# Patient Record
Sex: FEMALE | Race: WHITE | NOT HISPANIC OR LATINO | Employment: OTHER
[De-identification: names, ages, dates, MRNs, and addresses within clinical notes are randomized per-mention and may not be internally consistent; named-entity substitution may affect disease eponyms.]

---

## 2017-01-03 ENCOUNTER — HOSPITAL (OUTPATIENT)
Dept: OTHER | Age: 82
End: 2017-01-03
Attending: INTERNAL MEDICINE

## 2017-02-12 ENCOUNTER — HOSPITAL ENCOUNTER (EMERGENCY)
Facility: HOSPITAL | Age: 82
Discharge: HOME | End: 2017-02-12
Attending: EMERGENCY MEDICINE
Payer: MEDICARE

## 2017-02-12 ENCOUNTER — APPOINTMENT (OUTPATIENT)
Dept: GENERAL RADIOLOGY | Facility: HOSPITAL | Age: 82
End: 2017-02-12
Attending: EMERGENCY MEDICINE
Payer: MEDICARE

## 2017-02-12 ENCOUNTER — APPOINTMENT (OUTPATIENT)
Dept: CT IMAGING | Facility: HOSPITAL | Age: 82
End: 2017-02-12
Attending: EMERGENCY MEDICINE
Payer: MEDICARE

## 2017-02-12 VITALS
DIASTOLIC BLOOD PRESSURE: 72 MMHG | WEIGHT: 145 LBS | HEART RATE: 80 BPM | TEMPERATURE: 98 F | BODY MASS INDEX: 24.16 KG/M2 | HEIGHT: 65 IN | SYSTOLIC BLOOD PRESSURE: 152 MMHG | OXYGEN SATURATION: 94 % | RESPIRATION RATE: 14 BRPM

## 2017-02-12 DIAGNOSIS — S09.90XA HEAD INJURY, INITIAL ENCOUNTER: ICD-10-CM

## 2017-02-12 DIAGNOSIS — S80.01XA CONTUSION OF RIGHT KNEE, INITIAL ENCOUNTER: ICD-10-CM

## 2017-02-12 DIAGNOSIS — S00.83XA FACIAL CONTUSION, INITIAL ENCOUNTER: ICD-10-CM

## 2017-02-12 DIAGNOSIS — S20.219A CHEST WALL CONTUSION, UNSPECIFIED LATERALITY, INITIAL ENCOUNTER: Primary | ICD-10-CM

## 2017-02-12 PROCEDURE — 73560 X-RAY EXAM OF KNEE 1 OR 2: CPT

## 2017-02-12 PROCEDURE — 71120 X-RAY EXAM BREASTBONE 2/>VWS: CPT

## 2017-02-12 PROCEDURE — 90471 IMMUNIZATION ADMIN: CPT

## 2017-02-12 PROCEDURE — 99284 EMERGENCY DEPT VISIT MOD MDM: CPT

## 2017-02-12 PROCEDURE — 71111 X-RAY EXAM RIBS/CHEST4/> VWS: CPT

## 2017-02-12 PROCEDURE — 70450 CT HEAD/BRAIN W/O DYE: CPT

## 2017-02-12 RX ORDER — HYDROCODONE BITARTRATE AND ACETAMINOPHEN 5; 325 MG/1; MG/1
1-2 TABLET ORAL EVERY 4 HOURS PRN
Qty: 30 TABLET | Refills: 0 | Status: SHIPPED | OUTPATIENT
Start: 2017-02-12 | End: 2017-02-19

## 2017-02-12 RX ORDER — HYDROCODONE BITARTRATE AND ACETAMINOPHEN 5; 325 MG/1; MG/1
2 TABLET ORAL ONCE
Status: COMPLETED | OUTPATIENT
Start: 2017-02-12 | End: 2017-02-12

## 2017-02-12 NOTE — ED PROVIDER NOTES
Patient Seen in: [facility] Emergency Department    History   Patient presents with:  Contusion (musculoskeletal)    Stated Complaint: fall rib pain    HPI    80year-old female stated that she lost her footing and fell in a large store 3 days ago h times daily.    Spacer/Aero-Holding Chambers (AEROCHAMBER MINI CHAMBER) Does not apply Device,  Use w proair       Family History   Problem Relation Age of Onset   • Other[other] [OTHER] Father      MS   • Heart Disorder Brother 78     Myocardial infarction ecchymosis or crepitations. Abdomen is soft and nontender without masses or rebound. Back is atraumatic. Extremities: Left upper and lower extremities are atraumatic.   Right upper extremity–there is a 1 cm C-shaped superficial laceration on the dorsal a

## 2017-06-05 ENCOUNTER — APPOINTMENT (OUTPATIENT)
Dept: GENERAL RADIOLOGY | Facility: HOSPITAL | Age: 82
End: 2017-06-05
Attending: EMERGENCY MEDICINE
Payer: MEDICARE

## 2017-06-05 ENCOUNTER — HOSPITAL ENCOUNTER (OUTPATIENT)
Facility: HOSPITAL | Age: 82
Setting detail: OBSERVATION
LOS: 1 days | Discharge: HOME | End: 2017-06-06
Attending: EMERGENCY MEDICINE | Admitting: INTERNAL MEDICINE
Payer: MEDICARE

## 2017-06-05 DIAGNOSIS — S32.592A FRACTURE OF MULTIPLE PUBIC RAMI, LEFT, CLOSED, INITIAL ENCOUNTER (HCC): Primary | ICD-10-CM

## 2017-06-05 PROBLEM — S32.599A FRACTURE OF MULTIPLE PUBIC RAMI (HCC): Status: ACTIVE | Noted: 2017-06-05

## 2017-06-05 PROCEDURE — 96374 THER/PROPH/DIAG INJ IV PUSH: CPT

## 2017-06-05 PROCEDURE — 73502 X-RAY EXAM HIP UNI 2-3 VIEWS: CPT | Performed by: EMERGENCY MEDICINE

## 2017-06-05 PROCEDURE — 96372 THER/PROPH/DIAG INJ SC/IM: CPT

## 2017-06-05 PROCEDURE — 96375 TX/PRO/DX INJ NEW DRUG ADDON: CPT

## 2017-06-05 PROCEDURE — 99285 EMERGENCY DEPT VISIT HI MDM: CPT

## 2017-06-05 RX ORDER — LOSARTAN POTASSIUM 50 MG/1
50 TABLET ORAL DAILY
COMMUNITY
End: 2019-09-17 | Stop reason: ALTCHOICE

## 2017-06-05 RX ORDER — METOPROLOL SUCCINATE 25 MG/1
25 TABLET, EXTENDED RELEASE ORAL
Status: DISCONTINUED | OUTPATIENT
Start: 2017-06-06 | End: 2017-06-06

## 2017-06-05 RX ORDER — ENOXAPARIN SODIUM 100 MG/ML
40 INJECTION SUBCUTANEOUS DAILY
Status: DISCONTINUED | OUTPATIENT
Start: 2017-06-05 | End: 2017-06-06

## 2017-06-05 RX ORDER — AMLODIPINE BESYLATE 5 MG/1
5 TABLET ORAL DAILY
Status: DISCONTINUED | OUTPATIENT
Start: 2017-06-05 | End: 2017-06-06

## 2017-06-05 RX ORDER — POLYETHYLENE GLYCOL 3350 17 G/17G
17 POWDER, FOR SOLUTION ORAL DAILY PRN
Status: DISCONTINUED | OUTPATIENT
Start: 2017-06-05 | End: 2017-06-06

## 2017-06-05 RX ORDER — IBUPROFEN 400 MG/1
200 TABLET ORAL EVERY 4 HOURS PRN
Status: DISCONTINUED | OUTPATIENT
Start: 2017-06-05 | End: 2017-06-06

## 2017-06-05 RX ORDER — PRAVASTATIN SODIUM 10 MG
10 TABLET ORAL NIGHTLY
Status: DISCONTINUED | OUTPATIENT
Start: 2017-06-05 | End: 2017-06-06

## 2017-06-05 RX ORDER — ACETAMINOPHEN 325 MG/1
650 TABLET ORAL EVERY 6 HOURS PRN
Status: DISCONTINUED | OUTPATIENT
Start: 2017-06-05 | End: 2017-06-06

## 2017-06-05 RX ORDER — IBUPROFEN 600 MG/1
600 TABLET ORAL EVERY 4 HOURS PRN
Status: DISCONTINUED | OUTPATIENT
Start: 2017-06-05 | End: 2017-06-06

## 2017-06-05 RX ORDER — DIPHENHYDRAMINE HYDROCHLORIDE 50 MG/ML
25 INJECTION INTRAMUSCULAR; INTRAVENOUS ONCE
Status: COMPLETED | OUTPATIENT
Start: 2017-06-05 | End: 2017-06-05

## 2017-06-05 RX ORDER — LOSARTAN POTASSIUM 50 MG/1
50 TABLET ORAL 2 TIMES DAILY
Status: DISCONTINUED | OUTPATIENT
Start: 2017-06-05 | End: 2017-06-06

## 2017-06-05 RX ORDER — PANTOPRAZOLE SODIUM 40 MG/1
40 TABLET, DELAYED RELEASE ORAL
Status: DISCONTINUED | OUTPATIENT
Start: 2017-06-06 | End: 2017-06-06

## 2017-06-05 RX ORDER — PRAVASTATIN SODIUM 10 MG
10 TABLET ORAL NIGHTLY
COMMUNITY
End: 2017-07-31

## 2017-06-05 RX ORDER — SODIUM PHOSPHATE, DIBASIC AND SODIUM PHOSPHATE, MONOBASIC 7; 19 G/133ML; G/133ML
1 ENEMA RECTAL ONCE AS NEEDED
Status: DISCONTINUED | OUTPATIENT
Start: 2017-06-05 | End: 2017-06-06

## 2017-06-05 RX ORDER — DOCUSATE SODIUM 100 MG/1
100 CAPSULE, LIQUID FILLED ORAL 2 TIMES DAILY
Status: DISCONTINUED | OUTPATIENT
Start: 2017-06-05 | End: 2017-06-06

## 2017-06-05 RX ORDER — BISACODYL 10 MG
10 SUPPOSITORY, RECTAL RECTAL
Status: DISCONTINUED | OUTPATIENT
Start: 2017-06-05 | End: 2017-06-06

## 2017-06-05 RX ORDER — AMLODIPINE BESYLATE 5 MG/1
5 TABLET ORAL DAILY
COMMUNITY
End: 2018-09-17 | Stop reason: ALTCHOICE

## 2017-06-05 RX ORDER — MORPHINE SULFATE 4 MG/ML
4 INJECTION, SOLUTION INTRAMUSCULAR; INTRAVENOUS EVERY 30 MIN PRN
Status: DISCONTINUED | OUTPATIENT
Start: 2017-06-05 | End: 2017-06-06

## 2017-06-05 RX ORDER — ONDANSETRON 2 MG/ML
4 INJECTION INTRAMUSCULAR; INTRAVENOUS ONCE
Status: COMPLETED | OUTPATIENT
Start: 2017-06-05 | End: 2017-06-05

## 2017-06-05 RX ORDER — TRAMADOL HYDROCHLORIDE 50 MG/1
50 TABLET ORAL EVERY 6 HOURS PRN
Status: DISCONTINUED | OUTPATIENT
Start: 2017-06-05 | End: 2017-06-06

## 2017-06-05 RX ORDER — IBUPROFEN 400 MG/1
400 TABLET ORAL EVERY 4 HOURS PRN
Status: DISCONTINUED | OUTPATIENT
Start: 2017-06-05 | End: 2017-06-06

## 2017-06-05 NOTE — ED PROVIDER NOTES
Patient Seen in: [facility] Emergency Department    History   Patient presents with:  Hip Pain    Stated Complaint: fall; L hip pain    HPI    Alejandro Sheehan is an 24-year-old female presenting to the emergency department for hip pain.   Patient states she 0.50  Years: 20      Smokeless Status: Never Used                        Alcohol Use: Yes           0.6 oz/week       1 Glasses of wine per week       Comment: rare      Review of Systems    Positive for stated complaint: fall; L hip pain  Other systems ar

## 2017-06-05 NOTE — H&P
[facility] Hospitalist History and Physical      Patient presents with:  Hip Pain       PCP: Nora Uribe MD      History of Present Illness: Patient is a 80year old female with PMH sig for afib, HTN presents sp fall with sig L groin/hip pain.       She was adenopathy, thyroid: no enlargment/tenderness/nodules appreciated   Lungs:   Clear to auscultation bilaterally. Normal effort   Chest wall:  No tenderness or deformity.    Heart:  Regular rate and rhythm, S1, S2 normal, no murmur, rub or gallop appreciated to span two midnight's based on the clinical documentation in H+P. Based on patients current state of illness, I anticipate that, after discharge, patient will require TBD.

## 2017-06-05 NOTE — CONSULTS
[facility]  Report of Consultation    Adán Almanzanithya Patient Status:  Inpatient    1930 MRN [id number]   [facility] 3SW-A Attending Sherry Jo MD   [facility] Day # 0 PCP Starla Bravo MD     Reason for Consultation: MG/0.4ML injection 40 mg, 40 mg, Subcutaneous, Daily  •  acetaminophen (TYLENOL) tab 650 mg, 650 mg, Oral, Q6H PRN  •  ibuprofen (MOTRIN) tab 200 mg, 200 mg, Oral, Q4H PRN **OR** ibuprofen (MOTRIN) tab 400 mg, 400 mg, Oral, Q4H PRN **OR** ibuprofen (MOTRIN Study Result      PROCEDURE:  XR HIP W OR WO PELVIS 2 OR 3 VIEWS, LEFT (CPT=73502)      TECHNIQUE:  Unilateral 2 to 3 views of the hip and pelvis if performed.      COMPARISON:  None.      INDICATIONS:  fall; L hip pain      PATIENT STATED HISTORY: (A

## 2017-06-05 NOTE — PROGRESS NOTES
Patient received from ER. Alert and oriented. Very hard of hearing, and daughter is taking her hearing aides home, as she does NOT want them here in hospital in fear of losing them. Patient denies need for pain meds at present.  Patient repositioned for co

## 2017-06-06 VITALS
OXYGEN SATURATION: 90 % | DIASTOLIC BLOOD PRESSURE: 49 MMHG | HEART RATE: 77 BPM | TEMPERATURE: 98 F | RESPIRATION RATE: 18 BRPM | BODY MASS INDEX: 24.11 KG/M2 | SYSTOLIC BLOOD PRESSURE: 117 MMHG | HEIGHT: 66 IN | WEIGHT: 150 LBS

## 2017-06-06 PROCEDURE — 85025 COMPLETE CBC W/AUTO DIFF WBC: CPT | Performed by: INTERNAL MEDICINE

## 2017-06-06 PROCEDURE — 80048 BASIC METABOLIC PNL TOTAL CA: CPT | Performed by: INTERNAL MEDICINE

## 2017-06-06 PROCEDURE — 97116 GAIT TRAINING THERAPY: CPT

## 2017-06-06 PROCEDURE — 97161 PT EVAL LOW COMPLEX 20 MIN: CPT

## 2017-06-06 RX ORDER — IBUPROFEN 400 MG/1
400 TABLET ORAL EVERY 4 HOURS PRN
Qty: 30 TABLET | Refills: 0 | Status: SHIPPED | OUTPATIENT
Start: 2017-06-06 | End: 2018-05-07

## 2017-06-06 NOTE — CM/SW NOTE
[facility] accepted transport for 5PM to [facility] [phone]. Pt updated re: d/c today and agrees with plan. She feels that no one wants her. SW offered support and discussed that she does need to stay in the hospital but does need rehab.

## 2017-06-06 NOTE — PLAN OF CARE
Impaired Activities of Daily Living    • Achieve highest/safest level of independence in self care Adequate for Discharge        Impaired Functional Mobility    • Achieve highest/safest level of mobility/gait Adequate for Discharge        Impaired Function

## 2017-06-06 NOTE — PROGRESS NOTES
[facility] Hospitalist Progress Note                                                                   [address] Cortes  4/27/1930    SUBJECTIVE:  Up with PT. Got to chair. Taking advil. fracture   Ortho has seen-non operable. WBAT. Pain controlled w advil.       HTN  - cont losartan/metoprolol/statin        Viviana Lopez  [facility] Hospitalist  Pager: [phone]

## 2017-06-06 NOTE — CM/SW NOTE
Patient failed inpatient criteria. Second level of review completed and supports observation. UR committee in agreement. Discussed with Dr. Vipul Hernandez who approves observation status. DENG/Observation letter given to the patient and order written.

## 2017-06-06 NOTE — CM/SW NOTE
06/06/17 1400   CM/SW Referral Data   Referral Source Nurse;Family   Reason for Referral Discharge planning   Informant Children  (Dtrs)   Pertinent Medical Hx   Primary Care Physician Name Allen Ritchie MD   Patient Info   Patient's Mental Status Alert;O [phone]. Informed by Unknown Champagne that cost would be 397/semi-private daily and 309/day for private suite. PT/OT can be billed to Medicare Part B. Informed dtr, Jimmy Noguera, of above. SW also noted that medically, pt can d/c to facility today.   Dtr indicating

## 2017-06-06 NOTE — PHYSICAL THERAPY NOTE
PHYSICAL THERAPY EVALUATION - INPATIENT     Room Number: 360/360-A  Evaluation Date: 6/6/2017  Type of Evaluation: Initial  Physician Order: PT Eval and Treat    Presenting Problem: L pubic rami fx s/p fall  Reason for Therapy: Mobility Dysfunction and home.    OBJECTIVE  Precautions: Hard of hearing  Fall Risk: High fall risk    WEIGHT BEARING RESTRICTION  Weight Bearing Restriction: L lower extremity           L Lower Extremity: Weight Bearing as Tolerated    PAIN ASSESSMENT  Ratin  Location: L guerline CL    FUNCTIONAL ABILITY STATUS  Gait Assessment   Gait Assistance: Dependent assistance (actual min assist)  Distance (ft): 1  Assistive Device: Rolling walker  Pattern: L Decreased stance time  Stoop/Curb Assistance: Not tested       Skilled Therapy Prov that patients with this level of impairment may benefit from continued physical therapy in subacute setting. The patient scores 1/20 on the Elderly Mobility Scale, indicating patient is dependent in terms of safe mobility.   Pt currently requires 2 person a

## 2017-06-07 NOTE — CM/SW NOTE
06/07/17 0700   Discharge disposition   Discharged to: Skilled Nurs   Name of [id number] [address]   Discharge transportation [facility] 6/6/17

## 2017-07-12 PROCEDURE — 83970 ASSAY OF PARATHORMONE: CPT | Performed by: PHYSICIAN ASSISTANT

## 2017-08-03 ENCOUNTER — APPOINTMENT (OUTPATIENT)
Dept: CT IMAGING | Facility: HOSPITAL | Age: 82
End: 2017-08-03
Attending: EMERGENCY MEDICINE
Payer: MEDICARE

## 2017-08-03 ENCOUNTER — APPOINTMENT (OUTPATIENT)
Dept: GENERAL RADIOLOGY | Facility: HOSPITAL | Age: 82
End: 2017-08-03
Attending: EMERGENCY MEDICINE
Payer: MEDICARE

## 2017-08-03 ENCOUNTER — HOSPITAL ENCOUNTER (EMERGENCY)
Facility: HOSPITAL | Age: 82
Discharge: HOME | End: 2017-08-03
Attending: EMERGENCY MEDICINE
Payer: MEDICARE

## 2017-08-03 VITALS
OXYGEN SATURATION: 98 % | SYSTOLIC BLOOD PRESSURE: 122 MMHG | RESPIRATION RATE: 18 BRPM | WEIGHT: 151.88 LBS | TEMPERATURE: 97 F | HEIGHT: 63.5 IN | DIASTOLIC BLOOD PRESSURE: 53 MMHG | BODY MASS INDEX: 26.58 KG/M2 | HEART RATE: 78 BPM

## 2017-08-03 DIAGNOSIS — R19.7 DIARRHEA, UNSPECIFIED TYPE: Primary | ICD-10-CM

## 2017-08-03 DIAGNOSIS — K57.92 ACUTE DIVERTICULITIS: ICD-10-CM

## 2017-08-03 LAB
ALBUMIN SERPL-MCNC: 3.4 G/DL (ref 3.5–4.8)
ALP LIVER SERPL-CCNC: 153 U/L (ref 55–142)
ALT SERPL-CCNC: 15 U/L (ref 14–54)
AST SERPL-CCNC: 18 U/L (ref 15–41)
BASOPHILS # BLD AUTO: 0.11 X10(3) UL (ref 0–0.1)
BASOPHILS NFR BLD AUTO: 1 %
BILIRUB SERPL-MCNC: 0.5 MG/DL (ref 0.1–2)
BILIRUB UR QL STRIP.AUTO: NEGATIVE
BUN BLD-MCNC: 13 MG/DL (ref 8–20)
CALCIUM BLD-MCNC: 8.8 MG/DL (ref 8.3–10.3)
CHLORIDE: 107 MMOL/L (ref 101–111)
CLARITY UR REFRACT.AUTO: CLEAR
CO2: 28 MMOL/L (ref 22–32)
COLOR UR AUTO: YELLOW
CREAT BLD-MCNC: 0.78 MG/DL (ref 0.55–1.02)
EOSINOPHIL # BLD AUTO: 0.29 X10(3) UL (ref 0–0.3)
EOSINOPHIL NFR BLD AUTO: 2.7 %
ERYTHROCYTE [DISTWIDTH] IN BLOOD BY AUTOMATED COUNT: 15.3 % (ref 11.5–16)
GLUCOSE BLD-MCNC: 91 MG/DL (ref 70–99)
GLUCOSE UR STRIP.AUTO-MCNC: NEGATIVE MG/DL
HCT VFR BLD AUTO: 39.4 % (ref 34–50)
HGB BLD-MCNC: 12.5 G/DL (ref 12–16)
IMMATURE GRANULOCYTE COUNT: 0.04 X10(3) UL (ref 0–1)
IMMATURE GRANULOCYTE RATIO %: 0.4 %
KETONES UR STRIP.AUTO-MCNC: NEGATIVE MG/DL
LYMPHOCYTES # BLD AUTO: 1.93 X10(3) UL (ref 0.9–4)
LYMPHOCYTES NFR BLD AUTO: 18.3 %
M PROTEIN MFR SERPL ELPH: 7.8 G/DL (ref 6.1–8.3)
MCH RBC QN AUTO: 26.2 PG (ref 27–33.2)
MCHC RBC AUTO-ENTMCNC: 31.7 G/DL (ref 31–37)
MCV RBC AUTO: 82.6 FL (ref 81–100)
MONOCYTES # BLD AUTO: 0.57 X10(3) UL (ref 0.1–0.6)
MONOCYTES NFR BLD AUTO: 5.4 %
NEUTROPHIL ABS PRELIM: 7.61 X10 (3) UL (ref 1.3–6.7)
NEUTROPHILS # BLD AUTO: 7.61 X10(3) UL (ref 1.3–6.7)
NEUTROPHILS NFR BLD AUTO: 72.2 %
NITRITE UR QL STRIP.AUTO: POSITIVE
PH UR STRIP.AUTO: 5 [PH] (ref 4.5–8)
PLATELET # BLD AUTO: 397 10(3)UL (ref 150–450)
POTASSIUM SERPL-SCNC: 3 MMOL/L (ref 3.6–5.1)
PROT UR STRIP.AUTO-MCNC: NEGATIVE MG/DL
RBC # BLD AUTO: 4.77 X10(6)UL (ref 3.8–5.1)
RED CELL DISTRIBUTION WIDTH-SD: 46.3 FL (ref 35.1–46.3)
ROTAVIRUS AG EIA: NEGATIVE
SODIUM SERPL-SCNC: 142 MMOL/L (ref 136–144)
SP GR UR STRIP.AUTO: 1.03 (ref 1–1.03)
UROBILINOGEN UR STRIP.AUTO-MCNC: <2 MG/DL
WBC # BLD AUTO: 10.6 X10(3) UL (ref 4–13)

## 2017-08-03 PROCEDURE — 87425 ROTAVIRUS AG IA: CPT | Performed by: EMERGENCY MEDICINE

## 2017-08-03 PROCEDURE — 85025 COMPLETE CBC W/AUTO DIFF WBC: CPT | Performed by: EMERGENCY MEDICINE

## 2017-08-03 PROCEDURE — 99284 EMERGENCY DEPT VISIT MOD MDM: CPT

## 2017-08-03 PROCEDURE — 87086 URINE CULTURE/COLONY COUNT: CPT | Performed by: EMERGENCY MEDICINE

## 2017-08-03 PROCEDURE — 87427 SHIGA-LIKE TOXIN AG IA: CPT | Performed by: EMERGENCY MEDICINE

## 2017-08-03 PROCEDURE — 82272 OCCULT BLD FECES 1-3 TESTS: CPT | Performed by: EMERGENCY MEDICINE

## 2017-08-03 PROCEDURE — 80053 COMPREHEN METABOLIC PANEL: CPT | Performed by: EMERGENCY MEDICINE

## 2017-08-03 PROCEDURE — 87186 SC STD MICRODIL/AGAR DIL: CPT | Performed by: EMERGENCY MEDICINE

## 2017-08-03 PROCEDURE — 71010 XR CHEST AP PORTABLE  (CPT=71010): CPT | Performed by: EMERGENCY MEDICINE

## 2017-08-03 PROCEDURE — 87045 FECES CULTURE AEROBIC BACT: CPT | Performed by: EMERGENCY MEDICINE

## 2017-08-03 PROCEDURE — 81001 URINALYSIS AUTO W/SCOPE: CPT | Performed by: EMERGENCY MEDICINE

## 2017-08-03 PROCEDURE — 96374 THER/PROPH/DIAG INJ IV PUSH: CPT

## 2017-08-03 PROCEDURE — 87077 CULTURE AEROBIC IDENTIFY: CPT | Performed by: EMERGENCY MEDICINE

## 2017-08-03 PROCEDURE — 96361 HYDRATE IV INFUSION ADD-ON: CPT

## 2017-08-03 PROCEDURE — 87046 STOOL CULTR AEROBIC BACT EA: CPT | Performed by: EMERGENCY MEDICINE

## 2017-08-03 PROCEDURE — 87493 C DIFF AMPLIFIED PROBE: CPT | Performed by: EMERGENCY MEDICINE

## 2017-08-03 PROCEDURE — 74177 CT ABD & PELVIS W/CONTRAST: CPT | Performed by: EMERGENCY MEDICINE

## 2017-08-03 RX ORDER — ONDANSETRON 2 MG/ML
4 INJECTION INTRAMUSCULAR; INTRAVENOUS ONCE
Status: COMPLETED | OUTPATIENT
Start: 2017-08-03 | End: 2017-08-03

## 2017-08-03 RX ORDER — DIPHENOXYLATE HYDROCHLORIDE AND ATROPINE SULFATE 2.5; .025 MG/1; MG/1
1-2 TABLET ORAL 4 TIMES DAILY PRN
Qty: 30 TABLET | Refills: 0 | Status: SHIPPED | OUTPATIENT
Start: 2017-08-03 | End: 2017-09-02

## 2017-08-03 RX ORDER — METRONIDAZOLE 500 MG/1
500 TABLET ORAL 3 TIMES DAILY
Qty: 30 TABLET | Refills: 0 | Status: SHIPPED | OUTPATIENT
Start: 2017-08-03 | End: 2017-08-13

## 2017-08-03 RX ORDER — CLINDAMYCIN HYDROCHLORIDE 300 MG/1
300 CAPSULE ORAL 3 TIMES DAILY
Qty: 30 CAPSULE | Refills: 0 | Status: SHIPPED | OUTPATIENT
Start: 2017-08-03 | End: 2017-08-13

## 2017-08-03 NOTE — ED INITIAL ASSESSMENT (HPI)
Dizziness, diarrhea, weakness x5 days. Diarrhea 3-4x per day. Afebrile. Hospitalized in June with broken pelvis after a fall.

## 2017-08-03 NOTE — ED PROVIDER NOTES
Patient Seen in: [facility] Emergency Department    History   Patient presents with:  Nausea/Vomiting/Diarrhea (gastrointestinal)    Stated Complaint: pt c/o diahrrea for 5 days     HPI    70-year-old female presents emergency department complaining by mouth every 4 (four) hours as needed. AmLODIPine Besylate 5 MG Oral Tab,  Take 5 mg by mouth daily. Losartan Potassium 50 MG Oral Tab,  Take 50 mg by mouth 2 (two) times daily.    Metoprolol Succinate ER 25 MG Oral Tablet 24 Hr,  Take 25 mg by mouth meningismus no carotid bruit  CV: Regular rate and rhythm no murmur rub  Respiratory: Clear to auscultation bilaterally no crackles no wheezes no accessory muscle use  Abdomen: Diffuse abdominal tenderness, no rebound no guarding  no hepatosplenomegaly bow CULTURE W/SHIGATOXIN    Narrative: The following orders were created for panel order STOOL CULTURE W/SHIGATOXIN.   Procedure                               Abnormality         Status                     ---------                               ----------- pt c/o diarrhea for 5 days  TECHNIQUE:  CT scanning was performed from the dome of the diaphragm to the pubic symphysis with non-ionic intravenous contrast material. Post contrast coronal MPR imaging was performed. Dose reduction techniques were used.  Yassine Pierce ORGANS:  Status post hysterectomy. BONES:  Old bilateral rib fractures noted. Healing fractures of the left inferior and superior pubic rami with some callus and periosteal reaction. Status post right total hip pinning are typical artifacts.  Moderate scoli Patient was also instructed to followup with the appropriate physician. Patient verbalizes and agrees with plan. Patient discharged in good condition.         Disposition and Plan     Clinical Impression:  Diarrhea, unspecified type  (primary encounter di

## 2017-08-09 PROBLEM — G89.29 CHRONIC BILATERAL LOW BACK PAIN: Status: ACTIVE | Noted: 2017-08-09

## 2017-08-09 PROBLEM — M54.50 CHRONIC BILATERAL LOW BACK PAIN: Status: ACTIVE | Noted: 2017-08-09

## 2017-09-01 ENCOUNTER — HOSPITAL (OUTPATIENT)
Dept: OTHER | Age: 82
End: 2017-09-01
Attending: INTERNAL MEDICINE

## 2018-03-29 ENCOUNTER — APPOINTMENT (OUTPATIENT)
Dept: GENERAL RADIOLOGY | Facility: HOSPITAL | Age: 83
DRG: 192 | End: 2018-03-29
Attending: EMERGENCY MEDICINE
Payer: MEDICARE

## 2018-03-29 ENCOUNTER — HOSPITAL ENCOUNTER (INPATIENT)
Facility: HOSPITAL | Age: 83
LOS: 1 days | Discharge: HOME | DRG: 192 | End: 2018-03-30
Attending: EMERGENCY MEDICINE | Admitting: INTERNAL MEDICINE
Payer: MEDICARE

## 2018-03-29 ENCOUNTER — APPOINTMENT (OUTPATIENT)
Dept: CT IMAGING | Facility: HOSPITAL | Age: 83
DRG: 192 | End: 2018-03-29
Attending: EMERGENCY MEDICINE
Payer: MEDICARE

## 2018-03-29 DIAGNOSIS — E87.6 HYPOKALEMIA: Primary | ICD-10-CM

## 2018-03-29 DIAGNOSIS — R09.02 HYPOXIA: ICD-10-CM

## 2018-03-29 DIAGNOSIS — J20.9 ACUTE BRONCHITIS, UNSPECIFIED ORGANISM: ICD-10-CM

## 2018-03-29 DIAGNOSIS — R07.9 ACUTE CHEST PAIN: ICD-10-CM

## 2018-03-29 PROBLEM — R73.9 HYPERGLYCEMIA: Status: ACTIVE | Noted: 2018-03-29

## 2018-03-29 PROBLEM — D72.829 LEUKOCYTOSIS: Status: ACTIVE | Noted: 2018-03-29

## 2018-03-29 PROCEDURE — 99285 EMERGENCY DEPT VISIT HI MDM: CPT

## 2018-03-29 PROCEDURE — 96366 THER/PROPH/DIAG IV INF ADDON: CPT

## 2018-03-29 PROCEDURE — 93010 ELECTROCARDIOGRAM REPORT: CPT

## 2018-03-29 PROCEDURE — 36415 COLL VENOUS BLD VENIPUNCTURE: CPT

## 2018-03-29 PROCEDURE — 87999 UNLISTED MICROBIOLOGY PX: CPT

## 2018-03-29 PROCEDURE — 84484 ASSAY OF TROPONIN QUANT: CPT

## 2018-03-29 PROCEDURE — 96365 THER/PROPH/DIAG IV INF INIT: CPT

## 2018-03-29 PROCEDURE — 87502 INFLUENZA DNA AMP PROBE: CPT | Performed by: EMERGENCY MEDICINE

## 2018-03-29 PROCEDURE — 85025 COMPLETE CBC W/AUTO DIFF WBC: CPT | Performed by: EMERGENCY MEDICINE

## 2018-03-29 PROCEDURE — 93005 ELECTROCARDIOGRAM TRACING: CPT

## 2018-03-29 PROCEDURE — 87040 BLOOD CULTURE FOR BACTERIA: CPT | Performed by: EMERGENCY MEDICINE

## 2018-03-29 PROCEDURE — 80053 COMPREHEN METABOLIC PANEL: CPT | Performed by: EMERGENCY MEDICINE

## 2018-03-29 PROCEDURE — 71275 CT ANGIOGRAPHY CHEST: CPT | Performed by: EMERGENCY MEDICINE

## 2018-03-29 PROCEDURE — 87798 DETECT AGENT NOS DNA AMP: CPT | Performed by: EMERGENCY MEDICINE

## 2018-03-29 PROCEDURE — 94640 AIRWAY INHALATION TREATMENT: CPT

## 2018-03-29 PROCEDURE — 71046 X-RAY EXAM CHEST 2 VIEWS: CPT | Performed by: EMERGENCY MEDICINE

## 2018-03-29 PROCEDURE — 85378 FIBRIN DEGRADE SEMIQUANT: CPT | Performed by: EMERGENCY MEDICINE

## 2018-03-29 PROCEDURE — 84484 ASSAY OF TROPONIN QUANT: CPT | Performed by: EMERGENCY MEDICINE

## 2018-03-29 PROCEDURE — 83880 ASSAY OF NATRIURETIC PEPTIDE: CPT | Performed by: EMERGENCY MEDICINE

## 2018-03-29 RX ORDER — ONDANSETRON 2 MG/ML
4 INJECTION INTRAMUSCULAR; INTRAVENOUS EVERY 6 HOURS PRN
Status: DISCONTINUED | OUTPATIENT
Start: 2018-03-29 | End: 2018-03-29

## 2018-03-29 RX ORDER — LOSARTAN POTASSIUM 50 MG/1
50 TABLET ORAL 2 TIMES DAILY
Status: DISCONTINUED | OUTPATIENT
Start: 2018-03-29 | End: 2018-03-30

## 2018-03-29 RX ORDER — HYDROCODONE BITARTRATE AND ACETAMINOPHEN 5; 325 MG/1; MG/1
2 TABLET ORAL EVERY 4 HOURS PRN
Status: DISCONTINUED | OUTPATIENT
Start: 2018-03-29 | End: 2018-03-30

## 2018-03-29 RX ORDER — AMLODIPINE BESYLATE 5 MG/1
5 TABLET ORAL DAILY
Status: DISCONTINUED | OUTPATIENT
Start: 2018-03-29 | End: 2018-03-30

## 2018-03-29 RX ORDER — ALBUTEROL SULFATE 2.5 MG/3ML
2.5 SOLUTION RESPIRATORY (INHALATION) EVERY 6 HOURS PRN
Status: DISCONTINUED | OUTPATIENT
Start: 2018-03-29 | End: 2018-03-30

## 2018-03-29 RX ORDER — PANTOPRAZOLE SODIUM 40 MG/1
40 TABLET, DELAYED RELEASE ORAL
Status: DISCONTINUED | OUTPATIENT
Start: 2018-03-30 | End: 2018-03-30

## 2018-03-29 RX ORDER — ACETAMINOPHEN 325 MG/1
650 TABLET ORAL EVERY 4 HOURS PRN
Status: DISCONTINUED | OUTPATIENT
Start: 2018-03-29 | End: 2018-03-30

## 2018-03-29 RX ORDER — PRAVASTATIN SODIUM 10 MG
10 TABLET ORAL NIGHTLY
Status: DISCONTINUED | OUTPATIENT
Start: 2018-03-29 | End: 2018-03-30

## 2018-03-29 RX ORDER — IBUPROFEN 600 MG/1
600 TABLET ORAL EVERY 6 HOURS PRN
Status: DISCONTINUED | OUTPATIENT
Start: 2018-03-29 | End: 2018-03-30

## 2018-03-29 RX ORDER — HYDROCODONE BITARTRATE AND ACETAMINOPHEN 5; 325 MG/1; MG/1
1 TABLET ORAL EVERY 4 HOURS PRN
Status: DISCONTINUED | OUTPATIENT
Start: 2018-03-29 | End: 2018-03-30

## 2018-03-29 RX ORDER — POTASSIUM CHLORIDE 14.9 MG/ML
20 INJECTION INTRAVENOUS ONCE
Status: COMPLETED | OUTPATIENT
Start: 2018-03-29 | End: 2018-03-29

## 2018-03-29 RX ORDER — METOPROLOL SUCCINATE 25 MG/1
25 TABLET, EXTENDED RELEASE ORAL
Status: DISCONTINUED | OUTPATIENT
Start: 2018-03-29 | End: 2018-03-30

## 2018-03-29 RX ORDER — IBUPROFEN 600 MG/1
600 TABLET ORAL ONCE
Status: COMPLETED | OUTPATIENT
Start: 2018-03-29 | End: 2018-03-29

## 2018-03-29 RX ORDER — IPRATROPIUM BROMIDE AND ALBUTEROL SULFATE 2.5; .5 MG/3ML; MG/3ML
3 SOLUTION RESPIRATORY (INHALATION) CONTINUOUS
Status: DISCONTINUED | OUTPATIENT
Start: 2018-03-29 | End: 2018-03-30

## 2018-03-29 RX ORDER — POTASSIUM CHLORIDE 20 MEQ/1
40 TABLET, EXTENDED RELEASE ORAL ONCE
Status: COMPLETED | OUTPATIENT
Start: 2018-03-29 | End: 2018-03-29

## 2018-03-29 NOTE — ED PROVIDER NOTES
Patient Seen in: [facility] Emergency Department    History   Patient presents with:  Dyspnea CANDIS SOB (respiratory)    Stated Complaint: CANDIS    HPI    80-year-old female complaining of shortness of breath.   The patient states she has had some cold s Exam   ED Triage Vitals  BP: 111/56 [03/29/18 0730]  Pulse: 84 [03/29/18 0618]  Resp: 20 [03/29/18 0618]  Temp: (!) 97.5 °F (36.4 °C) [03/29/18 1310]  Temp src: Oral [03/29/18 1310]  SpO2: 90 % [03/29/18 0730]  O2 Device: Nasal cannula [03/29/18 0641]    C Basophil Absolute 0.11 (*)     All other components within normal limits   CBC W/ DIFFERENTIAL - Abnormal; Notable for the following:     WBC 13.2 (*)     HGB 11.2 (*)     MCH 26.0 (*)     Neutrophil Absolute Prelim 10.39 (*)     Neutrophil Absolute 10.39 hemoglobin 11.2 white blood 13.2  Xr Chest Pa + Lat Chest (cpt=71046)    Result Date: 3/29/2018  CONCLUSION:  No lobar consolidation to suggest pneumonia. COPD changes are again seen.      Dictated by: Misti Wall MD on 3/29/2018 at 7:32     Approved (20 mEq total) by mouth daily.   Qty: 30 tablet Refills: 0          Present on Admission  Date Reviewed: 1/26/2015          ICD-10-CM Noted POA    * (Principal)Hypokalemia E87.6 3/29/2018 Unknown    Acute bronchitis, unspecified organism J20.9 3/29/2018

## 2018-03-29 NOTE — CM/SW NOTE
Reviewed notes from paramedics. 89% on room air documented.  Patient normally only uses oxygen at night @ 3 LPM.

## 2018-03-29 NOTE — ED INITIAL ASSESSMENT (HPI)
Pt to er with [facility] for c.c. isabel with cough pt seen at urgent care yesterday for same. Pt given resp tx prior to arrival by medics pt states some relief .

## 2018-03-29 NOTE — ED NOTES
Report given to [facility]. Lunch tray ordered, will be delivered to pt's inpatient room.

## 2018-03-29 NOTE — ED NOTES
Pt ambulated around nursing station with 3L NC, walker and RN standby assistance. Tolerated well, o2 sat 93-95% on 3L while walking.

## 2018-03-29 NOTE — PLAN OF CARE
Patient received from the ED. Patient is a DNR. Patient presented with SOB, from home lives with her daughter. Patient is alert and oriented. On 3L NC, baseline for patient. Patient uses oxygen when not ambulating only per daughter and patient.  Ambulates o

## 2018-03-30 ENCOUNTER — APPOINTMENT (OUTPATIENT)
Dept: CV DIAGNOSTICS | Facility: HOSPITAL | Age: 83
DRG: 192 | End: 2018-03-30
Attending: INTERNAL MEDICINE
Payer: MEDICARE

## 2018-03-30 VITALS
DIASTOLIC BLOOD PRESSURE: 51 MMHG | RESPIRATION RATE: 18 BRPM | SYSTOLIC BLOOD PRESSURE: 133 MMHG | TEMPERATURE: 99 F | HEART RATE: 88 BPM | OXYGEN SATURATION: 91 %

## 2018-03-30 PROCEDURE — 84132 ASSAY OF SERUM POTASSIUM: CPT | Performed by: INTERNAL MEDICINE

## 2018-03-30 PROCEDURE — 94640 AIRWAY INHALATION TREATMENT: CPT

## 2018-03-30 PROCEDURE — 85025 COMPLETE CBC W/AUTO DIFF WBC: CPT | Performed by: INTERNAL MEDICINE

## 2018-03-30 PROCEDURE — 83735 ASSAY OF MAGNESIUM: CPT | Performed by: INTERNAL MEDICINE

## 2018-03-30 PROCEDURE — 80048 BASIC METABOLIC PNL TOTAL CA: CPT | Performed by: INTERNAL MEDICINE

## 2018-03-30 RX ORDER — MAGNESIUM OXIDE 400 MG (241.3 MG MAGNESIUM) TABLET
400 TABLET DAILY
Qty: 30 TABLET | Refills: 0 | Status: SHIPPED | OUTPATIENT
Start: 2018-03-30 | End: 2018-04-25

## 2018-03-30 RX ORDER — ASPIRIN 81 MG/1
81 TABLET ORAL DAILY
Status: DISCONTINUED | OUTPATIENT
Start: 2018-03-30 | End: 2018-03-30

## 2018-03-30 RX ORDER — POTASSIUM CHLORIDE 20 MEQ/1
20 TABLET, EXTENDED RELEASE ORAL DAILY
Qty: 30 TABLET | Refills: 0 | Status: SHIPPED | OUTPATIENT
Start: 2018-03-30 | End: 2018-03-30

## 2018-03-30 RX ORDER — MAGNESIUM OXIDE 400 MG (241.3 MG MAGNESIUM) TABLET
400 TABLET ONCE
Status: DISCONTINUED | OUTPATIENT
Start: 2018-03-30 | End: 2018-03-30

## 2018-03-30 RX ORDER — POTASSIUM CHLORIDE 20 MEQ/1
40 TABLET, EXTENDED RELEASE ORAL EVERY 4 HOURS
Status: COMPLETED | OUTPATIENT
Start: 2018-03-30 | End: 2018-03-30

## 2018-03-30 RX ORDER — ALBUTEROL SULFATE 2.5 MG/3ML
2.5 SOLUTION RESPIRATORY (INHALATION)
Status: DISCONTINUED | OUTPATIENT
Start: 2018-03-30 | End: 2018-03-30

## 2018-03-30 NOTE — PROGRESS NOTES
03/30/18 1351   Clinical Encounter Type   Visited With Patient   Routine Visit (Responded to the consult)   Patient's Supportive Strategies/Resources Identified patient's family support that include her daughters, their families and grandchildren   Tej

## 2018-03-30 NOTE — PLAN OF CARE
Pt. Is alert and oriented times four. Lungs with wheezes throughout. Pt. Is in sinus rhythm on monitor. She is anxious to go home. Both potassium and magnesium being replaced. O2 at 3 liters nasal cannula.

## 2018-03-30 NOTE — CONSULTS
[facility] Cardiology Consultation    Abbey Cortes Patient Status:  Inpatient    1930 MRN [id number]   [facility] 8NE-A Attending Ekta Abbott MD   [address] Day # 1 PCP Maia Lees MD     Reason for Consultation:  Chest pain, S Metoprolol Succinate ER  25 mg Oral Daily Beta Blocker   • Pantoprazole Sodium  40 mg Oral QAM AC   • Pravastatin Sodium  10 mg Oral Nightly       Continuous Infusions:  • ipratropium-albuterol         Social History:   reports that she has quit smoking.  [place] tobacco - acute exacerbation, likely bronchitis. 4. HTN  5. Hypokalemia/hypomagnesemia. - not on diuretics. Plan:  Echo and lexiscan cardiolite. Replace potassium. IV magnesium replacement. Potassium 10 meq on d/c, with BMP in one week.   increase

## 2018-03-30 NOTE — PLAN OF CARE
NURSING DISCHARGE NOTE    Discharged Home via Wheelchair. Accompanied by Support staff  Belongings Taken by patient/family. Pt. Given discharge instructions and follow up. She verbalizes understanding of new meds.

## 2018-03-30 NOTE — PLAN OF CARE
RESPIRATORY - ADULT    • Achieves optimal ventilation and oxygenation Progressing          Hx: falls, Afib, HTN, GERD, RT limb precautions     Patient came in with \"head cold\" symptoms and SOB, cxr- COPD, trops (-), CT (-) PE, blood cultures- pending, fl

## 2018-03-30 NOTE — H&P
[facility] Patient Status:  Inpatient    1930 MRN [id number]   [facility] 8NE-A Attending Kiki Bradford MD   [facility] Day # 1 PCP Jared Acuna MD     History of Present Illness:   Moe Khoury hours as needed for Wheezing.  Disp: 50 ampule Rfl: 0 3/29/2018 at Unknown time   PRAVASTATIN SODIUM 10 MG Oral Tab TAKE 1 TABLET(10 MG) BY MOUTH EVERY NIGHT Disp: 90 tablet Rfl: 1 3/28/2018 at 0900   Alendronate Sodium 70 MG Oral Tab TAKE ONE TABLET WEEKLY tablet 2 tablet Oral BID   ibuprofen (MOTRIN) tab 600 mg 600 mg Oral Q6H PRN   Losartan Potassium (COZAAR) tab 50 mg 50 mg Oral BID   Metoprolol Succinate ER (Toprol XL) 24 hr tab 25 mg 25 mg Oral Daily Beta Blocker   Pantoprazole Sodium (PROTONIX) EC tab nodular pleural thickening involving the right lung apex, anterior lateral aspect of the right upper lobe inferiorly and left lung base as described above. These are new since the previous exam.  Short-term followup imaging is recommended.   4. Cholelithia

## 2018-03-30 NOTE — DISCHARGE SUMMARY
General Medicine Discharge Summary     Patient ID:  Geodeclan Cortes  80year old  4/27/1930    Admit date: 3/29/2018    Discharge date and time: 3/30/18    Attending Physician: Nohemy He MD OP    Stable for d.c      Consults: IP CONSULT TO FAMILY/INTERNAL MED  IP CONSULT TO SPIRITUAL CARE  IP CONSULT TO SOCIAL WORK  IP CONSULT TO CARDIOLOGY    Operative Procedures:        Patient instructions:      Current Discharge Medication List    START t coarse, mild wheeze (unchanged from baseline per pt)  Abdomen: nontender, nondistended, intact BS  Extremities: no pedal edema   Neuro: CN inact, no focal deficits      Total time coordinating care for discharge: Greater than 30 minutes    Robles Valverde MD

## 2018-03-30 NOTE — CONSULTS
[facility]    Report of Consultation    Laykelly Cortes Patient Status:  Inpatient    1930 MRN [id number]   [facility] 8NE-A Attending Reuben Bledsoe MD   [address] Day # 1 PCP Marisol Crowder MD     Date of Admission:  3/29/ Packs/day: 0.50      Years: 20.00     Smokeless tobacco: Never Used                      Alcohol use: Yes           0.6 oz/week     Glasses of wine: 1 per week     Comment: rare           Current Medications:    Current Facility-Ad Take 25 mg by mouth daily. Magnesium Cl-Calcium Carbonate (SLOW-MAG OR) Take 1 tablet by mouth nightly. Calcium Carbonate-Vitamin D 600-400 MG-UNIT Oral Tab Take 1 tablet by mouth 2 (two) times daily.    ibuprofen 600 MG Oral Tab Take 600 mg by mouth HCT 34.9 03/30/2018   .0 03/30/2018   CREATSERUM 0.56 03/30/2018   BUN 9 03/30/2018    03/30/2018   K 3.5 (L) 03/30/2018    03/30/2018   CO2 29.0 03/30/2018    (H) 03/30/2018   CA 8.3 03/30/2018   ALB 3.1 (L) 03/29/2018   ALKPHO -ve troponins x 2. Elevated D dimer with no PE on chest CTA.   Talked on phone with her daughter Massiel Martin as per patient's recommendation and discussed options of conservative management vs stress test / cardiac cath and she without hesitation preferred the fo

## 2018-07-02 PROBLEM — M54.50 CHRONIC BILATERAL LOW BACK PAIN: Status: RESOLVED | Noted: 2017-08-09 | Resolved: 2018-07-02

## 2018-07-02 PROBLEM — R07.9 ACUTE CHEST PAIN: Status: RESOLVED | Noted: 2018-03-29 | Resolved: 2018-07-02

## 2018-07-02 PROBLEM — G89.29 CHRONIC BILATERAL LOW BACK PAIN: Status: RESOLVED | Noted: 2017-08-09 | Resolved: 2018-07-02

## 2018-07-02 PROBLEM — S32.599A FRACTURE OF MULTIPLE PUBIC RAMI (HCC): Status: RESOLVED | Noted: 2017-06-05 | Resolved: 2018-07-02

## 2018-07-02 PROBLEM — R73.9 HYPERGLYCEMIA: Status: RESOLVED | Noted: 2018-03-29 | Resolved: 2018-07-02

## 2018-07-02 PROBLEM — S32.592A FRACTURE OF MULTIPLE PUBIC RAMI, LEFT, CLOSED, INITIAL ENCOUNTER (HCC): Status: RESOLVED | Noted: 2017-06-05 | Resolved: 2018-07-02

## 2018-07-02 PROBLEM — E87.6 HYPOKALEMIA: Status: RESOLVED | Noted: 2018-03-29 | Resolved: 2018-07-02

## 2018-07-02 PROBLEM — R09.02 HYPOXIA: Status: RESOLVED | Noted: 2018-03-29 | Resolved: 2018-07-02

## 2018-07-02 PROBLEM — D72.829 LEUKOCYTOSIS: Status: RESOLVED | Noted: 2018-03-29 | Resolved: 2018-07-02

## 2018-07-02 PROBLEM — J20.9 ACUTE BRONCHITIS, UNSPECIFIED ORGANISM: Status: RESOLVED | Noted: 2018-03-29 | Resolved: 2018-07-02

## 2018-07-17 PROBLEM — G89.29 CHRONIC BILATERAL LOW BACK PAIN WITHOUT SCIATICA: Status: ACTIVE | Noted: 2018-07-17

## 2018-07-17 PROBLEM — R26.89 POOR BALANCE: Status: ACTIVE | Noted: 2018-07-17

## 2018-07-17 PROBLEM — M54.50 CHRONIC BILATERAL LOW BACK PAIN WITHOUT SCIATICA: Status: ACTIVE | Noted: 2018-07-17

## 2018-09-25 ENCOUNTER — CARDPULM VISIT (OUTPATIENT)
Dept: CARDIAC REHAB | Facility: HOSPITAL | Age: 83
End: 2018-09-25
Attending: INTERNAL MEDICINE
Payer: MEDICARE

## 2018-09-25 VITALS
SYSTOLIC BLOOD PRESSURE: 112 MMHG | WEIGHT: 150 LBS | BODY MASS INDEX: 25.61 KG/M2 | RESPIRATION RATE: 16 BRPM | HEART RATE: 73 BPM | OXYGEN SATURATION: 93 % | DIASTOLIC BLOOD PRESSURE: 58 MMHG | HEIGHT: 64 IN

## 2018-09-25 DIAGNOSIS — J84.10 POSTINFLAMMATORY PULMONARY FIBROSIS (HCC): Primary | ICD-10-CM

## 2018-09-25 RX ORDER — ALENDRONATE SODIUM 40 MG/1
40 TABLET ORAL DAILY
COMMUNITY
End: 2018-10-01

## 2018-09-26 ENCOUNTER — CARDPULM VISIT (OUTPATIENT)
Dept: CARDIAC REHAB | Facility: HOSPITAL | Age: 83
End: 2018-09-26
Attending: INTERNAL MEDICINE
Payer: MEDICARE

## 2018-09-27 ENCOUNTER — CARDPULM VISIT (OUTPATIENT)
Dept: CARDIAC REHAB | Facility: HOSPITAL | Age: 83
End: 2018-09-27
Attending: INTERNAL MEDICINE
Payer: MEDICARE

## 2018-09-28 ENCOUNTER — APPOINTMENT (OUTPATIENT)
Dept: CARDIAC REHAB | Facility: HOSPITAL | Age: 83
End: 2018-09-28
Attending: INTERNAL MEDICINE
Payer: MEDICARE

## 2018-10-01 ENCOUNTER — CARDPULM VISIT (OUTPATIENT)
Dept: CARDIAC REHAB | Facility: HOSPITAL | Age: 83
End: 2018-10-01
Attending: INTERNAL MEDICINE
Payer: MEDICARE

## 2018-10-01 RX ORDER — ARIPIPRAZOLE 15 MG/1
20 TABLET ORAL DAILY
Status: ON HOLD | COMMUNITY
End: 2018-10-27

## 2018-10-01 RX ORDER — MAGNESIUM OXIDE 400 MG (241.3 MG MAGNESIUM) TABLET
400 TABLET DAILY
COMMUNITY
End: 2018-11-05

## 2018-10-01 RX ORDER — AMLODIPINE BESYLATE 5 MG/1
5 TABLET ORAL DAILY
COMMUNITY
End: 2020-08-11 | Stop reason: ALTCHOICE

## 2018-10-03 ENCOUNTER — CARDPULM VISIT (OUTPATIENT)
Dept: CARDIAC REHAB | Facility: HOSPITAL | Age: 83
End: 2018-10-03
Attending: INTERNAL MEDICINE
Payer: MEDICARE

## 2018-10-05 ENCOUNTER — CARDPULM VISIT (OUTPATIENT)
Dept: CARDIAC REHAB | Facility: HOSPITAL | Age: 83
End: 2018-10-05
Attending: INTERNAL MEDICINE
Payer: MEDICARE

## 2018-10-08 ENCOUNTER — CARDPULM VISIT (OUTPATIENT)
Dept: CARDIAC REHAB | Facility: HOSPITAL | Age: 83
End: 2018-10-08
Attending: INTERNAL MEDICINE
Payer: MEDICARE

## 2018-10-10 ENCOUNTER — CARDPULM VISIT (OUTPATIENT)
Dept: CARDIAC REHAB | Facility: HOSPITAL | Age: 83
End: 2018-10-10
Attending: INTERNAL MEDICINE
Payer: MEDICARE

## 2018-10-12 ENCOUNTER — CARDPULM VISIT (OUTPATIENT)
Dept: CARDIAC REHAB | Facility: HOSPITAL | Age: 83
End: 2018-10-12
Attending: INTERNAL MEDICINE
Payer: MEDICARE

## 2018-10-12 PROCEDURE — 94618 PULMONARY STRESS TESTING: CPT

## 2018-10-15 ENCOUNTER — CARDPULM VISIT (OUTPATIENT)
Dept: CARDIAC REHAB | Facility: HOSPITAL | Age: 83
End: 2018-10-15
Attending: INTERNAL MEDICINE
Payer: MEDICARE

## 2018-10-17 ENCOUNTER — CARDPULM VISIT (OUTPATIENT)
Dept: CARDIAC REHAB | Facility: HOSPITAL | Age: 83
End: 2018-10-17
Attending: INTERNAL MEDICINE
Payer: MEDICARE

## 2018-10-19 ENCOUNTER — CARDPULM VISIT (OUTPATIENT)
Dept: CARDIAC REHAB | Facility: HOSPITAL | Age: 83
End: 2018-10-19
Attending: INTERNAL MEDICINE
Payer: MEDICARE

## 2018-10-22 ENCOUNTER — APPOINTMENT (OUTPATIENT)
Dept: CARDIAC REHAB | Facility: HOSPITAL | Age: 83
End: 2018-10-22
Attending: INTERNAL MEDICINE
Payer: MEDICARE

## 2018-10-23 ENCOUNTER — CARDPULM VISIT (OUTPATIENT)
Dept: CARDIAC REHAB | Facility: HOSPITAL | Age: 83
End: 2018-10-23
Attending: INTERNAL MEDICINE
Payer: MEDICARE

## 2018-10-24 ENCOUNTER — CARDPULM VISIT (OUTPATIENT)
Dept: CARDIAC REHAB | Facility: HOSPITAL | Age: 83
End: 2018-10-24
Attending: INTERNAL MEDICINE
Payer: MEDICARE

## 2018-10-27 ENCOUNTER — HOSPITAL ENCOUNTER (OUTPATIENT)
Facility: HOSPITAL | Age: 83
Setting detail: OBSERVATION
Discharge: HOME | End: 2018-10-28
Attending: EMERGENCY MEDICINE | Admitting: INTERNAL MEDICINE
Payer: MEDICARE

## 2018-10-27 DIAGNOSIS — R19.7 DIARRHEA, UNSPECIFIED TYPE: Primary | ICD-10-CM

## 2018-10-27 PROCEDURE — 99285 EMERGENCY DEPT VISIT HI MDM: CPT

## 2018-10-27 PROCEDURE — 87046 STOOL CULTR AEROBIC BACT EA: CPT | Performed by: EMERGENCY MEDICINE

## 2018-10-27 PROCEDURE — 80053 COMPREHEN METABOLIC PANEL: CPT | Performed by: EMERGENCY MEDICINE

## 2018-10-27 PROCEDURE — 96360 HYDRATION IV INFUSION INIT: CPT

## 2018-10-27 PROCEDURE — 87045 FECES CULTURE AEROBIC BACT: CPT | Performed by: EMERGENCY MEDICINE

## 2018-10-27 PROCEDURE — 87493 C DIFF AMPLIFIED PROBE: CPT | Performed by: EMERGENCY MEDICINE

## 2018-10-27 PROCEDURE — 87077 CULTURE AEROBIC IDENTIFY: CPT | Performed by: EMERGENCY MEDICINE

## 2018-10-27 PROCEDURE — 87427 SHIGA-LIKE TOXIN AG IA: CPT | Performed by: EMERGENCY MEDICINE

## 2018-10-27 PROCEDURE — 85025 COMPLETE CBC W/AUTO DIFF WBC: CPT | Performed by: EMERGENCY MEDICINE

## 2018-10-27 PROCEDURE — 82272 OCCULT BLD FECES 1-3 TESTS: CPT | Performed by: EMERGENCY MEDICINE

## 2018-10-27 RX ORDER — ACETAMINOPHEN 325 MG/1
650 TABLET ORAL EVERY 6 HOURS PRN
Status: DISCONTINUED | OUTPATIENT
Start: 2018-10-27 | End: 2018-10-28

## 2018-10-27 RX ORDER — ONDANSETRON 2 MG/ML
4 INJECTION INTRAMUSCULAR; INTRAVENOUS ONCE
Status: DISCONTINUED | OUTPATIENT
Start: 2018-10-27 | End: 2018-10-28

## 2018-10-27 RX ORDER — AMLODIPINE BESYLATE 5 MG/1
5 TABLET ORAL DAILY
Status: DISCONTINUED | OUTPATIENT
Start: 2018-10-27 | End: 2018-10-28

## 2018-10-27 RX ORDER — ENOXAPARIN SODIUM 100 MG/ML
40 INJECTION SUBCUTANEOUS DAILY
Status: DISCONTINUED | OUTPATIENT
Start: 2018-10-27 | End: 2018-10-28

## 2018-10-27 RX ORDER — SODIUM CHLORIDE 9 MG/ML
INJECTION, SOLUTION INTRAVENOUS CONTINUOUS
Status: DISCONTINUED | OUTPATIENT
Start: 2018-10-27 | End: 2018-10-28

## 2018-10-27 RX ORDER — PANTOPRAZOLE SODIUM 40 MG/1
40 TABLET, DELAYED RELEASE ORAL
Status: DISCONTINUED | OUTPATIENT
Start: 2018-10-28 | End: 2018-10-28

## 2018-10-27 RX ORDER — PRAVASTATIN SODIUM 10 MG
10 TABLET ORAL NIGHTLY
Status: DISCONTINUED | OUTPATIENT
Start: 2018-10-27 | End: 2018-10-28

## 2018-10-27 RX ORDER — ONDANSETRON 2 MG/ML
4 INJECTION INTRAMUSCULAR; INTRAVENOUS EVERY 6 HOURS PRN
Status: DISCONTINUED | OUTPATIENT
Start: 2018-10-27 | End: 2018-10-28

## 2018-10-27 RX ORDER — SODIUM CHLORIDE 9 MG/ML
125 INJECTION, SOLUTION INTRAVENOUS CONTINUOUS
Status: DISCONTINUED | OUTPATIENT
Start: 2018-10-27 | End: 2018-10-28

## 2018-10-27 RX ORDER — LOSARTAN POTASSIUM 50 MG/1
50 TABLET ORAL DAILY
Status: DISCONTINUED | OUTPATIENT
Start: 2018-10-27 | End: 2018-10-28

## 2018-10-27 RX ORDER — METOPROLOL SUCCINATE 25 MG/1
25 TABLET, EXTENDED RELEASE ORAL
Status: DISCONTINUED | OUTPATIENT
Start: 2018-10-27 | End: 2018-10-28

## 2018-10-27 RX ORDER — POTASSIUM CHLORIDE 1.5 G/1.77G
20 POWDER, FOR SOLUTION ORAL DAILY
COMMUNITY
End: 2019-09-17 | Stop reason: ALTCHOICE

## 2018-10-27 RX ORDER — METOCLOPRAMIDE HYDROCHLORIDE 5 MG/ML
5 INJECTION INTRAMUSCULAR; INTRAVENOUS EVERY 8 HOURS PRN
Status: DISCONTINUED | OUTPATIENT
Start: 2018-10-27 | End: 2018-10-28

## 2018-10-27 RX ORDER — ONDANSETRON 2 MG/ML
4 INJECTION INTRAMUSCULAR; INTRAVENOUS EVERY 4 HOURS PRN
Status: CANCELLED | OUTPATIENT
Start: 2018-10-27

## 2018-10-27 RX ORDER — SODIUM CHLORIDE 9 MG/ML
INJECTION, SOLUTION INTRAVENOUS CONTINUOUS
Status: CANCELLED | OUTPATIENT
Start: 2018-10-27 | End: 2018-10-27

## 2018-10-27 NOTE — ED NOTES
Pt noted to have oxygen sats intermittently down to 86% on room air, will go immediately back up to 89-90%. To monitor and will place on oxygen if observed to continue.

## 2018-10-27 NOTE — PLAN OF CARE
NURSING ADMISSION NOTE      Patient admitted via Cart  Oriented to room. Safety precautions initiated. Bed in low position. Call light in reach. Admission navigator complete.   Patient on 2 LPM - Patient uses 2 LPM PRN at home   Stool cultures sen

## 2018-10-27 NOTE — ED NOTES
Per daughters, pt does wear oxygen at night, 2 liters, and it is not unusual for sats to be in the upper 80's and low 90's. To monitor. Pt's oxygen sat around 89-91 % at this time.

## 2018-10-27 NOTE — PROGRESS NOTES
[facility] Pharmacy Note:  Renal Dose Adjustment for Metoclopramide (REGLAN)    [place] NIKKI Cortes has been prescribed Metoclopramide (REGLAN) 10 mg every 8 hours as needed for NV. Estimated Creatinine Clearance: 36.9 mL/min (based on SCr of 0.91 mg/dL).     H

## 2018-10-27 NOTE — H&P
[facility] Hospitalist History and Physical      Patient presents with:  Nausea/Vomiting/Diarrhea (gastrointestinal)       PCP: Arti Morales MD      History of Present Illness: Patient is a 80year old female with PMH sig for cdiff, afib, chronic low back pa frequency or urgency. MUSCULOSKELETAL:  No arthritis  SKIN:  No change in skin, hair or nails. NEUROLOGIC:  No paresthesias, weakness, or numbness. PSYCHIATRIC:  No disorder of thought or mood.   ENDOCRINE:  No heat or cold intolerance, polyuria or polyd - cont statin    #Gerd cont ppi    PPX: lovenox    DISPO: possible DC tomorrow if diarrhea improved    Reno Ponce DO  [facility] Hospitalist  [phone]    Outpatient records or previous hospital records reviewed.    [facility] hospitalist to continue to follow p

## 2018-10-27 NOTE — ED NOTES
Pt awake and alert, appears comfortable and in no distress. Family at bedside. Awaiting bed assignment.

## 2018-10-27 NOTE — ED INITIAL ASSESSMENT (HPI)
Pt presents to the ED accompanied by family with complaints of  abdominal discomfort and distension and multiple episodes of diarrhea since yesterday. Pt states she had diarrhea any time she eats or drinks. Pt awake and alert, skin w/d,resps reg/unlabored.

## 2018-10-27 NOTE — ED PROVIDER NOTES
Patient Seen in: [facility] Emergency Department    History   Patient presents with:  Nausea/Vomiting/Diarrhea (gastrointestinal)    Stated Complaint: Diarrhea  x 24hrs, liquid stool everytime she drinks/eats    HPI    80-year-old female who resides of Systems    Positive for stated complaint: Diarrhea  x 24hrs, liquid stool everytime she drinks/eats  Other systems are as noted in HPI. Constitutional and vital signs reviewed. All other systems reviewed and negative except as noted above.     Phys -----------         ------                     CBC W/ DIFFERENTIAL[[id number]]          Abnormal            Final result                 Please view results for these tests on the individual orders.    URINALYSIS WITH CULTURE REFLEX   Caitlin Mitchell 10/27/2018 Unknown

## 2018-10-28 VITALS
SYSTOLIC BLOOD PRESSURE: 111 MMHG | DIASTOLIC BLOOD PRESSURE: 62 MMHG | WEIGHT: 144 LBS | OXYGEN SATURATION: 96 % | BODY MASS INDEX: 24.59 KG/M2 | TEMPERATURE: 98 F | RESPIRATION RATE: 16 BRPM | HEIGHT: 64 IN | HEART RATE: 78 BPM

## 2018-10-28 PROCEDURE — 85025 COMPLETE CBC W/AUTO DIFF WBC: CPT | Performed by: INTERNAL MEDICINE

## 2018-10-28 PROCEDURE — 96372 THER/PROPH/DIAG INJ SC/IM: CPT

## 2018-10-28 PROCEDURE — 80048 BASIC METABOLIC PNL TOTAL CA: CPT | Performed by: INTERNAL MEDICINE

## 2018-10-28 NOTE — PROGRESS NOTES
[facility] Hospitalist Progress Note                                                                   [address]  4/27/1930    SUBJECTIVE: pt has not had diarrhea all day yesterday.  She t on regular     #HTN- cont home meds     #HL - cont statin     #Gerd cont ppi     PPX: lovenox    DISPO: can DC later this afternoon if tolerating diet and not having recurrence of diarrhea    Mara Cates  [facility] Hospitalist  [phone]

## 2018-10-28 NOTE — PLAN OF CARE
GASTROINTESTINAL - ADULT    • Minimal or absence of nausea and vomiting Not Progressing    • Maintains or returns to baseline bowel function Not Progressing    • Maintains adequate nutritional intake (undernourished) Not Progressing    • Achieves appropria

## 2018-10-30 NOTE — DISCHARGE SUMMARY
General Medicine Discharge Summary     Patient ID:  Mya Cortes  80year old  4/27/1930    Admit date: 10/27/2018    Discharge date and time: 10/28/2018  4:59 PM     Attending Physician: [place] R-2    albuterol sulfate (2.5 MG/3ML) 0.083% Inhalation Nebu Soln  Take 3 mL (2.5 mg total) by nebulization every 6 (six) hours as needed for Wheezing., Normal, Disp-50 ampule, R-0    OMEPRAZOLE 40 MG Oral Capsule Delayed Release  TAKE 1 CAPSULE(40 MG) BY

## 2018-10-31 ENCOUNTER — APPOINTMENT (OUTPATIENT)
Dept: CARDIAC REHAB | Facility: HOSPITAL | Age: 83
End: 2018-10-31
Attending: INTERNAL MEDICINE
Payer: MEDICARE

## 2018-11-02 ENCOUNTER — HOSPITAL ENCOUNTER (OUTPATIENT)
Facility: HOSPITAL | Age: 83
Setting detail: OBSERVATION
Discharge: HOME | End: 2018-11-04
Attending: EMERGENCY MEDICINE | Admitting: INTERNAL MEDICINE
Payer: MEDICARE

## 2018-11-02 ENCOUNTER — APPOINTMENT (OUTPATIENT)
Dept: CARDIAC REHAB | Facility: HOSPITAL | Age: 83
End: 2018-11-02
Attending: INTERNAL MEDICINE
Payer: MEDICARE

## 2018-11-02 DIAGNOSIS — A03.9: Primary | ICD-10-CM

## 2018-11-02 DIAGNOSIS — E86.0 DEHYDRATION: ICD-10-CM

## 2018-11-02 DIAGNOSIS — R11.2 NAUSEA VOMITING AND DIARRHEA: ICD-10-CM

## 2018-11-02 DIAGNOSIS — T50.905A ADVERSE EFFECT OF DRUG, INITIAL ENCOUNTER: ICD-10-CM

## 2018-11-02 DIAGNOSIS — R19.7 NAUSEA VOMITING AND DIARRHEA: ICD-10-CM

## 2018-11-02 PROBLEM — R73.9 HYPERGLYCEMIA: Status: ACTIVE | Noted: 2018-11-02

## 2018-11-02 PROBLEM — N17.9 ACUTE KIDNEY INJURY: Status: ACTIVE | Noted: 2018-11-02

## 2018-11-02 PROBLEM — N17.9 ACUTE KIDNEY INJURY (HCC): Status: ACTIVE | Noted: 2018-11-02

## 2018-11-02 PROCEDURE — 83735 ASSAY OF MAGNESIUM: CPT | Performed by: EMERGENCY MEDICINE

## 2018-11-02 PROCEDURE — 96365 THER/PROPH/DIAG IV INF INIT: CPT

## 2018-11-02 PROCEDURE — 85025 COMPLETE CBC W/AUTO DIFF WBC: CPT

## 2018-11-02 PROCEDURE — 80053 COMPREHEN METABOLIC PANEL: CPT

## 2018-11-02 PROCEDURE — 96375 TX/PRO/DX INJ NEW DRUG ADDON: CPT

## 2018-11-02 PROCEDURE — 80053 COMPREHEN METABOLIC PANEL: CPT | Performed by: EMERGENCY MEDICINE

## 2018-11-02 PROCEDURE — 93005 ELECTROCARDIOGRAM TRACING: CPT

## 2018-11-02 PROCEDURE — 93010 ELECTROCARDIOGRAM REPORT: CPT

## 2018-11-02 PROCEDURE — 99285 EMERGENCY DEPT VISIT HI MDM: CPT

## 2018-11-02 PROCEDURE — S0028 INJECTION, FAMOTIDINE, 20 MG: HCPCS | Performed by: EMERGENCY MEDICINE

## 2018-11-02 PROCEDURE — 85025 COMPLETE CBC W/AUTO DIFF WBC: CPT | Performed by: EMERGENCY MEDICINE

## 2018-11-02 RX ORDER — ONDANSETRON 2 MG/ML
4 INJECTION INTRAMUSCULAR; INTRAVENOUS ONCE
Status: COMPLETED | OUTPATIENT
Start: 2018-11-02 | End: 2018-11-02

## 2018-11-02 RX ORDER — FAMOTIDINE 10 MG/ML
20 INJECTION, SOLUTION INTRAVENOUS ONCE
Status: COMPLETED | OUTPATIENT
Start: 2018-11-02 | End: 2018-11-02

## 2018-11-03 PROCEDURE — 96376 TX/PRO/DX INJ SAME DRUG ADON: CPT

## 2018-11-03 PROCEDURE — 80048 BASIC METABOLIC PNL TOTAL CA: CPT | Performed by: HOSPITALIST

## 2018-11-03 PROCEDURE — 83735 ASSAY OF MAGNESIUM: CPT | Performed by: HOSPITALIST

## 2018-11-03 PROCEDURE — 96361 HYDRATE IV INFUSION ADD-ON: CPT

## 2018-11-03 PROCEDURE — 82962 GLUCOSE BLOOD TEST: CPT

## 2018-11-03 PROCEDURE — 85025 COMPLETE CBC W/AUTO DIFF WBC: CPT | Performed by: HOSPITALIST

## 2018-11-03 RX ORDER — ONDANSETRON 2 MG/ML
4 INJECTION INTRAMUSCULAR; INTRAVENOUS EVERY 6 HOURS PRN
Status: DISCONTINUED | OUTPATIENT
Start: 2018-11-03 | End: 2018-11-04

## 2018-11-03 RX ORDER — DOCUSATE SODIUM 100 MG/1
100 CAPSULE, LIQUID FILLED ORAL 2 TIMES DAILY
Status: DISCONTINUED | OUTPATIENT
Start: 2018-11-03 | End: 2018-11-04

## 2018-11-03 RX ORDER — MAGNESIUM OXIDE 400 MG (241.3 MG MAGNESIUM) TABLET
400 TABLET DAILY
Status: DISCONTINUED | OUTPATIENT
Start: 2018-11-03 | End: 2018-11-04

## 2018-11-03 RX ORDER — PRAVASTATIN SODIUM 10 MG
10 TABLET ORAL NIGHTLY
Status: DISCONTINUED | OUTPATIENT
Start: 2018-11-03 | End: 2018-11-04

## 2018-11-03 RX ORDER — ONDANSETRON 2 MG/ML
4 INJECTION INTRAMUSCULAR; INTRAVENOUS EVERY 4 HOURS PRN
Status: DISCONTINUED | OUTPATIENT
Start: 2018-11-03 | End: 2018-11-03

## 2018-11-03 RX ORDER — MAGNESIUM OXIDE 400 MG (241.3 MG MAGNESIUM) TABLET
400 TABLET ONCE
Status: COMPLETED | OUTPATIENT
Start: 2018-11-03 | End: 2018-11-03

## 2018-11-03 RX ORDER — SODIUM CHLORIDE 9 MG/ML
INJECTION, SOLUTION INTRAVENOUS CONTINUOUS
Status: DISCONTINUED | OUTPATIENT
Start: 2018-11-03 | End: 2018-11-03

## 2018-11-03 RX ORDER — BISACODYL 10 MG
10 SUPPOSITORY, RECTAL RECTAL
Status: DISCONTINUED | OUTPATIENT
Start: 2018-11-03 | End: 2018-11-04

## 2018-11-03 RX ORDER — SODIUM CHLORIDE 9 MG/ML
INJECTION, SOLUTION INTRAVENOUS CONTINUOUS
Status: DISCONTINUED | OUTPATIENT
Start: 2018-11-03 | End: 2018-11-04

## 2018-11-03 RX ORDER — METOCLOPRAMIDE HYDROCHLORIDE 5 MG/ML
5 INJECTION INTRAMUSCULAR; INTRAVENOUS EVERY 8 HOURS PRN
Status: DISCONTINUED | OUTPATIENT
Start: 2018-11-03 | End: 2018-11-04

## 2018-11-03 RX ORDER — PANTOPRAZOLE SODIUM 40 MG/1
40 TABLET, DELAYED RELEASE ORAL
Status: DISCONTINUED | OUTPATIENT
Start: 2018-11-03 | End: 2018-11-04

## 2018-11-03 RX ORDER — AMLODIPINE BESYLATE 5 MG/1
5 TABLET ORAL DAILY
Status: DISCONTINUED | OUTPATIENT
Start: 2018-11-03 | End: 2018-11-04

## 2018-11-03 RX ORDER — POLYETHYLENE GLYCOL 3350 17 G/17G
17 POWDER, FOR SOLUTION ORAL DAILY PRN
Status: DISCONTINUED | OUTPATIENT
Start: 2018-11-03 | End: 2018-11-04

## 2018-11-03 RX ORDER — SACCHAROMYCES BOULARDII 250 MG
250 CAPSULE ORAL 2 TIMES DAILY
Status: DISCONTINUED | OUTPATIENT
Start: 2018-11-03 | End: 2018-11-03

## 2018-11-03 RX ORDER — METOPROLOL SUCCINATE 25 MG/1
25 TABLET, EXTENDED RELEASE ORAL
Status: DISCONTINUED | OUTPATIENT
Start: 2018-11-03 | End: 2018-11-04

## 2018-11-03 RX ORDER — LOSARTAN POTASSIUM 50 MG/1
50 TABLET ORAL DAILY
Status: DISCONTINUED | OUTPATIENT
Start: 2018-11-03 | End: 2018-11-04

## 2018-11-03 RX ORDER — ZINC SULFATE 50(220)MG
220 CAPSULE ORAL DAILY
Status: DISCONTINUED | OUTPATIENT
Start: 2018-11-03 | End: 2018-11-04

## 2018-11-03 NOTE — ED INITIAL ASSESSMENT (HPI)
Pt admitted to hospital last week for diarrhea, today PT was prescribed Doxycycline Hyclate 100mg and began having nausea and vomiting (Pt vomited about 4x today);  Pt was dx by [facility] w/ Vibrio

## 2018-11-03 NOTE — H&P
.  CC: Patient presents with:  Abdomen/Flank Pain (GI/)       PCP: Lord Geovanna MD    History of Present Illness: Patient is a 80year old female with PMH sig for afib, c diff who presented with n/v last night.  She had just been here for diarrhea an Carbonate-Vitamin D 600-400 MG-UNIT Oral Tab Take 1 tablet by mouth 2 (two) times daily.  Disp:  Rfl:          Soc Hx  Social History    Tobacco Use      Smoking status: Former Smoker        Packs/day: 0.50        Years: 20.00        Pack years: 10      Smo d/c    Diarrhea/vibrio parahemolyticus- pt had consumed raw oysters recently which was likely source. Received dose of ceftriaxone here instead.    - appreciate ID recs, no further abx needed  - had recent neg c diff    Afib/HTN- on BB, ARB, CCB    Home tod

## 2018-11-03 NOTE — PROGRESS NOTES
Pharmacy Note: Renal dose adjustment for Metoclopramide (Reglan)  [place] NIKKI Sebastian has been prescribed Metoclopramide (Reglan) 10 mg every 8 hours as needed. Estimated Creatinine Clearance: 29 mL/min (A) (based on SCr of 1.11 mg/dL (H)).     Her calcu

## 2018-11-03 NOTE — PROGRESS NOTES
Pt. Transferred from ED to room 514 via cart and daughter, Edward Julio, at bedside. Admission navigator completed by daughter, Ramona Mendez to unit and room. Bed in lowest and locked position. Call light within reach. Will continue to monitor.

## 2018-11-03 NOTE — CONSULTS
INFECTIOUS DISEASE CONSULT NOTE    Billie Cortes Patient Status:  Observation    1930 MRN [id number]   [facility] 5NW-A Attending Wm Guillaume MD   Hosp Day # 0 PCP Toshia Pace drugs.     Allergies:    Doxycycline             NAUSEA AND VOMITING  Ciprofloxacin               Medications:    Current Facility-Administered Medications:   •  AmLODIPine Besylate (NORVASC) tab 5 mg, 5 mg, Oral, Daily  •  Calcium Carbonate-Vitamin D (OYST auscultation bilaterally. No wheezes. No rhonchi. Cardiovascular: S1, S2.  Regular rate and rhythm. .  Abdomen: Soft, nontender, nondistended. Positive bowel sounds. Musculoskeletal: Full range of motion of all extremities. Integument: No lesions.  Adrian Talamantes

## 2018-11-04 VITALS
SYSTOLIC BLOOD PRESSURE: 114 MMHG | DIASTOLIC BLOOD PRESSURE: 47 MMHG | HEART RATE: 68 BPM | TEMPERATURE: 99 F | OXYGEN SATURATION: 95 % | BODY MASS INDEX: 26.13 KG/M2 | HEIGHT: 63 IN | RESPIRATION RATE: 18 BRPM | WEIGHT: 147.5 LBS

## 2018-11-04 PROCEDURE — 90471 IMMUNIZATION ADMIN: CPT

## 2018-11-04 PROCEDURE — 96361 HYDRATE IV INFUSION ADD-ON: CPT

## 2018-11-04 NOTE — DISCHARGE SUMMARY
General Medicine Discharge Summary     Patient ID:  Soham Cortes  80year old  4/27/1930    Admit date: 11/2/2018    Discharge date and time: 11/4/18    Attending Physician: Mendy Pineda MD     Primary Care Physician: MD NISHANT Fine daily.    magnesium oxide 400 MG Oral Tab  Take 400 mg by mouth daily. OMEPRAZOLE 40 MG Oral Capsule Delayed Release  TAKE 1 CAPSULE(40 MG) BY MOUTH DAILY    Losartan Potassium 50 MG Oral Tab  Take 50 mg by mouth daily.       Metoprolol Succinate ER 25 M

## 2018-11-04 NOTE — PROGRESS NOTES
[facility]  TEL: [phone]  FAX: [phone]    Myriam Cortes Patient Status:  Observation    1930 MRN [id number]   [facility] 5NW-A Attending Renetta Cannon MD   Hosp Day # 0 PCP Jarod Gaitan parahaemolyticus  - usually a self limited illness, seems to be resolving. Has not had any more diarrhea. Suspect n/v were abx side effects and not due to gastroenteritis.  Seems like she took a larger dose than usual, could cause more side effects  - since

## 2018-11-09 ENCOUNTER — CARDPULM VISIT (OUTPATIENT)
Dept: CARDIAC REHAB | Facility: HOSPITAL | Age: 83
End: 2018-11-09
Attending: INTERNAL MEDICINE
Payer: MEDICARE

## 2018-11-12 ENCOUNTER — CARDPULM VISIT (OUTPATIENT)
Dept: CARDIAC REHAB | Facility: HOSPITAL | Age: 83
End: 2018-11-12
Attending: INTERNAL MEDICINE
Payer: MEDICARE

## 2018-11-14 ENCOUNTER — CARDPULM VISIT (OUTPATIENT)
Dept: CARDIAC REHAB | Facility: HOSPITAL | Age: 83
End: 2018-11-14
Attending: INTERNAL MEDICINE
Payer: MEDICARE

## 2018-11-16 ENCOUNTER — CARDPULM VISIT (OUTPATIENT)
Dept: CARDIAC REHAB | Facility: HOSPITAL | Age: 83
End: 2018-11-16
Attending: INTERNAL MEDICINE
Payer: MEDICARE

## 2018-11-19 ENCOUNTER — CARDPULM VISIT (OUTPATIENT)
Dept: CARDIAC REHAB | Facility: HOSPITAL | Age: 83
End: 2018-11-19
Attending: INTERNAL MEDICINE
Payer: MEDICARE

## 2018-11-21 ENCOUNTER — CARDPULM VISIT (OUTPATIENT)
Dept: CARDIAC REHAB | Facility: HOSPITAL | Age: 83
End: 2018-11-21
Attending: INTERNAL MEDICINE
Payer: MEDICARE

## 2018-11-23 ENCOUNTER — APPOINTMENT (OUTPATIENT)
Dept: CARDIAC REHAB | Facility: HOSPITAL | Age: 83
End: 2018-11-23
Attending: INTERNAL MEDICINE
Payer: MEDICARE

## 2018-11-26 ENCOUNTER — CARDPULM VISIT (OUTPATIENT)
Dept: CARDIAC REHAB | Facility: HOSPITAL | Age: 83
End: 2018-11-26
Attending: INTERNAL MEDICINE
Payer: MEDICARE

## 2018-11-28 ENCOUNTER — CARDPULM VISIT (OUTPATIENT)
Dept: CARDIAC REHAB | Facility: HOSPITAL | Age: 83
End: 2018-11-28
Attending: INTERNAL MEDICINE
Payer: MEDICARE

## 2018-11-30 ENCOUNTER — CARDPULM VISIT (OUTPATIENT)
Dept: CARDIAC REHAB | Facility: HOSPITAL | Age: 83
End: 2018-11-30
Attending: INTERNAL MEDICINE
Payer: MEDICARE

## 2018-12-03 ENCOUNTER — CARDPULM VISIT (OUTPATIENT)
Dept: CARDIAC REHAB | Facility: HOSPITAL | Age: 83
End: 2018-12-03
Attending: INTERNAL MEDICINE
Payer: MEDICARE

## 2018-12-05 ENCOUNTER — CARDPULM VISIT (OUTPATIENT)
Dept: CARDIAC REHAB | Facility: HOSPITAL | Age: 83
End: 2018-12-05
Attending: INTERNAL MEDICINE
Payer: MEDICARE

## 2018-12-07 ENCOUNTER — CARDPULM VISIT (OUTPATIENT)
Dept: CARDIAC REHAB | Facility: HOSPITAL | Age: 83
End: 2018-12-07
Attending: INTERNAL MEDICINE
Payer: MEDICARE

## 2018-12-10 ENCOUNTER — CARDPULM VISIT (OUTPATIENT)
Dept: CARDIAC REHAB | Facility: HOSPITAL | Age: 83
End: 2018-12-10
Attending: INTERNAL MEDICINE
Payer: MEDICARE

## 2018-12-12 ENCOUNTER — CARDPULM VISIT (OUTPATIENT)
Dept: CARDIAC REHAB | Facility: HOSPITAL | Age: 83
End: 2018-12-12
Attending: INTERNAL MEDICINE
Payer: MEDICARE

## 2018-12-14 ENCOUNTER — CARDPULM VISIT (OUTPATIENT)
Dept: CARDIAC REHAB | Facility: HOSPITAL | Age: 83
End: 2018-12-14
Attending: INTERNAL MEDICINE
Payer: MEDICARE

## 2018-12-17 ENCOUNTER — CARDPULM VISIT (OUTPATIENT)
Dept: CARDIAC REHAB | Facility: HOSPITAL | Age: 83
End: 2018-12-17
Attending: INTERNAL MEDICINE
Payer: MEDICARE

## 2018-12-19 ENCOUNTER — CARDPULM VISIT (OUTPATIENT)
Dept: CARDIAC REHAB | Facility: HOSPITAL | Age: 83
End: 2018-12-19
Attending: INTERNAL MEDICINE
Payer: MEDICARE

## 2018-12-21 ENCOUNTER — CARDPULM VISIT (OUTPATIENT)
Dept: CARDIAC REHAB | Facility: HOSPITAL | Age: 83
End: 2018-12-21
Attending: INTERNAL MEDICINE
Payer: MEDICARE

## 2018-12-24 ENCOUNTER — APPOINTMENT (OUTPATIENT)
Dept: CARDIAC REHAB | Facility: HOSPITAL | Age: 83
End: 2018-12-24
Attending: INTERNAL MEDICINE
Payer: MEDICARE

## 2018-12-26 ENCOUNTER — APPOINTMENT (OUTPATIENT)
Dept: CARDIAC REHAB | Facility: HOSPITAL | Age: 83
End: 2018-12-26
Attending: INTERNAL MEDICINE
Payer: MEDICARE

## 2018-12-28 ENCOUNTER — CARDPULM VISIT (OUTPATIENT)
Dept: CARDIAC REHAB | Facility: HOSPITAL | Age: 83
End: 2018-12-28
Attending: INTERNAL MEDICINE
Payer: MEDICARE

## 2018-12-31 ENCOUNTER — CARDPULM VISIT (OUTPATIENT)
Dept: CARDIAC REHAB | Facility: HOSPITAL | Age: 83
End: 2018-12-31
Attending: INTERNAL MEDICINE
Payer: MEDICARE

## 2019-01-02 ENCOUNTER — CARDPULM VISIT (OUTPATIENT)
Dept: CARDIAC REHAB | Facility: HOSPITAL | Age: 84
End: 2019-01-02
Attending: INTERNAL MEDICINE
Payer: MEDICARE

## 2019-01-04 ENCOUNTER — APPOINTMENT (OUTPATIENT)
Dept: CARDIAC REHAB | Facility: HOSPITAL | Age: 84
End: 2019-01-04
Attending: INTERNAL MEDICINE
Payer: MEDICARE

## 2019-01-07 ENCOUNTER — APPOINTMENT (OUTPATIENT)
Dept: CARDIAC REHAB | Facility: HOSPITAL | Age: 84
End: 2019-01-07
Attending: INTERNAL MEDICINE
Payer: MEDICARE

## 2019-01-08 ENCOUNTER — CARDPULM VISIT (OUTPATIENT)
Dept: CARDIAC REHAB | Facility: HOSPITAL | Age: 84
End: 2019-01-08
Attending: INTERNAL MEDICINE

## 2019-01-08 ENCOUNTER — CARDPULM VISIT (OUTPATIENT)
Dept: CARDIAC REHAB | Facility: HOSPITAL | Age: 84
End: 2019-01-08
Attending: INTERNAL MEDICINE
Payer: MEDICARE

## 2019-01-08 PROCEDURE — 94618 PULMONARY STRESS TESTING: CPT

## 2019-01-09 ENCOUNTER — APPOINTMENT (OUTPATIENT)
Dept: CARDIAC REHAB | Facility: HOSPITAL | Age: 84
End: 2019-01-09
Attending: INTERNAL MEDICINE
Payer: MEDICARE

## 2019-01-11 ENCOUNTER — APPOINTMENT (OUTPATIENT)
Dept: CARDIAC REHAB | Facility: HOSPITAL | Age: 84
End: 2019-01-11
Attending: INTERNAL MEDICINE
Payer: MEDICARE

## 2019-01-14 ENCOUNTER — APPOINTMENT (OUTPATIENT)
Dept: CARDIAC REHAB | Facility: HOSPITAL | Age: 84
End: 2019-01-14
Attending: INTERNAL MEDICINE
Payer: MEDICARE

## 2019-01-17 ENCOUNTER — CARDPULM VISIT (OUTPATIENT)
Dept: CARDIAC REHAB | Facility: HOSPITAL | Age: 84
End: 2019-01-17
Attending: INTERNAL MEDICINE

## 2019-02-14 ENCOUNTER — CARDPULM VISIT (OUTPATIENT)
Dept: CARDIAC REHAB | Facility: HOSPITAL | Age: 84
End: 2019-02-14
Attending: INTERNAL MEDICINE

## 2019-03-05 PROBLEM — J84.10 PULMONARY FIBROSIS (HCC): Status: ACTIVE | Noted: 2019-03-05

## 2019-03-26 ENCOUNTER — CARDPULM VISIT (OUTPATIENT)
Dept: CARDIAC REHAB | Facility: HOSPITAL | Age: 84
End: 2019-03-26
Attending: INTERNAL MEDICINE

## 2019-04-25 ENCOUNTER — CARDPULM VISIT (OUTPATIENT)
Dept: CARDIAC REHAB | Facility: HOSPITAL | Age: 84
End: 2019-04-25
Attending: INTERNAL MEDICINE

## 2019-05-09 ENCOUNTER — CARDPULM VISIT (OUTPATIENT)
Dept: CARDIAC REHAB | Facility: HOSPITAL | Age: 84
End: 2019-05-09
Attending: INTERNAL MEDICINE

## 2019-05-30 ENCOUNTER — CARDPULM VISIT (OUTPATIENT)
Dept: CARDIAC REHAB | Facility: HOSPITAL | Age: 84
End: 2019-05-30
Attending: INTERNAL MEDICINE

## 2019-06-20 ENCOUNTER — CARDPULM VISIT (OUTPATIENT)
Dept: CARDIAC REHAB | Facility: HOSPITAL | Age: 84
End: 2019-06-20
Attending: INTERNAL MEDICINE

## 2019-07-18 ENCOUNTER — CARDPULM VISIT (OUTPATIENT)
Dept: CARDIAC REHAB | Facility: HOSPITAL | Age: 84
End: 2019-07-18
Attending: INTERNAL MEDICINE

## 2019-08-14 ENCOUNTER — APPOINTMENT (OUTPATIENT)
Dept: GENERAL RADIOLOGY | Facility: HOSPITAL | Age: 84
DRG: 445 | End: 2019-08-14
Attending: INTERNAL MEDICINE
Payer: MEDICARE

## 2019-08-14 ENCOUNTER — HOSPITAL ENCOUNTER (INPATIENT)
Facility: HOSPITAL | Age: 84
LOS: 1 days | Discharge: HOME | DRG: 445 | End: 2019-08-15
Attending: EMERGENCY MEDICINE | Admitting: INTERNAL MEDICINE
Payer: MEDICARE

## 2019-08-14 ENCOUNTER — APPOINTMENT (OUTPATIENT)
Dept: CT IMAGING | Facility: HOSPITAL | Age: 84
DRG: 445 | End: 2019-08-14
Attending: EMERGENCY MEDICINE
Payer: MEDICARE

## 2019-08-14 ENCOUNTER — ANESTHESIA (OUTPATIENT)
Dept: ENDOSCOPY | Facility: HOSPITAL | Age: 84
DRG: 445 | End: 2019-08-14
Payer: MEDICARE

## 2019-08-14 ENCOUNTER — ANESTHESIA EVENT (OUTPATIENT)
Dept: ENDOSCOPY | Facility: HOSPITAL | Age: 84
DRG: 445 | End: 2019-08-14
Payer: MEDICARE

## 2019-08-14 ENCOUNTER — APPOINTMENT (OUTPATIENT)
Dept: GENERAL RADIOLOGY | Facility: HOSPITAL | Age: 84
DRG: 445 | End: 2019-08-14
Attending: EMERGENCY MEDICINE
Payer: MEDICARE

## 2019-08-14 DIAGNOSIS — I10 BENIGN ESSENTIAL HTN: ICD-10-CM

## 2019-08-14 DIAGNOSIS — R07.9 ACUTE CHEST PAIN: Primary | ICD-10-CM

## 2019-08-14 DIAGNOSIS — I48.0 PAROXYSMAL ATRIAL FIBRILLATION (HCC): ICD-10-CM

## 2019-08-14 DIAGNOSIS — K80.50 CHOLEDOCHOLITHIASIS: ICD-10-CM

## 2019-08-14 DIAGNOSIS — I44.7 LBBB (LEFT BUNDLE BRANCH BLOCK): ICD-10-CM

## 2019-08-14 LAB
ALBUMIN SERPL-MCNC: 3.7 G/DL (ref 3.4–5)
ALBUMIN/GLOB SERPL: 0.9 {RATIO} (ref 1–2)
ALP LIVER SERPL-CCNC: 98 U/L (ref 55–142)
ALT SERPL-CCNC: 21 U/L (ref 13–56)
ANION GAP SERPL CALC-SCNC: 7 MMOL/L (ref 0–18)
APTT PPP: 25.2 SECONDS (ref 25.4–36.1)
AST SERPL-CCNC: 14 U/L (ref 15–37)
ATRIAL RATE: 83 BPM
BASOPHILS # BLD AUTO: 0.13 X10(3) UL (ref 0–0.2)
BASOPHILS NFR BLD AUTO: 0.8 %
BILIRUB SERPL-MCNC: 0.3 MG/DL (ref 0.1–2)
BUN BLD-MCNC: 14 MG/DL (ref 7–18)
BUN/CREAT SERPL: 13.9 (ref 10–20)
CALCIUM BLD-MCNC: 9 MG/DL (ref 8.5–10.1)
CHLORIDE SERPL-SCNC: 106 MMOL/L (ref 98–112)
CK SERPL-CCNC: 47 U/L (ref 26–192)
CO2 SERPL-SCNC: 26 MMOL/L (ref 21–32)
CREAT BLD-MCNC: 1.01 MG/DL (ref 0.55–1.02)
DEPRECATED RDW RBC AUTO: 41.7 FL (ref 35.1–46.3)
EOSINOPHIL # BLD AUTO: 0.1 X10(3) UL (ref 0–0.7)
EOSINOPHIL NFR BLD AUTO: 0.6 %
ERYTHROCYTE [DISTWIDTH] IN BLOOD BY AUTOMATED COUNT: 14 % (ref 11–15)
GLOBULIN PLAS-MCNC: 4.1 G/DL (ref 2.8–4.4)
GLUCOSE BLD-MCNC: 107 MG/DL (ref 70–99)
GLUCOSE BLD-MCNC: 133 MG/DL (ref 70–99)
HCT VFR BLD AUTO: 40 % (ref 35–48)
HGB BLD-MCNC: 12.9 G/DL (ref 12–16)
IMM GRANULOCYTES # BLD AUTO: 0.04 X10(3) UL (ref 0–1)
IMM GRANULOCYTES NFR BLD: 0.2 %
INR BLD: 0.98 (ref 0.9–1.1)
LIPASE SERPL-CCNC: 81 U/L (ref 73–393)
LYMPHOCYTES # BLD AUTO: 1.65 X10(3) UL (ref 1–4)
LYMPHOCYTES NFR BLD AUTO: 9.5 %
M PROTEIN MFR SERPL ELPH: 7.8 G/DL (ref 6.4–8.2)
MCH RBC QN AUTO: 26.7 PG (ref 26–34)
MCHC RBC AUTO-ENTMCNC: 32.3 G/DL (ref 31–37)
MCV RBC AUTO: 82.6 FL (ref 80–100)
MONOCYTES # BLD AUTO: 0.76 X10(3) UL (ref 0.1–1)
MONOCYTES NFR BLD AUTO: 4.4 %
NEUTROPHILS # BLD AUTO: 14.62 X10 (3) UL (ref 1.5–7.7)
NEUTROPHILS # BLD AUTO: 14.62 X10(3) UL (ref 1.5–7.7)
NEUTROPHILS NFR BLD AUTO: 84.5 %
OSMOLALITY SERPL CALC.SUM OF ELEC: 290 MOSM/KG (ref 275–295)
P AXIS: 43 DEGREES
P-R INTERVAL: 170 MS
PLATELET # BLD AUTO: 294 10(3)UL (ref 150–450)
POTASSIUM SERPL-SCNC: 3.8 MMOL/L (ref 3.5–5.1)
PSA SERPL DL<=0.01 NG/ML-MCNC: 13.4 SECONDS (ref 12.5–14.7)
Q-T INTERVAL: 418 MS
QRS DURATION: 138 MS
QTC CALCULATION (BEZET): 491 MS
R AXIS: -18 DEGREES
RBC # BLD AUTO: 4.84 X10(6)UL (ref 3.8–5.3)
SODIUM SERPL-SCNC: 139 MMOL/L (ref 136–145)
T AXIS: 111 DEGREES
TROPONIN I SERPL-MCNC: <0.045 NG/ML (ref ?–0.04)
TROPONIN I SERPL-MCNC: <0.045 NG/ML (ref ?–0.04)
VENTRICULAR RATE: 83 BPM
WBC # BLD AUTO: 17.3 X10(3) UL (ref 4–11)

## 2019-08-14 PROCEDURE — 85610 PROTHROMBIN TIME: CPT | Performed by: EMERGENCY MEDICINE

## 2019-08-14 PROCEDURE — 96376 TX/PRO/DX INJ SAME DRUG ADON: CPT

## 2019-08-14 PROCEDURE — 82962 GLUCOSE BLOOD TEST: CPT

## 2019-08-14 PROCEDURE — 93010 ELECTROCARDIOGRAM REPORT: CPT

## 2019-08-14 PROCEDURE — 82550 ASSAY OF CK (CPK): CPT | Performed by: EMERGENCY MEDICINE

## 2019-08-14 PROCEDURE — 84484 ASSAY OF TROPONIN QUANT: CPT | Performed by: EMERGENCY MEDICINE

## 2019-08-14 PROCEDURE — 0FC98ZZ EXTIRPATION OF MATTER FROM COMMON BILE DUCT, VIA NATURAL OR ARTIFICIAL OPENING ENDOSCOPIC: ICD-10-PCS | Performed by: INTERNAL MEDICINE

## 2019-08-14 PROCEDURE — 83690 ASSAY OF LIPASE: CPT | Performed by: EMERGENCY MEDICINE

## 2019-08-14 PROCEDURE — 96374 THER/PROPH/DIAG INJ IV PUSH: CPT

## 2019-08-14 PROCEDURE — 99285 EMERGENCY DEPT VISIT HI MDM: CPT

## 2019-08-14 PROCEDURE — 71045 X-RAY EXAM CHEST 1 VIEW: CPT | Performed by: EMERGENCY MEDICINE

## 2019-08-14 PROCEDURE — 74177 CT ABD & PELVIS W/CONTRAST: CPT | Performed by: EMERGENCY MEDICINE

## 2019-08-14 PROCEDURE — 96375 TX/PRO/DX INJ NEW DRUG ADDON: CPT

## 2019-08-14 PROCEDURE — 71275 CT ANGIOGRAPHY CHEST: CPT | Performed by: EMERGENCY MEDICINE

## 2019-08-14 PROCEDURE — BF10YZZ FLUOROSCOPY OF BILE DUCTS USING OTHER CONTRAST: ICD-10-PCS | Performed by: INTERNAL MEDICINE

## 2019-08-14 PROCEDURE — 93005 ELECTROCARDIOGRAM TRACING: CPT

## 2019-08-14 PROCEDURE — 85025 COMPLETE CBC W/AUTO DIFF WBC: CPT | Performed by: EMERGENCY MEDICINE

## 2019-08-14 PROCEDURE — 80053 COMPREHEN METABOLIC PANEL: CPT | Performed by: EMERGENCY MEDICINE

## 2019-08-14 PROCEDURE — 85730 THROMBOPLASTIN TIME PARTIAL: CPT | Performed by: EMERGENCY MEDICINE

## 2019-08-14 PROCEDURE — 74328 X-RAY BILE DUCT ENDOSCOPY: CPT | Performed by: INTERNAL MEDICINE

## 2019-08-14 RX ORDER — OMEPRAZOLE 40 MG/1
40 CAPSULE, DELAYED RELEASE ORAL DAILY
COMMUNITY
End: 2019-09-17 | Stop reason: ALTCHOICE

## 2019-08-14 RX ORDER — SODIUM CHLORIDE 9 MG/ML
INJECTION, SOLUTION INTRAVENOUS CONTINUOUS
Status: DISCONTINUED | OUTPATIENT
Start: 2019-08-14 | End: 2019-08-15

## 2019-08-14 RX ORDER — MORPHINE SULFATE 4 MG/ML
2 INJECTION, SOLUTION INTRAMUSCULAR; INTRAVENOUS ONCE
Status: DISCONTINUED | OUTPATIENT
Start: 2019-08-14 | End: 2019-08-14

## 2019-08-14 RX ORDER — ONDANSETRON 2 MG/ML
INJECTION INTRAMUSCULAR; INTRAVENOUS
Status: DISPENSED
Start: 2019-08-14 | End: 2019-08-14

## 2019-08-14 RX ORDER — AMLODIPINE BESYLATE 5 MG/1
5 TABLET ORAL DAILY
Status: DISCONTINUED | OUTPATIENT
Start: 2019-08-15 | End: 2019-08-15

## 2019-08-14 RX ORDER — IPRATROPIUM BROMIDE AND ALBUTEROL SULFATE 2.5; .5 MG/3ML; MG/3ML
3 SOLUTION RESPIRATORY (INHALATION) EVERY 6 HOURS PRN
Status: DISCONTINUED | OUTPATIENT
Start: 2019-08-14 | End: 2019-08-15

## 2019-08-14 RX ORDER — MORPHINE SULFATE 4 MG/ML
2 INJECTION, SOLUTION INTRAMUSCULAR; INTRAVENOUS ONCE
Status: COMPLETED | OUTPATIENT
Start: 2019-08-14 | End: 2019-08-14

## 2019-08-14 RX ORDER — MORPHINE SULFATE 4 MG/ML
INJECTION, SOLUTION INTRAMUSCULAR; INTRAVENOUS
Status: DISPENSED
Start: 2019-08-14 | End: 2019-08-14

## 2019-08-14 RX ORDER — METOPROLOL SUCCINATE 25 MG/1
25 TABLET, EXTENDED RELEASE ORAL
Status: DISCONTINUED | OUTPATIENT
Start: 2019-08-14 | End: 2019-08-15

## 2019-08-14 RX ORDER — MORPHINE SULFATE 4 MG/ML
4 INJECTION, SOLUTION INTRAMUSCULAR; INTRAVENOUS EVERY 30 MIN PRN
Status: DISCONTINUED | OUTPATIENT
Start: 2019-08-14 | End: 2019-08-14

## 2019-08-14 RX ORDER — PRAVASTATIN SODIUM 10 MG
10 TABLET ORAL NIGHTLY
Status: DISCONTINUED | OUTPATIENT
Start: 2019-08-14 | End: 2019-08-15

## 2019-08-14 RX ORDER — ACETAMINOPHEN 325 MG/1
650 TABLET ORAL EVERY 6 HOURS PRN
Status: DISCONTINUED | OUTPATIENT
Start: 2019-08-14 | End: 2019-08-15

## 2019-08-14 RX ORDER — MAGNESIUM OXIDE 400 MG (241.3 MG MAGNESIUM) TABLET
400 TABLET DAILY
COMMUNITY
End: 2019-09-17 | Stop reason: ALTCHOICE

## 2019-08-14 RX ORDER — PRAVASTATIN SODIUM 10 MG
10 TABLET ORAL NIGHTLY
COMMUNITY
End: 2019-09-17 | Stop reason: ALTCHOICE

## 2019-08-14 RX ORDER — HEPARIN SODIUM 5000 [USP'U]/ML
5000 INJECTION, SOLUTION INTRAVENOUS; SUBCUTANEOUS EVERY 8 HOURS SCHEDULED
Status: DISCONTINUED | OUTPATIENT
Start: 2019-08-15 | End: 2019-08-15

## 2019-08-14 RX ORDER — ONDANSETRON 2 MG/ML
4 INJECTION INTRAMUSCULAR; INTRAVENOUS EVERY 4 HOURS PRN
Status: DISCONTINUED | OUTPATIENT
Start: 2019-08-14 | End: 2019-08-15

## 2019-08-14 RX ORDER — MORPHINE SULFATE 4 MG/ML
1 INJECTION, SOLUTION INTRAMUSCULAR; INTRAVENOUS EVERY 4 HOURS PRN
Status: DISCONTINUED | OUTPATIENT
Start: 2019-08-14 | End: 2019-08-15

## 2019-08-14 RX ORDER — MORPHINE SULFATE 4 MG/ML
2 INJECTION, SOLUTION INTRAMUSCULAR; INTRAVENOUS EVERY 4 HOURS PRN
Status: DISCONTINUED | OUTPATIENT
Start: 2019-08-14 | End: 2019-08-15

## 2019-08-14 RX ORDER — PANTOPRAZOLE SODIUM 40 MG/1
40 TABLET, DELAYED RELEASE ORAL
Status: DISCONTINUED | OUTPATIENT
Start: 2019-08-15 | End: 2019-08-15

## 2019-08-14 NOTE — ANESTHESIA POSTPROCEDURE EVALUATION
[facility] Patient Status:  Observation   Age/Gender 80year old female MRN [id number]   Location [facility] Attending [facility] Sim , Dai Gabriel [address] Day # 0 PCP Felipe Mccollum MD       Anesthesia Post-op Note

## 2019-08-14 NOTE — PLAN OF CARE
Patient alert and oriented; VSS; cardiac monitor showing SR; lung sounds clear, on room air while awake and 2 liters via nasal cannula as patient does at home, no cough noted; no edema; continent of bowel and bladder, last bowel movement yesterday; patient

## 2019-08-14 NOTE — CONSULTS
[facility]    Report of Consultation    Ethlyn Hodgkin Mathauser Patient Status:  Observation    1930 MRN [id number]   [facility] 2NE-A Attending Abdiaziz Parrish,    Hosp Day # 0 PCP Chasidy Collado MD     Date of Admission: Father         MS   • Heart Disorder Brother 78        Myocardial infarction      reports that she quit smoking about 15 years ago. She started smoking about 67 years ago. She has a 3.60 pack-year smoking history.  She has never used smokeless tobacco. She convulsions/seizures, dementia.   Cardiovascular: Denies history of heart murmur, leg swelling, history of rheumatic fever, pacemaker, chest pain or pressure after eating or when upset, automatic defibrillator, angina, other implanted devices, irregular hea enlarged. No lymphadenopathy. CV: S1 and S2 normal.  No murmurs or gallops. Lungs: Clear to auscultation. Abdomen: Soft and nondistended. Nontender. No masses. Bowel sounds are present. Back: No CVA tenderness.   Extremities: No edema, cyanosis, or

## 2019-08-14 NOTE — ED PROVIDER NOTES
Patient Seen in: [facility] Emergency Department    History   Patient presents with:  Chest Pain Angina (cardiovascular)    Stated Complaint: chest pain    HPI    77-year-old female with a history of hypertension, history of paroxysmal atrial fibril 1/5/1952        Quit date: 1/5/2004        Years since quitting: 15.6      Smokeless tobacco: Never Used    Alcohol use: Yes      Alcohol/week: 1.0 standard drinks      Types: 1 Glasses of wine per week      Comment: rare    Drug use:  No             Review A/G Ratio 0.9 (*)     All other components within normal limits   PTT, ACTIVATED - Abnormal; Notable for the following components:    PTT 25.2 (*)     All other components within normal limits   CBC W/ DIFFERENTIAL - Abnormal; Notable for the following gallbladder wall thickening or surrounding fluid. Intrahepatic bile ducts are dilated. Remaining liver is homogenous. Pancreas and spleen are unremarkable. Air-fluid levels are seen in loops of small bowel and right colon with suggesting ileus.   No otto hyperlipidemia who presents to the emergency department for complaints of initial chest pain. During the course of her evaluation she also complained of back pain and abdominal pain. CT of the chest was negative for acute finding.   Serial troponins have

## 2019-08-14 NOTE — ANESTHESIA PREPROCEDURE EVALUATION
PRE-OP EVALUATION    Patient Name: Soham Cortes    Pre-op Diagnosis: CBD STONE    Procedure(s):  ENDOSCOPIC RETROGRADE CHOLANGIOPANCREATOGRAPHY    Surgeon(s) and Role:     * Krissy Cooper MD - Primary    Pre-op vitals reviewed.   Temp: 98.1 °F daily. Disp:  Rfl:    potassium chloride 20 MEQ Oral Powd Pack Take 20 mEq by mouth daily. Disp:  Rfl:    AmLODIPine Besylate 5 MG Oral Tab Take 5 mg by mouth daily. Disp:  Rfl:    Losartan Potassium 50 MG Oral Tab Take 50 mg by mouth daily.    Disp:  Rfl: MCV 82.6 08/14/2019    MCH 26.7 08/14/2019    MCHC 32.3 08/14/2019    RDW 14.0 08/14/2019    .0 08/14/2019     Lab Results   Component Value Date     08/14/2019     08/06/2019    K 3.8 08/14/2019    K 4.30 08/06/2019     08/14/2019

## 2019-08-14 NOTE — H&P
[facility] Hospitalist History and Physical      CC: Abd pain    PCP: Rosana Rubio MD    History of Present Illness: Patient is a 80year old female with PMH sig for pulm fibrosis, hx of C.diff, Afib, HTN, GERD here with abd pain.  She initially described as Disp:  Rfl:    potassium chloride 20 MEQ Oral Powd Pack Take 20 mEq by mouth daily. Disp:  Rfl:    AmLODIPine Besylate 5 MG Oral Tab Take 5 mg by mouth daily. Disp:  Rfl:    Losartan Potassium 50 MG Oral Tab Take 50 mg by mouth daily.    Disp:  Rfl:    Sumi Review:    Labs:   Lab Results   Component Value Date    WBC 17.3 08/14/2019    HGB 12.9 08/14/2019    HCT 40.0 08/14/2019    .0 08/14/2019    CREATSERUM 1.01 08/14/2019    BUN 14 08/14/2019     08/14/2019    K 3.8 08/14/2019     08/14/2 Inpatient Hospitalization - Initial Certification    Patient will require inpatient services that will reasonably be expected to span two midnight's based on the clinical documentation in H+P.    Based on patients current state of illness, I anticipate that

## 2019-08-14 NOTE — ED INITIAL ASSESSMENT (HPI)
Pt arrived via EMS for c/o chest pain in the middle of her chest that radiates down her abdomen and into her back. Pt given nitro x1 in field. Pt states chest pain feels better but back and abdomen still hurt. Pt states nausea but unable to throw up.

## 2019-08-14 NOTE — H&P (VIEW-ONLY)
[facility]  Report of Consultation    Iris Cortes Patient Status:  Observation    1930 MRN [id number]   [facility] 2NE-A Attending Roman Kyle DO   Hosp Day # 0 PCP Beacher Lombard, MD     19    Reason for Co Procedure Laterality Date   • APPENDECTOMY     • CATARACT     • COLONOSCOPY  07/06/2004    Diverticulosis, no polyps   • COLONOSCOPY     • EYE SURGERY     • FRACTURE SURGERY     • HYSTERECTOMY  1970    Oophorectomy, for endometrial CA   • OTHER  HIP INJU Take 20 mEq by mouth daily. Disp:  Rfl:    AmLODIPine Besylate 5 MG Oral Tab Take 5 mg by mouth daily. Disp:  Rfl:    Losartan Potassium 50 MG Oral Tab Take 50 mg by mouth daily.    Disp:  Rfl:    Metoprolol Succinate ER 25 MG Oral Tablet 24 Hr Take 25 mg b is noted in the subcutaneous compartment, with enhanced through transmission but no internal vascularity on color Doppler imaging. A discrete hypoechoic rim/capsule is suggested. A few punctate internal echogenic reflectors are noted.  These findings are no across right and left lower quadrants   CONTRAST USED:  100cc of Omnipaque 350  FINDINGS:   CHEST:  LUNGS:  Mild diffuse emphysematous changes with an upper lobe predominance and stable bilateral pleural parenchymal scarring including right apical partiall rami fractures. Postsurgical changes with hardware involve the right hip. Levoscoliosis at the thoracolumbar junction. CONCLUSION:  1. No CT evidence for acute pulmonary embolism. 2. Cholelithiasis. Choledocholithiasis.   Intra and extrahepatic bili

## 2019-08-14 NOTE — CONSULTS
[facility]  Report of Consultation    Buddy Cortes Patient Status:  Observation    1930 MRN [id number]   [facility] 2NE-A Attending Daphne Alexandra DO   Hosp Day # 0 PCP Lord Geovanna MD     19    Reason for Co Procedure Laterality Date   • APPENDECTOMY     • CATARACT     • COLONOSCOPY  07/06/2004    Diverticulosis, no polyps   • COLONOSCOPY     • EYE SURGERY     • FRACTURE SURGERY     • HYSTERECTOMY  1970    Oophorectomy, for endometrial CA   • OTHER  HIP INJU Take 20 mEq by mouth daily. Disp:  Rfl:    AmLODIPine Besylate 5 MG Oral Tab Take 5 mg by mouth daily. Disp:  Rfl:    Losartan Potassium 50 MG Oral Tab Take 50 mg by mouth daily.    Disp:  Rfl:    Metoprolol Succinate ER 25 MG Oral Tablet 24 Hr Take 25 mg b is noted in the subcutaneous compartment, with enhanced through transmission but no internal vascularity on color Doppler imaging. A discrete hypoechoic rim/capsule is suggested. A few punctate internal echogenic reflectors are noted.  These findings are no across right and left lower quadrants   CONTRAST USED:  100cc of Omnipaque 350  FINDINGS:   CHEST:  LUNGS:  Mild diffuse emphysematous changes with an upper lobe predominance and stable bilateral pleural parenchymal scarring including right apical partiall rami fractures. Postsurgical changes with hardware involve the right hip. Levoscoliosis at the thoracolumbar junction. CONCLUSION:  1. No CT evidence for acute pulmonary embolism. 2. Cholelithiasis. Choledocholithiasis.   Intra and extrahepatic bili

## 2019-08-14 NOTE — OPERATIVE REPORT
[address] REPORT   PATIENT NAME: Luanne Gray  MRN: [id number]  DATE OF OPERATION: 8/14/2019  PREOPERATIVE DIAGNOSIS: abdominal pain, history of choledocholithiasis s/p ERCP/sphincterotomy/ stone extraction; CT scan c/w recurrent chol pancreatic duct. Cystic duct filled with contrast.  It's insertion was in the distal CBD. Scope was withdrawn from the patient and patient tolerated the procedure well. FINDINGS   1.  Removal of multiple large stones from CBD  RECOMMENDATIONS: consider ch

## 2019-08-14 NOTE — CONSULTS
Pulmonary / Critical Care H&P/Consult       NAME: Adán Cortes - ROOM: [facility]1797-U - MRN: [id number] - Age: 80year old - :  1930    Date of Admission: 2019  1:17 AM  Admission Diagnosis: Choledocholithiasis [K80.50]  LBBB (left bundle b • COLONOSCOPY     • EYE SURGERY     • FRACTURE SURGERY     • HYSTERECTOMY  1970    Oophorectomy, for endometrial CA   • OTHER  HIP INJURY    orif Right hip    • UPPER GI ENDOSCOPY,EXAM          Allergies:    Doxycycline             NAUSEA AND VOMITING  C History Narrative      Not on file         Family History:  Family History   Problem Relation Age of Onset   • Other (Other) Father         MS   • Heart Disorder Brother 78        Myocardial infarction        Home Medications:    Outpatient Medications Mar data filed at 8/14/2019 1109  Gross per 24 hour   Intake 100 ml   Output 500 ml   Net -400 ml       /62 (BP Location: Left arm)   Pulse 83   Temp 98.1 °F (36.7 °C) (Oral)   Resp 18   Ht 5' 3\" (1.6 m)   Wt 143 lb 11.8 oz (65.2 kg)   SpO2 92%   BMI 25 This is not an acute PE.   - does not require chronic anticoagulation. 2. Emphysema: no exac  - O2 as needed. - not on chronic inhaler therapy  - bd prn  3. abd pain, cholelithiasis: may need ercp  - per gi / surgery  4.  Preop: Given that this patient

## 2019-08-15 VITALS
RESPIRATION RATE: 18 BRPM | HEIGHT: 63 IN | TEMPERATURE: 98 F | HEART RATE: 62 BPM | OXYGEN SATURATION: 95 % | WEIGHT: 143.75 LBS | BODY MASS INDEX: 25.47 KG/M2 | DIASTOLIC BLOOD PRESSURE: 66 MMHG | SYSTOLIC BLOOD PRESSURE: 119 MMHG

## 2019-08-15 LAB
BASOPHILS # BLD AUTO: 0.05 X10(3) UL (ref 0–0.2)
BASOPHILS NFR BLD AUTO: 0.4 %
DEPRECATED RDW RBC AUTO: 39.9 FL (ref 35.1–46.3)
EOSINOPHIL # BLD AUTO: 0.09 X10(3) UL (ref 0–0.7)
EOSINOPHIL NFR BLD AUTO: 0.8 %
ERYTHROCYTE [DISTWIDTH] IN BLOOD BY AUTOMATED COUNT: 13.3 % (ref 11–15)
HCT VFR BLD AUTO: 35.8 % (ref 35–48)
HGB BLD-MCNC: 11.7 G/DL (ref 12–16)
IMM GRANULOCYTES # BLD AUTO: 0.04 X10(3) UL (ref 0–1)
IMM GRANULOCYTES NFR BLD: 0.3 %
LYMPHOCYTES # BLD AUTO: 0.98 X10(3) UL (ref 1–4)
LYMPHOCYTES NFR BLD AUTO: 8.2 %
MCH RBC QN AUTO: 26.8 PG (ref 26–34)
MCHC RBC AUTO-ENTMCNC: 32.7 G/DL (ref 31–37)
MCV RBC AUTO: 82.1 FL (ref 80–100)
MONOCYTES # BLD AUTO: 1.18 X10(3) UL (ref 0.1–1)
MONOCYTES NFR BLD AUTO: 9.9 %
NEUTROPHILS # BLD AUTO: 9.61 X10 (3) UL (ref 1.5–7.7)
NEUTROPHILS # BLD AUTO: 9.61 X10(3) UL (ref 1.5–7.7)
NEUTROPHILS NFR BLD AUTO: 80.4 %
PLATELET # BLD AUTO: 221 10(3)UL (ref 150–450)
RBC # BLD AUTO: 4.36 X10(6)UL (ref 3.8–5.3)
WBC # BLD AUTO: 12 X10(3) UL (ref 4–11)

## 2019-08-15 PROCEDURE — 85025 COMPLETE CBC W/AUTO DIFF WBC: CPT | Performed by: INTERNAL MEDICINE

## 2019-08-15 NOTE — PROGRESS NOTES
[facility]    Progress Note    Boogie Cortes Patient Status:  Inpatient    1930 MRN [id number]   [facility] 2NE-A Attending Jayce Gonzalez,    Hosp Day # 1 PCP Kathy Amin MD     Subjective:  Olga Rothman

## 2019-08-15 NOTE — PROGRESS NOTES
Pt discharged. IV removed with catheter intact. Tele d/c'd. Pt received discharge instructions and follow-up information. Prescriptions reviewed and sent to pharmacy of choice. All questions answered and education given with daughter at bedside.  Pt escorte

## 2019-08-15 NOTE — PLAN OF CARE
Rcv'd drowsy   Able to awake with voice  Denies pain or discomfort  Denies swallowing issues or sore throat  On tele with SR L BBB  Lung sounds clear bilateral   noted  IV and po antibiotics  Fall risk  Bed alarm noted  Problem: CARDIOVASCULAR - ADULT

## 2019-08-15 NOTE — PLAN OF CARE
Alert & oriented x4. NSR on tele. Pt denies chest pain & SOB. Denies abd pain; some discomfort with palpation as expected per surgery. Pt tolerated full liquid breakfast. To have soft for lunch & ok for d/c if tolerates.  Continent of bowel & bladder; had a

## 2019-08-15 NOTE — DISCHARGE SUMMARY
General Medicine Discharge Summary     Patient ID:  Yola Cortes  80year old  4/27/1930    Admit date: 8/14/2019    Discharge date and time:  8/15/19    Attending Physician: NIKKI Huerta WBC 17, started on pre-procedure abx- WBC down today, afebrile- abx stopped  - Surgery has seen- plan for outpatient FU for elective arturo     # Incidental report of chronic PE on CTA chest  - Discussed with Dr. Andres Ambriz- reviewed and unclear if this is a tolerated  Diet: regular diet  Wound Care: as directed  Code Status: Full Code      Exam on day of discharge:      08/15/19  0906   BP: 119/66   Pulse: 62   Resp: 18   Temp: 98.3 °F (36.8 °C)       General: no acute distress, alert and oriented x 3  Heart:

## 2019-08-15 NOTE — PROGRESS NOTES
[facility]  Progress Note    Alton Cortes Patient Status:  Inpatient    1930 MRN [id number]   [facility] 2NE-A Attending James Plata,    Hosp Day # 1 PCP Sarah Gonzalez MD     Subjective:    Patient underwent ER risks, and postop restrictions  Daughter and patient would like time to discuss recommendations  Ok for dc home per primary service  Will call surgery scheduler after further discussion re: jerilyn morris- also offered patient and daughter office visit.    All q

## 2019-08-20 RX ORDER — CEFAZOLIN SODIUM/WATER 2 G/20 ML
2 SYRINGE (ML) INTRAVENOUS ONCE
Status: CANCELLED | OUTPATIENT
Start: 2019-08-20 | End: 2019-08-20

## 2019-08-20 RX ORDER — ACETAMINOPHEN 500 MG
1000 TABLET ORAL ONCE
Status: CANCELLED | OUTPATIENT
Start: 2019-08-20 | End: 2019-08-20

## 2019-08-26 ENCOUNTER — HOSPITAL ENCOUNTER (INPATIENT)
Facility: HOSPITAL | Age: 84
LOS: 3 days | Discharge: HOME HEALTH | DRG: 409 | End: 2019-08-30
Attending: SURGERY | Admitting: SURGERY
Payer: MEDICARE

## 2019-08-26 DIAGNOSIS — D17.1 LIPOMA OF BUTTOCK: ICD-10-CM

## 2019-08-26 LAB
GLUCOSE BLD-MCNC: 213 MG/DL (ref 70–99)
GLUCOSE BLD-MCNC: 225 MG/DL (ref 70–99)
ISTAT BLOOD GAS BASE DEFICIT: -4 MMOL/L
ISTAT BLOOD GAS HCO3: 23.6 MEQ/L (ref 22–26)
ISTAT BLOOD GAS O2 SATURATION: 81 % (ref 60–85)
ISTAT BLOOD GAS PCO2: 54.8 MMHG (ref 38–50)
ISTAT BLOOD GAS PH: 7.24 (ref 7.32–7.43)
ISTAT BLOOD GAS PO2: <70 MMHG (ref 35–40)
ISTAT BLOOD GAS TCO2: 25 MMOL/L (ref 22–32)
ISTAT HEMATOCRIT: 34 % (ref 34–50)
ISTAT IONIZED CALCIUM: 1.23 MMOL/L (ref 1.12–1.32)
ISTAT POTASSIUM: 3.9 MMOL/L (ref 3.6–5.1)
ISTAT SODIUM: 138 MMOL/L (ref 136–145)

## 2019-08-26 PROCEDURE — 82330 ASSAY OF CALCIUM: CPT

## 2019-08-26 PROCEDURE — 88304 TISSUE EXAM BY PATHOLOGIST: CPT | Performed by: SURGERY

## 2019-08-26 PROCEDURE — 84295 ASSAY OF SERUM SODIUM: CPT

## 2019-08-26 PROCEDURE — 0FN44ZZ RELEASE GALLBLADDER, PERCUTANEOUS ENDOSCOPIC APPROACH: ICD-10-PCS | Performed by: SURGERY

## 2019-08-26 PROCEDURE — 0FT40ZZ RESECTION OF GALLBLADDER, OPEN APPROACH: ICD-10-PCS | Performed by: SURGERY

## 2019-08-26 PROCEDURE — S0028 INJECTION, FAMOTIDINE, 20 MG: HCPCS | Performed by: SURGERY

## 2019-08-26 PROCEDURE — 85014 HEMATOCRIT: CPT

## 2019-08-26 PROCEDURE — 82962 GLUCOSE BLOOD TEST: CPT

## 2019-08-26 PROCEDURE — 82803 BLOOD GASES ANY COMBINATION: CPT

## 2019-08-26 PROCEDURE — 0FQ90ZZ REPAIR COMMON BILE DUCT, OPEN APPROACH: ICD-10-PCS | Performed by: SURGERY

## 2019-08-26 PROCEDURE — 84132 ASSAY OF SERUM POTASSIUM: CPT

## 2019-08-26 PROCEDURE — 0FJ44ZZ INSPECTION OF GALLBLADDER, PERCUTANEOUS ENDOSCOPIC APPROACH: ICD-10-PCS | Performed by: SURGERY

## 2019-08-26 RX ORDER — INSULIN ASPART 100 [IU]/ML
INJECTION, SOLUTION INTRAVENOUS; SUBCUTANEOUS
Status: DISCONTINUED
Start: 2019-08-26 | End: 2019-08-26 | Stop reason: WASHOUT

## 2019-08-26 RX ORDER — ONDANSETRON 2 MG/ML
4 INJECTION INTRAMUSCULAR; INTRAVENOUS AS NEEDED
Status: DISCONTINUED | OUTPATIENT
Start: 2019-08-26 | End: 2019-08-26 | Stop reason: HOSPADM

## 2019-08-26 RX ORDER — HYDROCODONE BITARTRATE AND ACETAMINOPHEN 5; 325 MG/1; MG/1
1 TABLET ORAL AS NEEDED
Status: DISCONTINUED | OUTPATIENT
Start: 2019-08-26 | End: 2019-08-26 | Stop reason: HOSPADM

## 2019-08-26 RX ORDER — HYDROMORPHONE HYDROCHLORIDE 1 MG/ML
0.3 INJECTION, SOLUTION INTRAMUSCULAR; INTRAVENOUS; SUBCUTANEOUS EVERY 5 MIN PRN
Status: DISCONTINUED | OUTPATIENT
Start: 2019-08-26 | End: 2019-08-26 | Stop reason: HOSPADM

## 2019-08-26 RX ORDER — DIPHENHYDRAMINE HCL 25 MG
25 CAPSULE ORAL NIGHTLY PRN
Status: DISCONTINUED | OUTPATIENT
Start: 2019-08-26 | End: 2019-08-30

## 2019-08-26 RX ORDER — KETOROLAC TROMETHAMINE 30 MG/ML
30 INJECTION, SOLUTION INTRAMUSCULAR; INTRAVENOUS EVERY 6 HOURS PRN
Status: DISCONTINUED | OUTPATIENT
Start: 2019-08-26 | End: 2019-08-27

## 2019-08-26 RX ORDER — CEFOXITIN 2 G/1
INJECTION, POWDER, FOR SOLUTION INTRAVENOUS
Status: DISCONTINUED | OUTPATIENT
Start: 2019-08-26 | End: 2019-08-26 | Stop reason: HOSPADM

## 2019-08-26 RX ORDER — HYDROCODONE BITARTRATE AND ACETAMINOPHEN 5; 325 MG/1; MG/1
2 TABLET ORAL EVERY 4 HOURS PRN
Status: DISCONTINUED | OUTPATIENT
Start: 2019-08-26 | End: 2019-08-27 | Stop reason: ALTCHOICE

## 2019-08-26 RX ORDER — HEPARIN SODIUM 5000 [USP'U]/ML
5000 INJECTION, SOLUTION INTRAVENOUS; SUBCUTANEOUS EVERY 12 HOURS SCHEDULED
Status: DISCONTINUED | OUTPATIENT
Start: 2019-08-26 | End: 2019-08-30

## 2019-08-26 RX ORDER — DIPHENHYDRAMINE HYDROCHLORIDE 50 MG/ML
12.5 INJECTION INTRAMUSCULAR; INTRAVENOUS EVERY 4 HOURS PRN
Status: DISCONTINUED | OUTPATIENT
Start: 2019-08-26 | End: 2019-08-30

## 2019-08-26 RX ORDER — ONDANSETRON 2 MG/ML
4 INJECTION INTRAMUSCULAR; INTRAVENOUS EVERY 6 HOURS PRN
Status: DISCONTINUED | OUTPATIENT
Start: 2019-08-26 | End: 2019-08-30

## 2019-08-26 RX ORDER — BUPIVACAINE HYDROCHLORIDE AND EPINEPHRINE 5; 5 MG/ML; UG/ML
INJECTION, SOLUTION EPIDURAL; INTRACAUDAL; PERINEURAL AS NEEDED
Status: DISCONTINUED | OUTPATIENT
Start: 2019-08-26 | End: 2019-08-26 | Stop reason: HOSPADM

## 2019-08-26 RX ORDER — SODIUM CHLORIDE, SODIUM LACTATE, POTASSIUM CHLORIDE, CALCIUM CHLORIDE 600; 310; 30; 20 MG/100ML; MG/100ML; MG/100ML; MG/100ML
INJECTION, SOLUTION INTRAVENOUS CONTINUOUS
Status: DISCONTINUED | OUTPATIENT
Start: 2019-08-26 | End: 2019-08-26 | Stop reason: HOSPADM

## 2019-08-26 RX ORDER — ONDANSETRON 2 MG/ML
4 INJECTION INTRAMUSCULAR; INTRAVENOUS EVERY 6 HOURS PRN
Status: DISCONTINUED | OUTPATIENT
Start: 2019-08-26 | End: 2019-08-26

## 2019-08-26 RX ORDER — HYDROMORPHONE HYDROCHLORIDE 1 MG/ML
INJECTION, SOLUTION INTRAMUSCULAR; INTRAVENOUS; SUBCUTANEOUS
Status: COMPLETED
Start: 2019-08-26 | End: 2019-08-26

## 2019-08-26 RX ORDER — SODIUM CHLORIDE, SODIUM LACTATE, POTASSIUM CHLORIDE, CALCIUM CHLORIDE 600; 310; 30; 20 MG/100ML; MG/100ML; MG/100ML; MG/100ML
INJECTION, SOLUTION INTRAVENOUS CONTINUOUS
Status: DISCONTINUED | OUTPATIENT
Start: 2019-08-26 | End: 2019-08-30

## 2019-08-26 RX ORDER — HYDROCODONE BITARTRATE AND ACETAMINOPHEN 5; 325 MG/1; MG/1
2 TABLET ORAL AS NEEDED
Status: DISCONTINUED | OUTPATIENT
Start: 2019-08-26 | End: 2019-08-26 | Stop reason: HOSPADM

## 2019-08-26 RX ORDER — NALOXONE HYDROCHLORIDE 0.4 MG/ML
0.08 INJECTION, SOLUTION INTRAMUSCULAR; INTRAVENOUS; SUBCUTANEOUS
Status: DISCONTINUED | OUTPATIENT
Start: 2019-08-26 | End: 2019-08-30

## 2019-08-26 RX ORDER — HYDROCODONE BITARTRATE AND ACETAMINOPHEN 5; 325 MG/1; MG/1
1 TABLET ORAL EVERY 4 HOURS PRN
Status: DISCONTINUED | OUTPATIENT
Start: 2019-08-26 | End: 2019-08-27 | Stop reason: ALTCHOICE

## 2019-08-26 RX ORDER — NALBUPHINE HCL 10 MG/ML
2.5 AMPUL (ML) INJECTION EVERY 4 HOURS PRN
Status: DISCONTINUED | OUTPATIENT
Start: 2019-08-26 | End: 2019-08-30

## 2019-08-26 RX ORDER — FAMOTIDINE 10 MG/ML
20 INJECTION, SOLUTION INTRAVENOUS 2 TIMES DAILY
Status: DISCONTINUED | OUTPATIENT
Start: 2019-08-26 | End: 2019-08-29

## 2019-08-26 RX ORDER — DEXTROSE, SODIUM CHLORIDE, AND POTASSIUM CHLORIDE 5; .45; .15 G/100ML; G/100ML; G/100ML
INJECTION INTRAVENOUS CONTINUOUS
Status: DISCONTINUED | OUTPATIENT
Start: 2019-08-26 | End: 2019-08-30

## 2019-08-26 RX ORDER — FAMOTIDINE 20 MG/1
20 TABLET ORAL 2 TIMES DAILY
Status: DISCONTINUED | OUTPATIENT
Start: 2019-08-26 | End: 2019-08-29

## 2019-08-26 RX ORDER — METOCLOPRAMIDE HYDROCHLORIDE 5 MG/ML
10 INJECTION INTRAMUSCULAR; INTRAVENOUS EVERY 6 HOURS PRN
Status: DISCONTINUED | OUTPATIENT
Start: 2019-08-26 | End: 2019-08-30

## 2019-08-26 RX ORDER — ACETAMINOPHEN 500 MG
1000 TABLET ORAL ONCE
Status: ON HOLD | COMMUNITY
End: 2019-08-30

## 2019-08-26 RX ORDER — METOPROLOL TARTRATE 5 MG/5ML
5 INJECTION INTRAVENOUS EVERY 6 HOURS PRN
Status: DISCONTINUED | OUTPATIENT
Start: 2019-08-26 | End: 2019-08-27

## 2019-08-26 RX ORDER — NALOXONE HYDROCHLORIDE 0.4 MG/ML
80 INJECTION, SOLUTION INTRAMUSCULAR; INTRAVENOUS; SUBCUTANEOUS AS NEEDED
Status: DISCONTINUED | OUTPATIENT
Start: 2019-08-26 | End: 2019-08-26 | Stop reason: HOSPADM

## 2019-08-26 RX ORDER — DEXTROSE, SODIUM CHLORIDE, AND POTASSIUM CHLORIDE 5; .45; .15 G/100ML; G/100ML; G/100ML
INJECTION INTRAVENOUS
Status: COMPLETED
Start: 2019-08-26 | End: 2019-08-26

## 2019-08-26 NOTE — PLAN OF CARE
Problem: PAIN - ADULT  Goal: Verbalizes/displays adequate comfort level or patient's stated pain goal  Description  INTERVENTIONS:  - Encourage pt to monitor pain and request assistance  - Assess pain using appropriate pain scale  - Administer analges appropriate  - Identify discharge learning needs (meds, wound care, etc)  - Arrange for interpreters to assist at discharge as needed  - Consider post-discharge preferences of patient/family/discharge partner  - Complete POLST form as appropriate  - Assess Patient admitted via bed. VSS. Afebrile. Breathing easily on 3L NC O2. Sats 97%. Oriented to room. Safety precautions initiated. Bed in low position. Call light in reach.    SCD's applied, writing RN explained the benefits of wearing SCD's to the pat

## 2019-08-26 NOTE — BRIEF OP NOTE
Pre-Operative Diagnosis: cholelithiasis, choledocholithiasis     Post-Operative Diagnosis: cholelithiasis, choledocholithiasis      Procedure Performed:   Procedure(s):   Attempted laparoscopic Cholecystectomy, Lysis of adhesions converted to open cholecyst

## 2019-08-26 NOTE — INTERVAL H&P NOTE
Pre-op Diagnosis: Lipoma of buttock [D17.1]    The above referenced H&P was reviewed by Tonja Young MD on 8/26/2019, the patient was examined and no significant changes have occurred in the patient's condition since the H&P was performed.   I discussed with

## 2019-08-27 PROBLEM — D17.1 LIPOMA OF BUTTOCK: Status: ACTIVE | Noted: 2019-08-27

## 2019-08-27 LAB
ALBUMIN SERPL-MCNC: 2.4 G/DL (ref 3.4–5)
ALBUMIN/GLOB SERPL: 0.7 {RATIO} (ref 1–2)
ALP LIVER SERPL-CCNC: 58 U/L (ref 55–142)
ALT SERPL-CCNC: 73 U/L (ref 13–56)
ANION GAP SERPL CALC-SCNC: 6 MMOL/L (ref 0–18)
AST SERPL-CCNC: 80 U/L (ref 15–37)
BILIRUB SERPL-MCNC: 0.5 MG/DL (ref 0.1–2)
BUN BLD-MCNC: 13 MG/DL (ref 7–18)
BUN/CREAT SERPL: 14.6 (ref 10–20)
CALCIUM BLD-MCNC: 7.6 MG/DL (ref 8.5–10.1)
CHLORIDE SERPL-SCNC: 108 MMOL/L (ref 98–112)
CO2 SERPL-SCNC: 25 MMOL/L (ref 21–32)
CREAT BLD-MCNC: 0.89 MG/DL (ref 0.55–1.02)
DEPRECATED RDW RBC AUTO: 41.1 FL (ref 35.1–46.3)
ERYTHROCYTE [DISTWIDTH] IN BLOOD BY AUTOMATED COUNT: 13.2 % (ref 11–15)
GLOBULIN PLAS-MCNC: 3.3 G/DL (ref 2.8–4.4)
GLUCOSE BLD-MCNC: 156 MG/DL (ref 70–99)
HCT VFR BLD AUTO: 31.2 % (ref 35–48)
HGB BLD-MCNC: 9.7 G/DL (ref 12–16)
M PROTEIN MFR SERPL ELPH: 5.7 G/DL (ref 6.4–8.2)
MCH RBC QN AUTO: 26.5 PG (ref 26–34)
MCHC RBC AUTO-ENTMCNC: 31.1 G/DL (ref 31–37)
MCV RBC AUTO: 85.2 FL (ref 80–100)
OSMOLALITY SERPL CALC.SUM OF ELEC: 291 MOSM/KG (ref 275–295)
PLATELET # BLD AUTO: 279 10(3)UL (ref 150–450)
POTASSIUM SERPL-SCNC: 4.7 MMOL/L (ref 3.5–5.1)
RBC # BLD AUTO: 3.66 X10(6)UL (ref 3.8–5.3)
SODIUM SERPL-SCNC: 139 MMOL/L (ref 136–145)
WBC # BLD AUTO: 24 X10(3) UL (ref 4–11)

## 2019-08-27 PROCEDURE — 80053 COMPREHEN METABOLIC PANEL: CPT | Performed by: SURGERY

## 2019-08-27 PROCEDURE — S0028 INJECTION, FAMOTIDINE, 20 MG: HCPCS | Performed by: SURGERY

## 2019-08-27 PROCEDURE — 85027 COMPLETE CBC AUTOMATED: CPT | Performed by: SURGERY

## 2019-08-27 RX ORDER — METOPROLOL TARTRATE 5 MG/5ML
5 INJECTION INTRAVENOUS EVERY 6 HOURS
Status: DISCONTINUED | OUTPATIENT
Start: 2019-08-27 | End: 2019-08-29

## 2019-08-27 RX ORDER — MORPHINE SULFATE 4 MG/ML
4 INJECTION, SOLUTION INTRAMUSCULAR; INTRAVENOUS EVERY 2 HOUR PRN
Status: DISCONTINUED | OUTPATIENT
Start: 2019-08-27 | End: 2019-08-30

## 2019-08-27 RX ORDER — MORPHINE SULFATE 4 MG/ML
2 INJECTION, SOLUTION INTRAMUSCULAR; INTRAVENOUS EVERY 2 HOUR PRN
Status: DISCONTINUED | OUTPATIENT
Start: 2019-08-27 | End: 2019-08-30

## 2019-08-27 RX ORDER — ACETAMINOPHEN 10 MG/ML
1000 INJECTION, SOLUTION INTRAVENOUS EVERY 6 HOURS
Status: DISCONTINUED | OUTPATIENT
Start: 2019-08-27 | End: 2019-08-30

## 2019-08-27 RX ORDER — KETOROLAC TROMETHAMINE 15 MG/ML
15 INJECTION, SOLUTION INTRAMUSCULAR; INTRAVENOUS EVERY 6 HOURS PRN
Status: ACTIVE | OUTPATIENT
Start: 2019-08-27 | End: 2019-08-28

## 2019-08-27 NOTE — PROGRESS NOTES
[facility]  Progress Note    Candace Cortes Patient Status:  Inpatient    1930 MRN [id number]   [facility] 3NW-A Attending Chely Nino MD   [facility] Day # 1 PCP Rigoberto Covarrubias MD     Subjective:    Patient sitting in chair th block)     Benign essential HTN     Choledocholithiasis      Impression:     81 y/o S/P: Diagnostic laparoscopy with laparoscopic lysis of adhesions, attempted laparoscopic cholecystectomy with open conversion with cholecystectomy and repair of common bile

## 2019-08-27 NOTE — OPERATIVE REPORT
[facility]    PATIENT'S NAME: Bharat Scales   ATTENDING PHYSICIAN: Uche Membreno M.D. OPERATING PHYSICIAN: Uche Membreno M.D.    PATIENT ACCOUNT#:   [de-identified]    LOCATION:  [address] [place] [address]  MEDICAL RECORD #:   [id number]       DATE OF BIRTH:  [birth year] to dissect all of these structures off the gallbladder. This was successful. However, the anatomy continued to be very altered and difficult to determine. The cystic duct was unable to be identified.   Common duct was unable to be identified and the tria bile duct. Once the cystic duct was controlled, the small rent in the common hepatic duct was repaired with interrupted 4-0 Vicryl sutures. Once this was accomplished, a drain was placed in the subhepatic space.   After thorough irrigation, Surgicel was p

## 2019-08-27 NOTE — CONSULTS
[facility] Pharmacy Note:  Pain Consult    Myrna Johnson is a 80year old female started on Dilaudid PCA by Dr. Spencer Hammans. Pharmacy was consulted to review medication profile and to discontinue previously ordered narcotics and sedatives.     Medication profile

## 2019-08-27 NOTE — CONSULTS
General Medicine Consult      Consulted by: Rocael Serna MD    PCP: Esdras Barnes MD    Reason for Consultation: Medical Management    History of Present Illness: Patient is a 80year old female with PMH including but not limited to afib, COPD, GERD, H ENDOSCOPY,EXAM          HOME MEDS    Heparin Sodium (Porcine) 5,000 Units Subcutaneous 2 times per day   famoTIDine 20 mg Oral BID   Or      famoTIDine 20 mg Intravenous BID   piperacillin-tazobactam 3.375 g Intravenous Q8H   [START ON 8/27/2019] herman (EWC=[id number])    Result Date: 8/7/2019  DATE OF SERVICE: 08.06.2019 US TRUNK (FIL=[id number]) CLINICAL INDICATION: Benign lipomatous neoplasm of skin and subcutaneous tissue of trunk. COMPARISON STUDY: None available.  TECHNIQUE: Directed sonography of the left bu FINDINGS: Cardiac silhouette and pulmonary vasculature are unremarkable. No consolidation, pleural effusion or pneumothorax. IMPRESSION: Unremarkable portable chest radiograph. Agree with preliminary radiology report from [address] [place] [address] radiology.    Dictated by There is stable diffuse narrowing of the right upper lobe pulmonary artery and branches consistent with chronic PE. ABDOMEN/PELVIS: LIVER:  No enlargement, atrophy, abnormal density, or significant focal lesion. BILIARY:  Cholelithiasis.   Common duct dis surgery by Dr. Albert Clement. # cholelithiasis, choledocholithiasis  -S/P Attempted laparoscopic Cholecystectomy, Lysis of adhesions converted to open cholecystectomy  -cont plan per surgery  -increase activity as tolerated   -IS 10x/hr for atelectasis;  Instruc

## 2019-08-27 NOTE — PROGRESS NOTES
[facility] Hospitalist Progress Note     PCP: Pascual Salgado MD    Chief Complaint: follow-up    Overnight/Interim Events:      SUBJECTIVE:  Pt sitting in chair. NGT in place, clamped. No n/v. Using IS. -140. On 2L.      OBJECTIVE:  Temp:  [97.9 °F (36 sulfate, ketorolac (TORADOL) injection, diphenhydrAMINE, ondansetron HCl, Metoclopramide HCl, HYDROmorphone (DILAUDID) PCA bolus from bag, Naloxone HCl, Nalbuphine HCl, diphenhydrAMINE HCl, metoprolol Tartrate       Assessment/Plan:     80year old female

## 2019-08-27 NOTE — PROGRESS NOTES
[facility] Pharmacy Note:  Acetaminophen Therapeutic Duplication      Candace Cortes is a(n) 80year old female who has been prescribed both IV acetaminophen (Ofirmev) and Tiffany Canter was discontinued per P&T approved protocol for duplicate therapy of IV a

## 2019-08-27 NOTE — PLAN OF CARE
Problem: PAIN - ADULT  Goal: Verbalizes/displays adequate comfort level or patient's stated pain goal  Description  INTERVENTIONS:  - Encourage pt to monitor pain and request assistance  - Assess pain using appropriate pain scale  - Administer analgesics appropriate  - Identify discharge learning needs (meds, wound care, etc)  - Arrange for interpreters to assist at discharge as needed  - Consider post-discharge preferences of patient/family/discharge partner  - Complete POLST form as appropriate  - Assess Problem: SKIN/TISSUE INTEGRITY - ADULT  Goal: Skin integrity remains intact  Description  INTERVENTIONS  - Assess and document risk factors for pressure ulcer development  - Assess and document skin integrity  - Monitor for areas of redness and/or skin b

## 2019-08-28 ENCOUNTER — APPOINTMENT (OUTPATIENT)
Dept: GENERAL RADIOLOGY | Facility: HOSPITAL | Age: 84
DRG: 409 | End: 2019-08-28
Attending: INTERNAL MEDICINE
Payer: MEDICARE

## 2019-08-28 ENCOUNTER — ANESTHESIA (OUTPATIENT)
Dept: ENDOSCOPY | Facility: HOSPITAL | Age: 84
DRG: 409 | End: 2019-08-28
Payer: MEDICARE

## 2019-08-28 ENCOUNTER — ANESTHESIA EVENT (OUTPATIENT)
Dept: ENDOSCOPY | Facility: HOSPITAL | Age: 84
DRG: 409 | End: 2019-08-28
Payer: MEDICARE

## 2019-08-28 LAB
ALBUMIN SERPL-MCNC: 2.5 G/DL (ref 3.4–5)
ALP LIVER SERPL-CCNC: 61 U/L (ref 55–142)
ALT SERPL-CCNC: 49 U/L (ref 13–56)
ANION GAP SERPL CALC-SCNC: 6 MMOL/L (ref 0–18)
AST SERPL-CCNC: 36 U/L (ref 15–37)
BILIRUB DIRECT SERPL-MCNC: <0.1 MG/DL (ref 0–0.2)
BILIRUB SERPL-MCNC: 0.3 MG/DL (ref 0.1–2)
BUN BLD-MCNC: 9 MG/DL (ref 7–18)
BUN/CREAT SERPL: 9.3 (ref 10–20)
CALCIUM BLD-MCNC: 8.2 MG/DL (ref 8.5–10.1)
CHLORIDE SERPL-SCNC: 113 MMOL/L (ref 98–112)
CO2 SERPL-SCNC: 23 MMOL/L (ref 21–32)
CREAT BLD-MCNC: 0.97 MG/DL (ref 0.55–1.02)
DEPRECATED RDW RBC AUTO: 41.2 FL (ref 35.1–46.3)
ERYTHROCYTE [DISTWIDTH] IN BLOOD BY AUTOMATED COUNT: 13.2 % (ref 11–15)
GLUCOSE BLD-MCNC: 85 MG/DL (ref 70–99)
HAV IGM SER QL: 1.8 MG/DL (ref 1.6–2.6)
HCT VFR BLD AUTO: 27.6 % (ref 35–48)
HGB BLD-MCNC: 8.3 G/DL (ref 12–16)
M PROTEIN MFR SERPL ELPH: 5.9 G/DL (ref 6.4–8.2)
MCH RBC QN AUTO: 25.9 PG (ref 26–34)
MCHC RBC AUTO-ENTMCNC: 30.1 G/DL (ref 31–37)
MCV RBC AUTO: 86 FL (ref 80–100)
OSMOLALITY SERPL CALC.SUM OF ELEC: 292 MOSM/KG (ref 275–295)
PLATELET # BLD AUTO: 207 10(3)UL (ref 150–450)
POTASSIUM SERPL-SCNC: 4.3 MMOL/L (ref 3.5–5.1)
RBC # BLD AUTO: 3.21 X10(6)UL (ref 3.8–5.3)
SODIUM SERPL-SCNC: 142 MMOL/L (ref 136–145)
WBC # BLD AUTO: 13.3 X10(3) UL (ref 4–11)

## 2019-08-28 PROCEDURE — 83735 ASSAY OF MAGNESIUM: CPT | Performed by: INTERNAL MEDICINE

## 2019-08-28 PROCEDURE — 74328 X-RAY BILE DUCT ENDOSCOPY: CPT | Performed by: INTERNAL MEDICINE

## 2019-08-28 PROCEDURE — 85027 COMPLETE CBC AUTOMATED: CPT | Performed by: PHYSICIAN ASSISTANT

## 2019-08-28 PROCEDURE — 80048 BASIC METABOLIC PNL TOTAL CA: CPT | Performed by: INTERNAL MEDICINE

## 2019-08-28 PROCEDURE — 0FC98ZZ EXTIRPATION OF MATTER FROM COMMON BILE DUCT, VIA NATURAL OR ARTIFICIAL OPENING ENDOSCOPIC: ICD-10-PCS | Performed by: INTERNAL MEDICINE

## 2019-08-28 PROCEDURE — BF101ZZ FLUOROSCOPY OF BILE DUCTS USING LOW OSMOLAR CONTRAST: ICD-10-PCS | Performed by: INTERNAL MEDICINE

## 2019-08-28 PROCEDURE — S0028 INJECTION, FAMOTIDINE, 20 MG: HCPCS | Performed by: SURGERY

## 2019-08-28 PROCEDURE — 0F798DZ DILATION OF COMMON BILE DUCT WITH INTRALUMINAL DEVICE, VIA NATURAL OR ARTIFICIAL OPENING ENDOSCOPIC: ICD-10-PCS | Performed by: INTERNAL MEDICINE

## 2019-08-28 PROCEDURE — 80076 HEPATIC FUNCTION PANEL: CPT | Performed by: INTERNAL MEDICINE

## 2019-08-28 DEVICE — COTTON-LEUNG BILIARY STENT
Type: IMPLANTABLE DEVICE | Status: FUNCTIONAL
Brand: COTTON-LEUNG

## 2019-08-28 RX ORDER — MAGNESIUM SULFATE HEPTAHYDRATE 40 MG/ML
2 INJECTION, SOLUTION INTRAVENOUS ONCE
Status: COMPLETED | OUTPATIENT
Start: 2019-08-28 | End: 2019-08-28

## 2019-08-28 NOTE — ANESTHESIA POSTPROCEDURE EVALUATION
[facility] Patient Status:  Inpatient   Age/Gender 80year old female MRN [id number]   Location [facility]. Attending Emigdio Erickson, [facility] Se Day # 1 PCP Jhony Issa MD       Anesthesia Post-op Note    Procedur

## 2019-08-28 NOTE — PLAN OF CARE
Upon assessment, VSS, afebrile. Pt up to chair this am and c/o abdominal pain, rated 8/10.  Pt's daughter at bedside and states pt gets angry/combative with narcotics and has needed restraints in the past. Azeem JEWELL at bedside and Tylenol IV ordered ATC an scale  - Administer analgesics based on type and severity of pain and evaluate response  - Implement non-pharmacological measures as appropriate and evaluate response  - Consider cultural and social influences on pain and pain management  - Manage/alleviat form as appropriate  - Assess patient's ability to be responsible for managing their own health  - Refer to Case Management Department for coordinating discharge planning if the patient needs post-hospital services based on physician/LIP order or complex n of redness and/or skin breakdown  - Initiate interventions, skin care algorithm/standards of care as needed  Outcome: Progressing  Goal: Incision(s), wounds(s) or drain site(s) healing without S/S of infection  Description  INTERVENTIONS:  - Assess and doc

## 2019-08-28 NOTE — PROGRESS NOTES
[facility]  Progress Note    David Sorianosharda Patient Status:  Inpatient    1930 MRN [id number]   [facility] ENDOSCOPY Attending Negro Fowler,  [facility] Se Day # 1 PCP Felipe Mccollum MD     Subjective:    Patient sitting up in c Lipoma of buttock      Impression:     79 y/o S/P: Diagnostic laparoscopy with laparoscopic lysis of adhesions, attempted laparoscopic cholecystectomy with open conversion with cholecystectomy and repair of common bile duct injury  -tolerated NG removal  -

## 2019-08-28 NOTE — PLAN OF CARE
Upon reassessment, VSS and adequate pain control with Tylenol IV and Toradol. Pt tolerated clamping trial of NGT for 6hrs without nausea/bloating. Bowel sounds present x4. Pt reports passing flatus. NGT residual is 0ml.  NGT d/c'd as ordered and pt to attem

## 2019-08-28 NOTE — H&P
History & Physical Examination    Patient Name: Jarrod Sierra  MRN: [id number]  [facility]: [id number]  YOB: 1930    Diagnosis: Lipoma of buttock [D17.1]  Lipoma of buttock  Bile leak    Present Illness:  Jarrod Sierra is a 80year old fe metoprolol Tartrate (LOPRESSOR) 5 MG/5ML injection 5 mg 5 mg Intravenous Q6H   lactated ringers infusion  Intravenous Continuous   dextrose 5 % and 0.45 % NaCl with KCl 20 mEq infusion  Intravenous Continuous   diphenhydrAMINE (BENADRYL) cap/tab 25 mg 25 MS   • Heart Disorder Brother 78        Myocardial infarction     Social History    Tobacco Use      Smoking status: Former Smoker        Packs/day: 0.06        Years: 60.00        Pack years: 3.6        Start date: 1/5/1952        Quit date: 1/5/2004

## 2019-08-28 NOTE — PROGRESS NOTES
[facility] Hospitalist Progress Note     PCP: Melinda Fajardo MD    Chief Complaint: follow-up    Overnight/Interim Events:      SUBJECTIVE:  Pt is tired. She denies pain, nausea and vomiting.      OBJECTIVE:  Temp:  [97.5 °F (36.4 °C)-98.2 °F (36.8 °C)] 97.6 • lactated ringers 20 mL/hr at 08/26/19 1003   • KCl in Dextrose-NaCl 125 mL/hr at 08/28/19 0454     morphINE sulfate **OR** morphINE sulfate, ketorolac (TORADOL) injection, diphenhydrAMINE, ondansetron HCl, Metoclopramide HCl, HYDROmorphone (DILAUDID)

## 2019-08-28 NOTE — PLAN OF CARE
Pt is A&O x3, disoriented to time and foregtful. VSS and afebrile. Sinus rhythm on tele. 2 L O2 via nasal cannula, lung sounds clear, diminished in bases bilaterally. Denies pain. Abdomen is soft, nontender and rounded. Bowel sounds active x4.  Reports pass unsuccessful or patient reports new pain  - Anticipate increased pain with activity and pre-medicate as appropriate  Outcome: Progressing     Problem: RISK FOR INFECTION - ADULT  Goal: Absence of fever/infection during anticipated neutropenic period  Descr ADULT  Goal: Absence of urinary retention  Description  INTERVENTIONS:  - Assess patient’s ability to void and empty bladder  - Monitor intake/output and perform bladder scan as needed  - Follow urinary retention protocol/standard of care  - Consider colla dressing/incision, wound bed, drain sites and surrounding tissue  - Implement wound care per orders  - Initiate isolation precautions as appropriate  - Initiate Pressure Ulcer prevention bundle as indicated  Outcome: Progressing

## 2019-08-28 NOTE — ANESTHESIA PREPROCEDURE EVALUATION
PRE-OP EVALUATION    Patient Name: Brandon Cortes    Pre-op Diagnosis: bile leak    Procedure(s):  ENDOSCOPIC RETROGRADE CHOLANGIOPANCREATOGRAPHY    Surgeon(s) and Role:     * Soraida Issa MD - Primary    Pre-op vitals reviewed.   Temp: 97.6 °F (NOVOLOG) 100 UNIT/ML flexpen 2 Units 2 Units Subcutaneous Once   [COMPLETED] sodium chloride 0.9% IV bolus 500 mL 500 mL Intravenous Once   vancomycin HCl (FIRVANQ) 50 MG/ML oral solution 125 mg 125 mg Per J Tube Daily       Outpatient Medications:    ace CHOLANGIOPANCREATOGRAPHY (ERCP) N/A 8/14/2019    Performed by Dianna Phan MD at [facility] ENDOSCOPY   • [facility]  08/2019    CBD stones removed   • EYE SURGERY     • FRACTURE SURGERY      upper right arm, right leg   • [facility] normal.                 Other findings            ASA: 3   Plan: MAC  NPO status verified and patient meets guidelines. Patient has taken beta blockers in last 24 hours. Comment: Pt is POD #2 from a Lap arturo which converted to an open  Procedure.  Pt

## 2019-08-29 LAB — HAV IGM SER QL: 1.4 MG/DL (ref 1.6–2.6)

## 2019-08-29 PROCEDURE — 83735 ASSAY OF MAGNESIUM: CPT | Performed by: INTERNAL MEDICINE

## 2019-08-29 PROCEDURE — S0028 INJECTION, FAMOTIDINE, 20 MG: HCPCS | Performed by: SURGERY

## 2019-08-29 RX ORDER — AMLODIPINE BESYLATE 5 MG/1
5 TABLET ORAL DAILY
Status: DISCONTINUED | OUTPATIENT
Start: 2019-08-29 | End: 2019-08-30

## 2019-08-29 RX ORDER — METOPROLOL SUCCINATE 25 MG/1
25 TABLET, EXTENDED RELEASE ORAL
Status: DISCONTINUED | OUTPATIENT
Start: 2019-08-29 | End: 2019-08-30

## 2019-08-29 RX ORDER — PANTOPRAZOLE SODIUM 40 MG/1
40 TABLET, DELAYED RELEASE ORAL
Status: DISCONTINUED | OUTPATIENT
Start: 2019-08-30 | End: 2019-08-30

## 2019-08-29 RX ORDER — LOSARTAN POTASSIUM 50 MG/1
50 TABLET ORAL DAILY
Status: DISCONTINUED | OUTPATIENT
Start: 2019-08-29 | End: 2019-08-30

## 2019-08-29 RX ORDER — PRAVASTATIN SODIUM 10 MG
10 TABLET ORAL NIGHTLY
Status: DISCONTINUED | OUTPATIENT
Start: 2019-08-29 | End: 2019-08-30

## 2019-08-29 RX ORDER — MAGNESIUM OXIDE 400 MG (241.3 MG MAGNESIUM) TABLET
800 TABLET ONCE
Status: COMPLETED | OUTPATIENT
Start: 2019-08-29 | End: 2019-08-29

## 2019-08-29 NOTE — PHYSICAL THERAPY NOTE
Attempted to see patient for PT evaluation. Session approved by RN. Pt resting in bed after having ambulated in hallway, politely requesting PT return at a different time.  Discussed importance of ambulating in hallway at least TID; pt and daughter Janine Luke

## 2019-08-29 NOTE — PLAN OF CARE
Problem: Patient/Family Goals  Goal: Patient/Family Long Term Goal  Description  Patient's Long Term Goal: Discharge patient    Interventions:  - Pain tolerable  - Tolerating diet  - Return to previous ADLs  - See additional Care Plan goals for specific Instruct pt to call for assistance with activity based on assessment  - Modify environment to reduce risk of injury  - Provide assistive devices as appropriate  - Consider OT/PT consult to assist with strengthening/mobility  - Encourage toileting schedule appropriate  - Advance diet as tolerated, if ordered  - Obtain nutritional consult as needed  - Evaluate fluid balance  Outcome: Progressing  Goal: Maintains or returns to baseline bowel function  Description  INTERVENTIONS:  - Assess bowel function  - Génesis Ly

## 2019-08-29 NOTE — PROGRESS NOTES
PATIENT HAS IV FLUIDS INFUSING, ON 2L OXYGEN PER NC, [place], FORGETFUL AT TIMES, ON TELE RUNNING NSR, VOIDING WELL-HAS INCREASED FREQUENCY/URGENCY, IV ZOSYN AND ORAL VANCOMYCIN SCHEDULED, OFIRMEV SCHEDULED, INCISION IS C/D/I, SAAD DRAIN X1 WITH BILE DRAINAGE, A

## 2019-08-29 NOTE — PROGRESS NOTES
08/28/19 1926   Clinical Encounter Type   Visited With Patient  (Left Advance Directives with patient for family to peruse. )   Routine Visit Introduction   Surgical Visit Post-op  (Patient not very interested in a visit.  She was polite though. )   Refe

## 2019-08-29 NOTE — PROGRESS NOTES
[facility]  Progress Note    David Cortes Patient Status:  Inpatient    1930 MRN [id number]   [facility] 3NW-A Attending Negro Fowler MD   [address] Day # 2 PCP Felipe Mccollum MD     Subjective:    Patient sitting up in chair answered  Dc planning    FANTA Flaherty [address] [place] [address]  General Surgery  8/29/2019

## 2019-08-29 NOTE — PLAN OF CARE
Problem: Patient/Family Goals  Goal: Patient/Family Long Term Goal  Description  Patient's Long Term Goal: Discharge patient    Interventions:  - Pain tolerable  - Tolerating diet  - Return to previous ADLs  - See additional Care Plan goals for specific Instruct pt to call for assistance with activity based on assessment  - Modify environment to reduce risk of injury  - Provide assistive devices as appropriate  - Consider OT/PT consult to assist with strengthening/mobility  - Encourage toileting schedule appropriate  - Advance diet as tolerated, if ordered  - Obtain nutritional consult as needed  - Evaluate fluid balance  Outcome: Progressing  Goal: Maintains or returns to baseline bowel function  Description  INTERVENTIONS:  - Assess bowel function  - Charu Self monitor.

## 2019-08-29 NOTE — CM/SW NOTE
Pt agreeable to [facility]. Rochelle Juárez from [facility] saw pt and accepted pt. HH order and F2F completed.

## 2019-08-29 NOTE — OPERATIVE REPORT
[address] [place] [place] [address] REPORT   PATIENT NAME: Caio Cotter  MRN: [id number]  DATE OF OPERATION: 8/28/2019  PREOPERATIVE DIAGNOSIS: post OP bile leak  POSTOPERATIVE DIAGNOSIS:    1.  Small distal 6 mm CBD stone   2.   Narrowed mid CBD with dilated c performed and no other stones were removed. Excellent biliary drainage was noted. A 10 FR x 9 cm OASIS biliary stent was placed across the leak. Contrast was seen going into the SAAD drain. No injection of contrast into the PD was noted.   Scope was withdr

## 2019-08-29 NOTE — PLAN OF CARE
Pt s/p ERCP ok to resume clear liquid diet per Gi. Voiding very frequently Q45 min MD notified orders recd to decrease fluids to 75cchr. Pt up and ambulating to the bathroom with walker. SAAD drain intact draining bile.  Pt denies pain vert pleasant alert x3

## 2019-08-29 NOTE — PROGRESS NOTES
[facility] Hospitalist Progress Note     PCP: Pascual Salgado MD    Chief Complaint: follow-up    Overnight/Interim Events:      SUBJECTIVE:     No appetite. Pain controlled. no sob, n/v    OBJECTIVE:  Temp:  [97.5 °F (36.4 °C)-98.9 °F (37.2 °C)] 98.1 °F (36. Intravenous Q8H   • vancomycin HCl  125 mg Per J Tube Daily     • lactated ringers 20 mL/hr at 08/26/19 1003   • KCl in Dextrose-NaCl 75 mL/hr at 08/29/19 0620     morphINE sulfate **OR** morphINE sulfate, diphenhydrAMINE, ondansetron HCl, Metoclopramide H

## 2019-08-29 NOTE — HOME CARE LIAISON
MET WITH PTNT AND DTR ALEJO AND OFFERED CHOICE  OF AGENCIES. PTNT AGREEABLE TO [facility]. MET WITH PTNT AND DTR ALEJO TO DISCUSS HOME HEALTH SERVICES AND COVERAGE CRITERIA. PTNT AGREEABLE TO [facility]. PTNT GIVEN RESIDENTIAL BROCHURE.  CAN

## 2019-08-30 VITALS
DIASTOLIC BLOOD PRESSURE: 72 MMHG | WEIGHT: 143 LBS | SYSTOLIC BLOOD PRESSURE: 144 MMHG | OXYGEN SATURATION: 93 % | BODY MASS INDEX: 24.41 KG/M2 | TEMPERATURE: 98 F | RESPIRATION RATE: 18 BRPM | HEIGHT: 64 IN | HEART RATE: 90 BPM

## 2019-08-30 LAB
ANION GAP SERPL CALC-SCNC: 3 MMOL/L (ref 0–18)
BASOPHILS # BLD AUTO: 0.11 X10(3) UL (ref 0–0.2)
BASOPHILS NFR BLD AUTO: 1.1 %
BUN BLD-MCNC: 7 MG/DL (ref 7–18)
BUN/CREAT SERPL: 10.1 (ref 10–20)
CALCIUM BLD-MCNC: 8.1 MG/DL (ref 8.5–10.1)
CHLORIDE SERPL-SCNC: 109 MMOL/L (ref 98–112)
CO2 SERPL-SCNC: 29 MMOL/L (ref 21–32)
CREAT BLD-MCNC: 0.69 MG/DL (ref 0.55–1.02)
DEPRECATED RDW RBC AUTO: 40.4 FL (ref 35.1–46.3)
EOSINOPHIL # BLD AUTO: 0.16 X10(3) UL (ref 0–0.7)
EOSINOPHIL NFR BLD AUTO: 1.5 %
ERYTHROCYTE [DISTWIDTH] IN BLOOD BY AUTOMATED COUNT: 13.3 % (ref 11–15)
GLUCOSE BLD-MCNC: 96 MG/DL (ref 70–99)
HAV IGM SER QL: 1.9 MG/DL (ref 1.6–2.6)
HCT VFR BLD AUTO: 25.6 % (ref 35–48)
HGB BLD-MCNC: 8.2 G/DL (ref 12–16)
IMM GRANULOCYTES # BLD AUTO: 0.03 X10(3) UL (ref 0–1)
IMM GRANULOCYTES NFR BLD: 0.3 %
LYMPHOCYTES # BLD AUTO: 0.94 X10(3) UL (ref 1–4)
LYMPHOCYTES NFR BLD AUTO: 9 %
MCH RBC QN AUTO: 26.5 PG (ref 26–34)
MCHC RBC AUTO-ENTMCNC: 32 G/DL (ref 31–37)
MCV RBC AUTO: 82.8 FL (ref 80–100)
MONOCYTES # BLD AUTO: 0.57 X10(3) UL (ref 0.1–1)
MONOCYTES NFR BLD AUTO: 5.5 %
NEUTROPHILS # BLD AUTO: 8.64 X10 (3) UL (ref 1.5–7.7)
NEUTROPHILS # BLD AUTO: 8.64 X10(3) UL (ref 1.5–7.7)
NEUTROPHILS NFR BLD AUTO: 82.6 %
OSMOLALITY SERPL CALC.SUM OF ELEC: 290 MOSM/KG (ref 275–295)
PLATELET # BLD AUTO: 268 10(3)UL (ref 150–450)
POTASSIUM SERPL-SCNC: 3.7 MMOL/L (ref 3.5–5.1)
RBC # BLD AUTO: 3.09 X10(6)UL (ref 3.8–5.3)
SODIUM SERPL-SCNC: 141 MMOL/L (ref 136–145)
WBC # BLD AUTO: 10.5 X10(3) UL (ref 4–11)

## 2019-08-30 PROCEDURE — 97161 PT EVAL LOW COMPLEX 20 MIN: CPT

## 2019-08-30 PROCEDURE — 85025 COMPLETE CBC W/AUTO DIFF WBC: CPT | Performed by: INTERNAL MEDICINE

## 2019-08-30 PROCEDURE — 80048 BASIC METABOLIC PNL TOTAL CA: CPT | Performed by: INTERNAL MEDICINE

## 2019-08-30 PROCEDURE — 97116 GAIT TRAINING THERAPY: CPT

## 2019-08-30 PROCEDURE — 83735 ASSAY OF MAGNESIUM: CPT | Performed by: INTERNAL MEDICINE

## 2019-08-30 RX ORDER — AMOXICILLIN AND CLAVULANATE POTASSIUM 875; 125 MG/1; MG/1
1 TABLET, FILM COATED ORAL 2 TIMES DAILY
Qty: 14 TABLET | Refills: 0 | Status: SHIPPED | OUTPATIENT
Start: 2019-08-30 | End: 2019-09-06

## 2019-08-30 RX ORDER — POTASSIUM CHLORIDE 20 MEQ/1
40 TABLET, EXTENDED RELEASE ORAL ONCE
Status: COMPLETED | OUTPATIENT
Start: 2019-08-30 | End: 2019-08-30

## 2019-08-30 NOTE — PROGRESS NOTES
NURSING DISCHARGE NOTE    Discharged Home via Wheelchair. Accompanied by Family member  Belongings Taken by patient/family. PATIENT DISCHARGED HOME WITH RESIDENTIAL HOME HEALTH IN STABLE CONDITION.  PATIENT LEFT THE FLOOR VIA WHEELCHAIR AND WAS ACCO

## 2019-08-30 NOTE — PLAN OF CARE
Problem: Patient/Family Goals  Goal: Patient/Family Long Term Goal  Description  Patient's Long Term Goal: Discharge patient    Interventions:  - Pain tolerable  - Tolerating diet  - Return to previous ADLs  - See additional Care Plan goals for specific Instruct pt to call for assistance with activity based on assessment  - Modify environment to reduce risk of injury  - Provide assistive devices as appropriate  - Consider OT/PT consult to assist with strengthening/mobility  - Encourage toileting schedule appropriate  - Advance diet as tolerated, if ordered  - Obtain nutritional consult as needed  - Evaluate fluid balance  Outcome: Progressing  Goal: Maintains or returns to baseline bowel function  Description  INTERVENTIONS:  - Assess bowel function  - Gibson Alejandro in reach, will continue to monitor.

## 2019-08-30 NOTE — DIETARY NOTE
[facility] NIKKI Cortes     Admitting diagnosis:  Lipoma of buttock [D17.1]  Lipoma of buttock    Ht: 162.6 cm (5' 4\")  Wt: 64.9 kg (143 lb). This is 119% of IBW  Body mass index is 24.55 kg/m².   IBW: 54.54 kg    Labs/M

## 2019-08-30 NOTE — DISCHARGE SUMMARY
General Medicine Discharge Summary     Patient ID:  Tam Cortes  80year old  4/27/1930    Admit date: 8/26/2019    Discharge date and time: 8/30/19    Attending Physician: Reno Ponce, HTN  -tele  -CCB, ARB when on PO     # HL  -statin     # GERD  -PPI, wean as o/p if appropriate    Consults: IP CONSULT TO PHARMACY  IP CONSULT TO HOSPITALIST  IP CONSULT TO SPIRITUAL CARE  IP CONSULT TO SOCIAL WORK    Operative Procedures: Procedure(s) (L Miki Blackman, [facility] Hospitalist  [phone]

## 2019-08-30 NOTE — PROGRESS NOTES
[facility]  Progress Note    [facility]:  [facility]    1930 MRN [id number]   [facility] 3NW-A Attending Dayo Valdez MD   [facility] Day # 3 PCP Lynn Amin MD     Subjective:    Patient tolerating soft die injury  -SAAD output scant in bulb  -tolerating PO diet, afebrile, no leukocytosis    Plan:    Ok for [employer] home today  Reviewed post op restrictions  Reviewed antibiotics for dc home  Will f/u with Dr Summer Silver next week on Tuesday  All questions answered    David

## 2019-08-30 NOTE — PHYSICAL THERAPY NOTE
PHYSICAL THERAPY QUICK EVALUATION - INPATIENT    Room Number: 248/899-O  Evaluation Date: 8/30/2019  Presenting Problem: s/p open arturo, ANKIT  Physician Order: PT Eval and Treat    Problem List  Active Problems:    Lipoma of buttock      Past Medical Hist by Maxine Stein MD at [facility] MAIN OR   • OTHER  HIP INJURY    orif Right hip    • UPPER GI ENDOSCOPY,EXAM         HOME SITUATION  Type of Home: House   Home Layout: Two level  Stairs to Enter : 2  Railing: Yes  Stairs to Bedroom: 15  Railing: Yes    Lives With independent  Distance (ft): 200  Assistive Device: Rolling walker  Pattern: Within Functional Limits          Skilled Therapy Provided: Pt received supine in bed, agreeable to PT with encouragement.   Pt initially agreeable to limited eval, however then wan

## 2019-09-09 ENCOUNTER — HOSPITAL ENCOUNTER (EMERGENCY)
Facility: HOSPITAL | Age: 84
Discharge: HOME | End: 2019-09-09
Attending: EMERGENCY MEDICINE
Payer: MEDICARE

## 2019-09-09 VITALS
DIASTOLIC BLOOD PRESSURE: 55 MMHG | TEMPERATURE: 97 F | RESPIRATION RATE: 22 BRPM | SYSTOLIC BLOOD PRESSURE: 112 MMHG | HEART RATE: 87 BPM | OXYGEN SATURATION: 100 %

## 2019-09-09 DIAGNOSIS — R63.0 DECREASED APPETITE: Primary | ICD-10-CM

## 2019-09-09 LAB
ALBUMIN SERPL-MCNC: 3.2 G/DL (ref 3.4–5)
ALBUMIN/GLOB SERPL: 0.7 {RATIO} (ref 1–2)
ALP LIVER SERPL-CCNC: 78 U/L (ref 55–142)
ALT SERPL-CCNC: 11 U/L (ref 13–56)
ANION GAP SERPL CALC-SCNC: 5 MMOL/L (ref 0–18)
AST SERPL-CCNC: 14 U/L (ref 15–37)
BASOPHILS # BLD AUTO: 0.15 X10(3) UL (ref 0–0.2)
BASOPHILS NFR BLD AUTO: 1.1 %
BILIRUB SERPL-MCNC: 0.3 MG/DL (ref 0.1–2)
BILIRUB UR QL STRIP.AUTO: NEGATIVE
BILIRUB UR QL STRIP.AUTO: NEGATIVE
BUN BLD-MCNC: 18 MG/DL (ref 7–18)
BUN/CREAT SERPL: 19.1 (ref 10–20)
CALCIUM BLD-MCNC: 9.2 MG/DL (ref 8.5–10.1)
CHLORIDE SERPL-SCNC: 103 MMOL/L (ref 98–112)
CLARITY UR REFRACT.AUTO: CLEAR
CLARITY UR REFRACT.AUTO: CLEAR
CO2 SERPL-SCNC: 28 MMOL/L (ref 21–32)
COLOR UR AUTO: YELLOW
COLOR UR AUTO: YELLOW
CREAT BLD-MCNC: 0.94 MG/DL (ref 0.55–1.02)
DEPRECATED RDW RBC AUTO: 41.9 FL (ref 35.1–46.3)
EOSINOPHIL # BLD AUTO: 0.19 X10(3) UL (ref 0–0.7)
EOSINOPHIL NFR BLD AUTO: 1.4 %
ERYTHROCYTE [DISTWIDTH] IN BLOOD BY AUTOMATED COUNT: 13.4 % (ref 11–15)
GLOBULIN PLAS-MCNC: 4.6 G/DL (ref 2.8–4.4)
GLUCOSE BLD-MCNC: 99 MG/DL (ref 70–99)
GLUCOSE UR STRIP.AUTO-MCNC: NEGATIVE MG/DL
GLUCOSE UR STRIP.AUTO-MCNC: NEGATIVE MG/DL
HCT VFR BLD AUTO: 32.5 % (ref 35–48)
HGB BLD-MCNC: 9.9 G/DL (ref 12–16)
HYALINE CASTS #/AREA URNS AUTO: PRESENT /LPF
HYALINE CASTS #/AREA URNS AUTO: PRESENT /LPF
IMM GRANULOCYTES # BLD AUTO: 0.05 X10(3) UL (ref 0–1)
IMM GRANULOCYTES NFR BLD: 0.4 %
KETONES UR STRIP.AUTO-MCNC: NEGATIVE MG/DL
KETONES UR STRIP.AUTO-MCNC: NEGATIVE MG/DL
LIPASE SERPL-CCNC: 46 U/L (ref 73–393)
LYMPHOCYTES # BLD AUTO: 1.74 X10(3) UL (ref 1–4)
LYMPHOCYTES NFR BLD AUTO: 13 %
M PROTEIN MFR SERPL ELPH: 7.8 G/DL (ref 6.4–8.2)
MCH RBC QN AUTO: 26.1 PG (ref 26–34)
MCHC RBC AUTO-ENTMCNC: 30.5 G/DL (ref 31–37)
MCV RBC AUTO: 85.5 FL (ref 80–100)
MONOCYTES # BLD AUTO: 0.72 X10(3) UL (ref 0.1–1)
MONOCYTES NFR BLD AUTO: 5.4 %
NEUTROPHILS # BLD AUTO: 10.51 X10 (3) UL (ref 1.5–7.7)
NEUTROPHILS # BLD AUTO: 10.51 X10(3) UL (ref 1.5–7.7)
NEUTROPHILS NFR BLD AUTO: 78.7 %
NITRITE UR QL STRIP.AUTO: NEGATIVE
NITRITE UR QL STRIP.AUTO: NEGATIVE
OSMOLALITY SERPL CALC.SUM OF ELEC: 284 MOSM/KG (ref 275–295)
PH UR STRIP.AUTO: 7 [PH] (ref 4.5–8)
PH UR STRIP.AUTO: 7.5 [PH] (ref 4.5–8)
PLATELET # BLD AUTO: 465 10(3)UL (ref 150–450)
POTASSIUM SERPL-SCNC: 4.2 MMOL/L (ref 3.5–5.1)
PROT UR STRIP.AUTO-MCNC: NEGATIVE MG/DL
PROT UR STRIP.AUTO-MCNC: NEGATIVE MG/DL
RBC # BLD AUTO: 3.8 X10(6)UL (ref 3.8–5.3)
RBC #/AREA URNS AUTO: >10 /HPF
SODIUM SERPL-SCNC: 136 MMOL/L (ref 136–145)
SP GR UR STRIP.AUTO: 1.02 (ref 1–1.03)
SP GR UR STRIP.AUTO: 1.02 (ref 1–1.03)
UROBILINOGEN UR STRIP.AUTO-MCNC: 0.2 MG/DL
UROBILINOGEN UR STRIP.AUTO-MCNC: <2 MG/DL
WBC # BLD AUTO: 13.4 X10(3) UL (ref 4–11)

## 2019-09-09 PROCEDURE — 99284 EMERGENCY DEPT VISIT MOD MDM: CPT

## 2019-09-09 PROCEDURE — 81001 URINALYSIS AUTO W/SCOPE: CPT | Performed by: EMERGENCY MEDICINE

## 2019-09-09 PROCEDURE — 80053 COMPREHEN METABOLIC PANEL: CPT | Performed by: EMERGENCY MEDICINE

## 2019-09-09 PROCEDURE — 85025 COMPLETE CBC W/AUTO DIFF WBC: CPT | Performed by: EMERGENCY MEDICINE

## 2019-09-09 PROCEDURE — 99285 EMERGENCY DEPT VISIT HI MDM: CPT

## 2019-09-09 PROCEDURE — 83690 ASSAY OF LIPASE: CPT | Performed by: EMERGENCY MEDICINE

## 2019-09-09 PROCEDURE — 96360 HYDRATION IV INFUSION INIT: CPT

## 2019-09-09 PROCEDURE — 96361 HYDRATE IV INFUSION ADD-ON: CPT

## 2019-09-09 PROCEDURE — 81015 MICROSCOPIC EXAM OF URINE: CPT | Performed by: EMERGENCY MEDICINE

## 2019-09-09 RX ORDER — SODIUM CHLORIDE 9 MG/ML
INJECTION, SOLUTION INTRAVENOUS CONTINUOUS
Status: DISCONTINUED | OUTPATIENT
Start: 2019-09-09 | End: 2019-09-09

## 2019-09-09 NOTE — ED PROVIDER NOTES
Patient Seen in: [facility] Emergency Department    History   Patient presents with:  Postop/Procedure Problem    Stated Complaint: post op comp, decreased PO intake     HPI    Patient is an 80-year-old female, presenting for evaluation of general w Date   • APPENDECTOMY     • CATARACT     • CHOLECYSTECTOMY  08/2019    Dr. Yi Pratt   • COLONOSCOPY  07/06/2004    Diverticulosis, no polyps   • COLONOSCOPY     • ENDOSCOPIC RETROGRADE CHOLANGIOPANCREATOGRAPHY (ERCP) N/A 8/28/2019    Performed by Kendall Weston Extraocular muscles are intact. Pupils are equal and reactive to light. Oropharynx is pink and moist.  NECK: Neck is supple and nontender. The trachea is midline. LUNGS: Lungs are clear to auscultation bilaterally, respirations are unlabored.    HEART: Re CULTURE REFLEX - Abnormal; Notable for the following components:    Blood Urine Moderate (*)     Leukocyte Esterase Urine Small (*)     RBC URINE >10 (*)     Squamous Epi.  Cells Moderate (*)     Mucous Urine 1+ (*)     All other components within normal li may be required, depending on the patient's clinical course. Patient should be brought back to the emergency department for reevaluation of any problems, worsening symptoms, or as discussed.     Patient and family verbalized understanding and are comfort

## 2019-09-09 NOTE — ED INITIAL ASSESSMENT (HPI)
Pt had gallbladder sx 3 weeks ago, pt presents with decreased appetite, nausea, diarrhea x1 Saturday.  PCP concerned with dehydration

## 2019-09-10 NOTE — ED NOTES
Report received from Barrie, [facility]. Patient and family updated on plan of care, no questions at this time.

## 2019-09-10 NOTE — ED NOTES
Urine specimen sent. Pt able to use bedpan.   Earlier specimen contaminated with stool and unable to be sent upon her arrival.

## 2019-11-25 ENCOUNTER — ANESTHESIA EVENT (OUTPATIENT)
Dept: ENDOSCOPY | Facility: HOSPITAL | Age: 84
End: 2019-11-25
Payer: MEDICARE

## 2019-11-26 ENCOUNTER — ANESTHESIA (OUTPATIENT)
Dept: ENDOSCOPY | Facility: HOSPITAL | Age: 84
End: 2019-11-26
Payer: MEDICARE

## 2019-11-26 ENCOUNTER — HOSPITAL ENCOUNTER (OUTPATIENT)
Facility: HOSPITAL | Age: 84
Setting detail: HOSPITAL OUTPATIENT SURGERY
Discharge: HOME | End: 2019-11-26
Attending: INTERNAL MEDICINE | Admitting: INTERNAL MEDICINE
Payer: MEDICARE

## 2019-11-26 ENCOUNTER — APPOINTMENT (OUTPATIENT)
Dept: GENERAL RADIOLOGY | Facility: HOSPITAL | Age: 84
End: 2019-11-26
Attending: INTERNAL MEDICINE
Payer: MEDICARE

## 2019-11-26 VITALS
HEART RATE: 81 BPM | OXYGEN SATURATION: 92 % | WEIGHT: 138 LBS | DIASTOLIC BLOOD PRESSURE: 63 MMHG | TEMPERATURE: 98 F | SYSTOLIC BLOOD PRESSURE: 139 MMHG | BODY MASS INDEX: 23.56 KG/M2 | RESPIRATION RATE: 18 BRPM | HEIGHT: 64 IN

## 2019-11-26 DIAGNOSIS — K80.50 CHOLEDOCHOLITHIASIS: ICD-10-CM

## 2019-11-26 DIAGNOSIS — K83.9 BILE LEAK: ICD-10-CM

## 2019-11-26 PROCEDURE — 0FPB8DZ REMOVAL OF INTRALUMINAL DEVICE FROM HEPATOBILIARY DUCT, VIA NATURAL OR ARTIFICIAL OPENING ENDOSCOPIC: ICD-10-PCS | Performed by: INTERNAL MEDICINE

## 2019-11-26 PROCEDURE — 74328 X-RAY BILE DUCT ENDOSCOPY: CPT | Performed by: INTERNAL MEDICINE

## 2019-11-26 RX ORDER — LIDOCAINE HYDROCHLORIDE 10 MG/ML
INJECTION, SOLUTION EPIDURAL; INFILTRATION; INTRACAUDAL; PERINEURAL AS NEEDED
Status: DISCONTINUED | OUTPATIENT
Start: 2019-11-26 | End: 2019-11-26 | Stop reason: SURG

## 2019-11-26 RX ORDER — SODIUM CHLORIDE, SODIUM LACTATE, POTASSIUM CHLORIDE, CALCIUM CHLORIDE 600; 310; 30; 20 MG/100ML; MG/100ML; MG/100ML; MG/100ML
INJECTION, SOLUTION INTRAVENOUS CONTINUOUS
Status: CANCELLED | OUTPATIENT
Start: 2019-11-26

## 2019-11-26 RX ORDER — NALOXONE HYDROCHLORIDE 0.4 MG/ML
80 INJECTION, SOLUTION INTRAMUSCULAR; INTRAVENOUS; SUBCUTANEOUS AS NEEDED
Status: CANCELLED | OUTPATIENT
Start: 2019-11-26 | End: 2019-11-26

## 2019-11-26 RX ORDER — SODIUM CHLORIDE, SODIUM LACTATE, POTASSIUM CHLORIDE, CALCIUM CHLORIDE 600; 310; 30; 20 MG/100ML; MG/100ML; MG/100ML; MG/100ML
INJECTION, SOLUTION INTRAVENOUS CONTINUOUS
Status: DISCONTINUED | OUTPATIENT
Start: 2019-11-26 | End: 2019-11-26

## 2019-11-26 RX ADMIN — SODIUM CHLORIDE, SODIUM LACTATE, POTASSIUM CHLORIDE, CALCIUM CHLORIDE: 600; 310; 30; 20 INJECTION, SOLUTION INTRAVENOUS at 13:41:00

## 2019-11-26 RX ADMIN — LIDOCAINE HYDROCHLORIDE 50 MG: 10 INJECTION, SOLUTION EPIDURAL; INFILTRATION; INTRACAUDAL; PERINEURAL at 13:24:00

## 2019-11-26 NOTE — H&P
History & Physical Examination    Patient Name: Cathleen Robb  MRN: [id number]  CSN: [id number]  YOB: 1930    Diagnosis: Choledocholithiasis [K80.50]  Bile leak [K83.9]      Present Illness:  Cathleen Robb is a 80year old female i PRN  propofol (DIPRIVAN) injection, , , PRN  propofol (DIPRIVAN) infusion, , , Continuous PRN        Allergies:   Ciprofloxacin           HIVES  Doxycycline             NAUSEA AND VOMITING    Past Surgical History:   Procedure Laterality Date   • APPENDECT the patient/family. They understand and agree to proceed with plan of care. [ x ] I have reviewed the History and Physical done within the last 30 days. Any changes noted above.     Ray Bonner  11/26/2019  1:38 PM

## 2019-11-26 NOTE — OPERATIVE REPORT
[address] REPORT   PATIENT NAME: Sravani Bishop  MRN: [id number]  DATE OF OPERATION: 11/26/2019  PREOPERATIVE DIAGNOSIS: history of post cholecystectomy bile leak, history of choledocholithiasis and mid CBD narrowing  POSTOPERATIVE DIAG residual biliary stones.   Previous biliary stent was removed  RECOMMENDATIONS: f/u PCP PRN  DISCHARGE:  On discharge, the patient was given an after-visit summary detailing the procedure, findings, followup plans, and an updated medication list.     Thank

## 2019-11-26 NOTE — ANESTHESIA POSTPROCEDURE EVALUATION
[facility] Patient Status:  Hospital Outpatient Surgery   Age/Gender 80year old female MRN [id number]   Location [facility]. Attending Krissy Cooper MD   Hosp Day # 0 PCP Tomer Malagon MD       Anesthesia

## 2019-11-26 NOTE — ANESTHESIA PREPROCEDURE EVALUATION
PRE-OP EVALUATION    Patient Name: Amanda Cortes    Pre-op Diagnosis: Choledocholithiasis [K80.50]  Bile leak [K83.9]    Procedure(s):  ENDOSCOPIC RETROGRADE CHOLANGIOPANCREATOGRAPHY WITH LUCÍA    Surgeon(s) and Role:     * Radha Corbett MD RETROGRADE CHOLANGIOPANCREATOGRAPHY (ERCP) N/A 8/14/2019    Performed by Radha Corbett MD at [facility] ENDOSCOPY   • [place]  08/2019    CBD stones removed   • EYE SURGERY     • FRACTURE SURGERY      upper right arm, right leg   • HYSTERECTOMY  1 proposed anesthetic was had in the preoperative area, including risk of conversion to a general anesthetic, nausea/vomiting, dental damage, sore throat and allergic/ adverse reactions to medications administered.  The possibility of needing to convert to ge

## 2019-12-02 ENCOUNTER — APPOINTMENT (OUTPATIENT)
Dept: LAB | Facility: HOSPITAL | Age: 84
End: 2019-12-02
Payer: MEDICARE

## 2019-12-02 DIAGNOSIS — D17.1 LIPOMA OF BUTTOCK: ICD-10-CM

## 2019-12-02 PROCEDURE — 93010 ELECTROCARDIOGRAM REPORT: CPT | Performed by: INTERNAL MEDICINE

## 2019-12-02 PROCEDURE — 93005 ELECTROCARDIOGRAM TRACING: CPT

## 2019-12-09 ENCOUNTER — ANESTHESIA EVENT (OUTPATIENT)
Dept: SURGERY | Facility: HOSPITAL | Age: 84
End: 2019-12-09
Payer: MEDICARE

## 2019-12-09 ENCOUNTER — ANESTHESIA (OUTPATIENT)
Dept: SURGERY | Facility: HOSPITAL | Age: 84
End: 2019-12-09
Payer: MEDICARE

## 2019-12-09 ENCOUNTER — HOSPITAL ENCOUNTER (OUTPATIENT)
Facility: HOSPITAL | Age: 84
Setting detail: HOSPITAL OUTPATIENT SURGERY
Discharge: HOME | End: 2019-12-09
Attending: SURGERY | Admitting: SURGERY
Payer: MEDICARE

## 2019-12-09 VITALS
HEART RATE: 76 BPM | TEMPERATURE: 98 F | RESPIRATION RATE: 18 BRPM | HEIGHT: 64 IN | SYSTOLIC BLOOD PRESSURE: 138 MMHG | WEIGHT: 139.31 LBS | BODY MASS INDEX: 23.78 KG/M2 | OXYGEN SATURATION: 96 % | DIASTOLIC BLOOD PRESSURE: 58 MMHG

## 2019-12-09 DIAGNOSIS — D17.1 LIPOMA OF BUTTOCK: Primary | ICD-10-CM

## 2019-12-09 PROCEDURE — 88304 TISSUE EXAM BY PATHOLOGIST: CPT | Performed by: SURGERY

## 2019-12-09 PROCEDURE — 0HB8XZZ EXCISION OF BUTTOCK SKIN, EXTERNAL APPROACH: ICD-10-PCS | Performed by: SURGERY

## 2019-12-09 RX ORDER — SODIUM CHLORIDE, SODIUM LACTATE, POTASSIUM CHLORIDE, CALCIUM CHLORIDE 600; 310; 30; 20 MG/100ML; MG/100ML; MG/100ML; MG/100ML
INJECTION, SOLUTION INTRAVENOUS CONTINUOUS
Status: DISCONTINUED | OUTPATIENT
Start: 2019-12-09 | End: 2019-12-09

## 2019-12-09 RX ORDER — ONDANSETRON 2 MG/ML
4 INJECTION INTRAMUSCULAR; INTRAVENOUS AS NEEDED
Status: DISCONTINUED | OUTPATIENT
Start: 2019-12-09 | End: 2019-12-09

## 2019-12-09 RX ORDER — CEFAZOLIN SODIUM/WATER 2 G/20 ML
SYRINGE (ML) INTRAVENOUS
Status: DISCONTINUED
Start: 2019-12-09 | End: 2019-12-09

## 2019-12-09 RX ORDER — LIDOCAINE HYDROCHLORIDE 10 MG/ML
INJECTION, SOLUTION EPIDURAL; INFILTRATION; INTRACAUDAL; PERINEURAL AS NEEDED
Status: DISCONTINUED | OUTPATIENT
Start: 2019-12-09 | End: 2019-12-09 | Stop reason: SURG

## 2019-12-09 RX ORDER — DIPHENHYDRAMINE HYDROCHLORIDE 50 MG/ML
12.5 INJECTION INTRAMUSCULAR; INTRAVENOUS AS NEEDED
Status: DISCONTINUED | OUTPATIENT
Start: 2019-12-09 | End: 2019-12-09

## 2019-12-09 RX ORDER — HYDROCODONE BITARTRATE AND ACETAMINOPHEN 5; 325 MG/1; MG/1
1-2 TABLET ORAL EVERY 6 HOURS PRN
Qty: 30 TABLET | Refills: 0 | Status: ON HOLD | OUTPATIENT
Start: 2019-12-09 | End: 2020-07-16

## 2019-12-09 RX ORDER — BUPIVACAINE HYDROCHLORIDE AND EPINEPHRINE 5; 5 MG/ML; UG/ML
INJECTION, SOLUTION EPIDURAL; INTRACAUDAL; PERINEURAL AS NEEDED
Status: DISCONTINUED | OUTPATIENT
Start: 2019-12-09 | End: 2019-12-09 | Stop reason: HOSPADM

## 2019-12-09 RX ORDER — METOCLOPRAMIDE HYDROCHLORIDE 5 MG/ML
10 INJECTION INTRAMUSCULAR; INTRAVENOUS AS NEEDED
Status: DISCONTINUED | OUTPATIENT
Start: 2019-12-09 | End: 2019-12-09

## 2019-12-09 RX ORDER — NALOXONE HYDROCHLORIDE 0.4 MG/ML
80 INJECTION, SOLUTION INTRAMUSCULAR; INTRAVENOUS; SUBCUTANEOUS AS NEEDED
Status: DISCONTINUED | OUTPATIENT
Start: 2019-12-09 | End: 2019-12-09

## 2019-12-09 RX ORDER — METOCLOPRAMIDE HYDROCHLORIDE 5 MG/ML
INJECTION INTRAMUSCULAR; INTRAVENOUS AS NEEDED
Status: DISCONTINUED | OUTPATIENT
Start: 2019-12-09 | End: 2019-12-09 | Stop reason: SURG

## 2019-12-09 RX ORDER — ONDANSETRON 2 MG/ML
INJECTION INTRAMUSCULAR; INTRAVENOUS AS NEEDED
Status: DISCONTINUED | OUTPATIENT
Start: 2019-12-09 | End: 2019-12-09 | Stop reason: SURG

## 2019-12-09 RX ORDER — ACETAMINOPHEN 500 MG
1000 TABLET ORAL ONCE
Status: DISCONTINUED | OUTPATIENT
Start: 2019-12-09 | End: 2019-12-09

## 2019-12-09 RX ORDER — HYDROMORPHONE HYDROCHLORIDE 1 MG/ML
0.4 INJECTION, SOLUTION INTRAMUSCULAR; INTRAVENOUS; SUBCUTANEOUS EVERY 5 MIN PRN
Status: DISCONTINUED | OUTPATIENT
Start: 2019-12-09 | End: 2019-12-09

## 2019-12-09 RX ORDER — LABETALOL HYDROCHLORIDE 5 MG/ML
5 INJECTION, SOLUTION INTRAVENOUS EVERY 5 MIN PRN
Status: DISCONTINUED | OUTPATIENT
Start: 2019-12-09 | End: 2019-12-09

## 2019-12-09 RX ORDER — HYDROCODONE BITARTRATE AND ACETAMINOPHEN 5; 325 MG/1; MG/1
2 TABLET ORAL AS NEEDED
Status: DISCONTINUED | OUTPATIENT
Start: 2019-12-09 | End: 2019-12-09

## 2019-12-09 RX ORDER — HYDROCODONE BITARTRATE AND ACETAMINOPHEN 5; 325 MG/1; MG/1
1 TABLET ORAL AS NEEDED
Status: DISCONTINUED | OUTPATIENT
Start: 2019-12-09 | End: 2019-12-09

## 2019-12-09 RX ORDER — CEFAZOLIN SODIUM/WATER 2 G/20 ML
2 SYRINGE (ML) INTRAVENOUS ONCE
Status: COMPLETED | OUTPATIENT
Start: 2019-12-09 | End: 2019-12-09

## 2019-12-09 RX ORDER — ACETAMINOPHEN 500 MG
1000 TABLET ORAL EVERY 6 HOURS PRN
COMMUNITY

## 2019-12-09 RX ORDER — ROCURONIUM BROMIDE 10 MG/ML
INJECTION, SOLUTION INTRAVENOUS AS NEEDED
Status: DISCONTINUED | OUTPATIENT
Start: 2019-12-09 | End: 2019-12-09 | Stop reason: SURG

## 2019-12-09 RX ADMIN — METOCLOPRAMIDE HYDROCHLORIDE 10 MG: 5 INJECTION INTRAMUSCULAR; INTRAVENOUS at 17:24:00

## 2019-12-09 RX ADMIN — ROCURONIUM BROMIDE 20 MG: 10 INJECTION, SOLUTION INTRAVENOUS at 17:08:00

## 2019-12-09 RX ADMIN — CEFAZOLIN SODIUM/WATER 2 G: 2 G/20 ML SYRINGE (ML) INTRAVENOUS at 17:06:00

## 2019-12-09 RX ADMIN — SODIUM CHLORIDE, SODIUM LACTATE, POTASSIUM CHLORIDE, CALCIUM CHLORIDE: 600; 310; 30; 20 INJECTION, SOLUTION INTRAVENOUS at 16:54:00

## 2019-12-09 RX ADMIN — LIDOCAINE HYDROCHLORIDE 50 MG: 10 INJECTION, SOLUTION EPIDURAL; INFILTRATION; INTRACAUDAL; PERINEURAL at 17:01:00

## 2019-12-09 RX ADMIN — ONDANSETRON 4 MG: 2 INJECTION INTRAMUSCULAR; INTRAVENOUS at 17:24:00

## 2019-12-09 NOTE — BRIEF OP NOTE
Pre-Operative Diagnosis: Lipoma of buttock [D17.1]     Post-Operative Diagnosis: Lipoma of buttock [D17.1]      Procedure Performed:   Procedure(s):  EXCISION GLUTEAL CLEFT MASS    Surgeon(s) and Role:     Dwight Dixon MD - Primary    Assistant(s):  Surg

## 2019-12-09 NOTE — ANESTHESIA POSTPROCEDURE EVALUATION
[address] [place] [address] Patient Status:  Hospital Outpatient Surgery   Age/Gender 80year old female MRN [id number]   Location [address] Attending Shyanne Salcido MD   Hosp Day # 0 PCP Kallie Ahumada MD       A

## 2019-12-09 NOTE — H&P
HPI:     Avel Lima is a 80year old female who presents for evaluation of pilonidal cyst. Over the past 2 years the patient has noted a mass overlying her gluteal crease. This will increase in size and cause discomfort. No drainage or bleeding.  Barbara Model          Family History   Problem Relation Age of Onset   • Other (Other) Father           MS   • Heart Disorder Brother 78         Myocardial infarction         Social History    Tobacco Use      Smoking status: Former Smoker        Packs/day: 0

## 2019-12-09 NOTE — ANESTHESIA PREPROCEDURE EVALUATION
PRE-OP EVALUATION    Patient Name: Iris Cortes    Pre-op Diagnosis: Lipoma of buttock [D17.1]    Procedure(s):  EXCISION GLUTEAL CLEFT MASS    Surgeon(s) and Role:     Heriberto Babinski, MD - Primary    Pre-op vitals reviewed.   Temp: 98.2 °F (36.8 °C) (+) arthritis       Pulmonary        (+) COPD and mild  COPD not requiring home oxygen. (-) shortness of breath  (-) recent URI          Neuro/Psych    Negative neuro/psych ROS.                                 Past Surgical History:   P (L) 10/14/2019    RDW 14.4 10/14/2019     10/14/2019               Airway      Mallampati: II  Mouth opening: 3 FB  TM distance: 4 - 6 cm  Neck ROM: full Cardiovascular    Cardiovascular exam normal.  Rhythm: regular  Rate: normal  (-) murmur   Dent

## 2019-12-09 NOTE — ANESTHESIA PROCEDURE NOTES
Airway  Date/Time: 12/9/2019 5:01 PM  Urgency: elective    Airway not difficult    General Information and Staff    Patient location during procedure: OR  Anesthesiologist: Nadir Nolasco MD  Performed: anesthesiologist     Indications and Patient Condi

## 2019-12-10 NOTE — OPERATIVE REPORT
[facility]    PATIENT'S NAME: Esteearlette Gandara   ATTENDING PHYSICIAN: Tonja Young M.D. OPERATING PHYSICIAN: Tonja Young M.D.    PATIENT ACCOUNT#:   [de-identified]    LOCATION:  [address] [place] [address] 10  MEDICAL RECORD #:   [id number]       DATE

## 2020-01-21 ENCOUNTER — CARDPULM VISIT (OUTPATIENT)
Dept: CARDIAC REHAB | Facility: HOSPITAL | Age: 85
End: 2020-01-21
Attending: INTERNAL MEDICINE

## 2020-03-05 ENCOUNTER — CARDPULM VISIT (OUTPATIENT)
Dept: CARDIAC REHAB | Facility: HOSPITAL | Age: 85
End: 2020-03-05
Attending: INTERNAL MEDICINE

## 2020-07-15 ENCOUNTER — APPOINTMENT (OUTPATIENT)
Dept: CT IMAGING | Facility: HOSPITAL | Age: 85
End: 2020-07-15
Attending: EMERGENCY MEDICINE
Payer: MEDICARE

## 2020-07-15 ENCOUNTER — HOSPITAL ENCOUNTER (OUTPATIENT)
Facility: HOSPITAL | Age: 85
Setting detail: OBSERVATION
Discharge: HOME | End: 2020-07-16
Attending: EMERGENCY MEDICINE | Admitting: INTERNAL MEDICINE
Payer: MEDICARE

## 2020-07-15 DIAGNOSIS — M54.50 ACUTE MIDLINE LOW BACK PAIN WITHOUT SCIATICA: Primary | ICD-10-CM
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
## 2020-07-15 DIAGNOSIS — R26.2 INABILITY TO AMBULATE DUE TO MULTIPLE JOINTS: ICD-10-CM

## 2020-07-15 LAB
ALBUMIN SERPL-MCNC: 3.1 G/DL (ref 3.4–5)
ALBUMIN/GLOB SERPL: 0.7 {RATIO} (ref 1–2)
ALP LIVER SERPL-CCNC: 88 U/L (ref 55–142)
ALT SERPL-CCNC: 30 U/L (ref 13–56)
ANION GAP SERPL CALC-SCNC: 1 MMOL/L (ref 0–18)
ANION GAP SERPL CALC-SCNC: 4 MMOL/L (ref 0–18)
AST SERPL-CCNC: 34 U/L (ref 15–37)
ATRIAL RATE: 79 BPM
BASOPHILS # BLD AUTO: 0.11 X10(3) UL (ref 0–0.2)
BASOPHILS # BLD AUTO: 0.14 X10(3) UL (ref 0–0.2)
BASOPHILS NFR BLD AUTO: 0.8 %
BASOPHILS NFR BLD AUTO: 1.3 %
BILIRUB SERPL-MCNC: 0.3 MG/DL (ref 0.1–2)
BILIRUB UR QL STRIP.AUTO: NEGATIVE
BUN BLD-MCNC: 7 MG/DL (ref 7–18)
BUN BLD-MCNC: 7 MG/DL (ref 7–18)
BUN/CREAT SERPL: 10.4 (ref 10–20)
BUN/CREAT SERPL: 8.5 (ref 10–20)
CALCIUM BLD-MCNC: 8.3 MG/DL (ref 8.5–10.1)
CALCIUM BLD-MCNC: 8.3 MG/DL (ref 8.5–10.1)
CHLORIDE SERPL-SCNC: 107 MMOL/L (ref 98–112)
CHLORIDE SERPL-SCNC: 108 MMOL/L (ref 98–112)
CO2 SERPL-SCNC: 30 MMOL/L (ref 21–32)
CO2 SERPL-SCNC: 30 MMOL/L (ref 21–32)
COLOR UR AUTO: YELLOW
CREAT BLD-MCNC: 0.67 MG/DL (ref 0.55–1.02)
CREAT BLD-MCNC: 0.82 MG/DL (ref 0.55–1.02)
DEPRECATED RDW RBC AUTO: 44.5 FL (ref 35.1–46.3)
DEPRECATED RDW RBC AUTO: 45.6 FL (ref 35.1–46.3)
EOSINOPHIL # BLD AUTO: 0.19 X10(3) UL (ref 0–0.7)
EOSINOPHIL # BLD AUTO: 0.29 X10(3) UL (ref 0–0.7)
EOSINOPHIL NFR BLD AUTO: 1.4 %
EOSINOPHIL NFR BLD AUTO: 2.6 %
ERYTHROCYTE [DISTWIDTH] IN BLOOD BY AUTOMATED COUNT: 14.7 % (ref 11–15)
ERYTHROCYTE [DISTWIDTH] IN BLOOD BY AUTOMATED COUNT: 14.7 % (ref 11–15)
GLOBULIN PLAS-MCNC: 4.3 G/DL (ref 2.8–4.4)
GLUCOSE BLD-MCNC: 114 MG/DL (ref 70–99)
GLUCOSE BLD-MCNC: 115 MG/DL (ref 70–99)
GLUCOSE UR STRIP.AUTO-MCNC: NEGATIVE MG/DL
HAV IGM SER QL: 1.9 MG/DL (ref 1.6–2.6)
HCT VFR BLD AUTO: 39.9 % (ref 35–48)
HCT VFR BLD AUTO: 40.1 % (ref 35–48)
HGB BLD-MCNC: 12.1 G/DL (ref 12–16)
HGB BLD-MCNC: 12.4 G/DL (ref 12–16)
IMM GRANULOCYTES # BLD AUTO: 0.07 X10(3) UL (ref 0–1)
IMM GRANULOCYTES # BLD AUTO: 0.16 X10(3) UL (ref 0–1)
IMM GRANULOCYTES NFR BLD: 0.5 %
IMM GRANULOCYTES NFR BLD: 1.4 %
KETONES UR STRIP.AUTO-MCNC: NEGATIVE MG/DL
LYMPHOCYTES # BLD AUTO: 1.77 X10(3) UL (ref 1–4)
LYMPHOCYTES # BLD AUTO: 2.22 X10(3) UL (ref 1–4)
LYMPHOCYTES NFR BLD AUTO: 13.4 %
LYMPHOCYTES NFR BLD AUTO: 19.9 %
M PROTEIN MFR SERPL ELPH: 7.4 G/DL (ref 6.4–8.2)
MCH RBC QN AUTO: 25.8 PG (ref 26–34)
MCH RBC QN AUTO: 25.9 PG (ref 26–34)
MCHC RBC AUTO-ENTMCNC: 30.3 G/DL (ref 31–37)
MCHC RBC AUTO-ENTMCNC: 30.9 G/DL (ref 31–37)
MCV RBC AUTO: 83.5 FL (ref 80–100)
MCV RBC AUTO: 85.3 FL (ref 80–100)
MONOCYTES # BLD AUTO: 0.7 X10(3) UL (ref 0.1–1)
MONOCYTES # BLD AUTO: 0.86 X10(3) UL (ref 0.1–1)
MONOCYTES NFR BLD AUTO: 6.3 %
MONOCYTES NFR BLD AUTO: 6.5 %
NEUTROPHILS # BLD AUTO: 10.16 X10 (3) UL (ref 1.5–7.7)
NEUTROPHILS # BLD AUTO: 10.16 X10(3) UL (ref 1.5–7.7)
NEUTROPHILS # BLD AUTO: 7.63 X10 (3) UL (ref 1.5–7.7)
NEUTROPHILS # BLD AUTO: 7.63 X10(3) UL (ref 1.5–7.7)
NEUTROPHILS NFR BLD AUTO: 68.5 %
NEUTROPHILS NFR BLD AUTO: 77.4 %
NITRITE UR QL STRIP.AUTO: NEGATIVE
OSMOLALITY SERPL CALC.SUM OF ELEC: 287 MOSM/KG (ref 275–295)
OSMOLALITY SERPL CALC.SUM OF ELEC: 291 MOSM/KG (ref 275–295)
P AXIS: 56 DEGREES
P-R INTERVAL: 162 MS
PH UR STRIP.AUTO: 5 [PH] (ref 4.5–8)
PLATELET # BLD AUTO: 236 10(3)UL (ref 150–450)
PLATELET # BLD AUTO: 243 10(3)UL (ref 150–450)
POTASSIUM SERPL-SCNC: 3.3 MMOL/L (ref 3.5–5.1)
POTASSIUM SERPL-SCNC: 3.8 MMOL/L (ref 3.5–5.1)
PROT UR STRIP.AUTO-MCNC: NEGATIVE MG/DL
Q-T INTERVAL: 448 MS
QRS DURATION: 146 MS
QTC CALCULATION (BEZET): 513 MS
R AXIS: -10 DEGREES
RBC # BLD AUTO: 4.68 X10(6)UL (ref 3.8–5.3)
RBC # BLD AUTO: 4.8 X10(6)UL (ref 3.8–5.3)
RBC UR QL AUTO: NEGATIVE
SARS-COV-2 RNA RESP QL NAA+PROBE: NOT DETECTED
SODIUM SERPL-SCNC: 139 MMOL/L (ref 136–145)
SODIUM SERPL-SCNC: 141 MMOL/L (ref 136–145)
SP GR UR STRIP.AUTO: 1.03 (ref 1–1.03)
T AXIS: 105 DEGREES
UROBILINOGEN UR STRIP.AUTO-MCNC: <2 MG/DL
VENTRICULAR RATE: 79 BPM
WBC # BLD AUTO: 11.1 X10(3) UL (ref 4–11)
WBC # BLD AUTO: 13.2 X10(3) UL (ref 4–11)

## 2020-07-15 PROCEDURE — 36415 COLL VENOUS BLD VENIPUNCTURE: CPT | Performed by: HOSPITALIST

## 2020-07-15 PROCEDURE — 97161 PT EVAL LOW COMPLEX 20 MIN: CPT | Performed by: PHYSICAL THERAPIST

## 2020-07-15 PROCEDURE — 99285 EMERGENCY DEPT VISIT HI MDM: CPT

## 2020-07-15 PROCEDURE — 97530 THERAPEUTIC ACTIVITIES: CPT | Performed by: PHYSICAL THERAPIST

## 2020-07-15 PROCEDURE — 97530 THERAPEUTIC ACTIVITIES: CPT

## 2020-07-15 PROCEDURE — 93005 ELECTROCARDIOGRAM TRACING: CPT

## 2020-07-15 PROCEDURE — 97535 SELF CARE MNGMENT TRAINING: CPT

## 2020-07-15 PROCEDURE — 96374 THER/PROPH/DIAG INJ IV PUSH: CPT

## 2020-07-15 PROCEDURE — 85025 COMPLETE CBC W/AUTO DIFF WBC: CPT | Performed by: HOSPITALIST

## 2020-07-15 PROCEDURE — 80053 COMPREHEN METABOLIC PANEL: CPT | Performed by: EMERGENCY MEDICINE

## 2020-07-15 PROCEDURE — 96372 THER/PROPH/DIAG INJ SC/IM: CPT

## 2020-07-15 PROCEDURE — 97165 OT EVAL LOW COMPLEX 30 MIN: CPT

## 2020-07-15 PROCEDURE — 93010 ELECTROCARDIOGRAM REPORT: CPT

## 2020-07-15 PROCEDURE — 70450 CT HEAD/BRAIN W/O DYE: CPT | Performed by: EMERGENCY MEDICINE

## 2020-07-15 PROCEDURE — 81001 URINALYSIS AUTO W/SCOPE: CPT | Performed by: HOSPITALIST

## 2020-07-15 PROCEDURE — 83735 ASSAY OF MAGNESIUM: CPT | Performed by: HOSPITALIST

## 2020-07-15 PROCEDURE — 72131 CT LUMBAR SPINE W/O DYE: CPT | Performed by: EMERGENCY MEDICINE

## 2020-07-15 PROCEDURE — 85025 COMPLETE CBC W/AUTO DIFF WBC: CPT | Performed by: EMERGENCY MEDICINE

## 2020-07-15 PROCEDURE — 97116 GAIT TRAINING THERAPY: CPT | Performed by: PHYSICAL THERAPIST

## 2020-07-15 RX ORDER — ACETAMINOPHEN 500 MG
1000 TABLET ORAL 3 TIMES DAILY
Status: DISCONTINUED | OUTPATIENT
Start: 2020-07-15 | End: 2020-07-16

## 2020-07-15 RX ORDER — CALCIUM CARBONATE/VITAMIN D3 250-3.125
2 TABLET ORAL 2 TIMES DAILY
Status: DISCONTINUED | OUTPATIENT
Start: 2020-07-15 | End: 2020-07-16

## 2020-07-15 RX ORDER — ONDANSETRON 2 MG/ML
4 INJECTION INTRAMUSCULAR; INTRAVENOUS EVERY 6 HOURS PRN
Status: DISCONTINUED | OUTPATIENT
Start: 2020-07-15 | End: 2020-07-16

## 2020-07-15 RX ORDER — MIRTAZAPINE 7.5 MG/1
7.5 TABLET, FILM COATED ORAL NIGHTLY
Status: DISCONTINUED | OUTPATIENT
Start: 2020-07-15 | End: 2020-07-16

## 2020-07-15 RX ORDER — METOPROLOL SUCCINATE 25 MG/1
25 TABLET, EXTENDED RELEASE ORAL DAILY
Status: DISCONTINUED | OUTPATIENT
Start: 2020-07-15 | End: 2020-07-16

## 2020-07-15 RX ORDER — POLYETHYLENE GLYCOL 3350 17 G/17G
17 POWDER, FOR SOLUTION ORAL DAILY PRN
Status: DISCONTINUED | OUTPATIENT
Start: 2020-07-15 | End: 2020-07-16

## 2020-07-15 RX ORDER — ACETAMINOPHEN 325 MG/1
650 TABLET ORAL EVERY 6 HOURS PRN
Status: DISCONTINUED | OUTPATIENT
Start: 2020-07-15 | End: 2020-07-16

## 2020-07-15 RX ORDER — AMLODIPINE BESYLATE 5 MG/1
5 TABLET ORAL DAILY
Status: DISCONTINUED | OUTPATIENT
Start: 2020-07-15 | End: 2020-07-16

## 2020-07-15 RX ORDER — DOCUSATE SODIUM 100 MG/1
100 CAPSULE, LIQUID FILLED ORAL 2 TIMES DAILY
Status: DISCONTINUED | OUTPATIENT
Start: 2020-07-15 | End: 2020-07-16

## 2020-07-15 RX ORDER — POTASSIUM CHLORIDE 20 MEQ/1
40 TABLET, EXTENDED RELEASE ORAL ONCE
Status: COMPLETED | OUTPATIENT
Start: 2020-07-15 | End: 2020-07-15

## 2020-07-15 RX ORDER — TRAMADOL HYDROCHLORIDE 50 MG/1
50 TABLET ORAL EVERY 6 HOURS PRN
Status: DISCONTINUED | OUTPATIENT
Start: 2020-07-15 | End: 2020-07-15

## 2020-07-15 RX ORDER — MORPHINE SULFATE 4 MG/ML
2 INJECTION, SOLUTION INTRAMUSCULAR; INTRAVENOUS ONCE
Status: COMPLETED | OUTPATIENT
Start: 2020-07-15 | End: 2020-07-15

## 2020-07-15 RX ORDER — BISACODYL 10 MG
10 SUPPOSITORY, RECTAL RECTAL
Status: DISCONTINUED | OUTPATIENT
Start: 2020-07-15 | End: 2020-07-16

## 2020-07-15 RX ORDER — HEPARIN SODIUM 5000 [USP'U]/ML
5000 INJECTION, SOLUTION INTRAVENOUS; SUBCUTANEOUS EVERY 12 HOURS SCHEDULED
Status: DISCONTINUED | OUTPATIENT
Start: 2020-07-15 | End: 2020-07-16

## 2020-07-15 NOTE — PLAN OF CARE
Patient alert ox4 here with back pain after fall at home. On O2 2L nc. Uses O2 at home at night. Unable to ambulate d/t the pain. Voided per bedpan. Plan of care reviewed. Bed alarm on. Call light within reach.

## 2020-07-15 NOTE — HOME CARE LIAISON
TNL met with ptnt at bedside after speaking to ptnt dtr Sindi about [facility] on dc. Dtr stated her mother is able to make her own decisions about HH on dc and I should met with her. TNL met with ptnt and she declined HH on dc.  Rishi Squires CM aware    Thanks

## 2020-07-15 NOTE — ED INITIAL ASSESSMENT (HPI)
Pt arrived via ems, called for fall from bed, hurting back.  Denies head/neck injury and denies blood thinners

## 2020-07-15 NOTE — OCCUPATIONAL THERAPY NOTE
OCCUPATIONAL THERAPY EVALUATION - INPATIENT     Room Number: 375/375-A  Evaluation Date: 7/15/2020  Type of Evaluation: Initial  Presenting Problem: (back pain)    Physician Order: IP Consult to Occupational Therapy  Reason for Therapy: ADL/IADL Dysfunctio Dr. Mary Alice Myles   • COLONOSCOPY  07/06/2004    Diverticulosis, no polyps   • COLONOSCOPY     • CYST/MASS EXCISION N/A 12/9/2019    Performed by Jermain Coles MD at [facility] MAIN OR   • ENDOSCOPIC RETROGRADE CHOLANGIOPANCREATOGRAPHY (ERCP) N/A 11/26/2019    Perform promotion;Relaxation;Repositioning    COGNITION  Overall Cognitive Status:  WFL - within functional limits    VISION  Current Vision: no visual deficits    PERCEPTION  Overall Perception Status:   WFL - within functional limits    SENSATION  Light touch: session/findings; All patient questions and concerns addressed    ASSESSMENT     Patient is a 80year old female admitted on 7/15/2020 for back pain. Complete medical history and occupational profile noted above.  Functional outcome measures completed includ Good  Frequency (Obs): 3-5x/week  Number of Visits to Meet Established Goals: 2    ADL Goals   Patient will perform grooming: with modified independent  Patient will perform upper body dressing:  with modified independent  Patient will perform lower body d

## 2020-07-15 NOTE — ED PROVIDER NOTES
Patient Seen in: [facility] Emergency Department      History   Patient presents with:  Trauma  Back Pain    Stated Complaint: fall, pain to mid and lower back    HPI    Patient is a 20-year-old female comes emergency room for evaluation of a fall w OR   • ENDOSCOPIC RETROGRADE CHOLANGIOPANCREATOGRAPHY (ERCP) N/A 11/26/2019    Performed by Gabriel Alejandre MD at [address] [place] [address] (ERCP) N/A 8/28/2019    Performed by Gabriel Alejandre MD at [facility] ENDOSCOP well-appearing. HEENT: Normocephalic, atraumatic. Moist mucous membranes. Pupils equal round reactive to light accommodation, extraocular motion is intact, sclerae white, conjunctiva is pink.   Oropharynx is unremarkable, no exudate, dentition intact, no -----------         ------                     CBC W/ DIFFERENTIAL[[id number]]          Abnormal            Final result                 Please view results for these tests on the individual orders.    RAPID SARS-COV-2 BY PCR   R

## 2020-07-15 NOTE — H&P
[facility] hospitalist H+P  PCP Gordo Larsen MD  CC  Back pain  HPi 81 yo female with multiple medical problems including but not limited to A-fib, COPD, CHF, HTn, HL, scoliosis came due to back pain.  Last night pt fell asleep in her chair, she was  getting COLONOSCOPY  07/06/2004    Diverticulosis, no polyps   • COLONOSCOPY     • CYST/MASS EXCISION N/A 12/9/2019    Performed by Nico Steiner MD at [address] (ERCP) N/A 11/26/2019    Performed by Magdalena Avalos per week        Frequency: Monthly or less        Drinks per session: 1 or 2        Binge frequency: Never        Comment: rare      Drug use: No      Sexual activity: Not on file    Lifestyle      Physical activity:        Days per week: Not on file place, month, year, no respiratory distress  HEENT; mmm, anicteric, EOMI  Neck supple  Lymph no tender cervical LAD  CV: RRR, nl S1/S2  Pulm: CTA b/l, no wheezing  Abd; soft, not tender, +BS  Ext: no calf tenderness b/l  Vascular + b/l radial and DP pulses

## 2020-07-15 NOTE — CM/SW NOTE
07/15/20 1100   CM/SW Referral Data   Referral Source Social Work (self-referral)   Reason for Referral Discharge planning   Informant Children   Patient Info   Patient's Mental Status Alert;Oriented   Patient's [address]   Number of Levels

## 2020-07-15 NOTE — PLAN OF CARE
Pt A&ox4. On ra needing 3L o2 when sleeping, uses o2 at noc when at home. Up with min assist walker & gait belt, participating in therapy. Pain controlled on pain meds. Plan home with home health when ready. Will continue to monitor.     1515 Pt requesting

## 2020-07-16 VITALS
SYSTOLIC BLOOD PRESSURE: 132 MMHG | HEIGHT: 65 IN | RESPIRATION RATE: 17 BRPM | OXYGEN SATURATION: 94 % | TEMPERATURE: 98 F | DIASTOLIC BLOOD PRESSURE: 57 MMHG | HEART RATE: 75 BPM | WEIGHT: 140 LBS | BODY MASS INDEX: 23.32 KG/M2

## 2020-07-16 LAB — POTASSIUM SERPL-SCNC: 4.4 MMOL/L (ref 3.5–5.1)

## 2020-07-16 PROCEDURE — 97530 THERAPEUTIC ACTIVITIES: CPT | Performed by: PHYSICAL THERAPIST

## 2020-07-16 PROCEDURE — 96372 THER/PROPH/DIAG INJ SC/IM: CPT

## 2020-07-16 PROCEDURE — 84132 ASSAY OF SERUM POTASSIUM: CPT | Performed by: HOSPITALIST

## 2020-07-16 PROCEDURE — 97116 GAIT TRAINING THERAPY: CPT | Performed by: PHYSICAL THERAPIST

## 2020-07-16 NOTE — PLAN OF CARE
Pt A & O x4, on RA. /IS. SCDs. Heparin SQ. Regular diet. Pt wears 3L O2 at noc, pt does state she wears it with activity at times. Last Bm 7/14. INcontinent at times, wearing a brief. Up with min assist/walker. PT. Lidoderm patch to lower back.   Bhavna

## 2020-07-16 NOTE — PHYSICAL THERAPY NOTE
PHYSICAL THERAPY TREATMENT NOTE - INPATIENT    Room Number: 375/375-A     Session: 1/3   Number of Visits to Meet Established Goals: 3    Presenting Problem: (s/p fall onto back, LBP )    Problem List  Principal Problem:    Acute midline low back pain wit 8/28/2019    Performed by Soraida Issa MD at [address] [place] [address] (ERCP) N/A 8/14/2019    Performed by Soraida Issa MD at [facility] ENDOSCOPY   • ERCP - INTERNAL  11/2019    stent removal; no stones or leak bed to a chair (including a wheelchair)?: None   -   Need to walk in hospital room?: A Little   -   Climbing 3-5 steps with a railing?: A Little       AM-PAC Score:  Raw Score: 22   Approx Degree of Impairment: 20.91%   Standardized Score (AM-PAC Scale): 5 transfers, energy conservation and general strengthening exercise to improve her endurance to allow her to climb her 14 stairs without becoming SOB. Pt. Has declined the recommendation. DISCHARGE RECOMMENDATIONS  PT Discharge Recommendations: Home; Edgardo Azar

## 2020-07-16 NOTE — PHYSICAL THERAPY NOTE
PHYSICAL THERAPY EVALUATION - INPATIENT     Room Number: 375/375-A  Evaluation Date: 7/15/2020  Type of Evaluation: Initial  Physician Order: PT Eval and Treat    Presenting Problem: (s/p fall onto back, LBP )  Reason for Therapy: Mobility Dysfunctio impairment     bilateral hearing aids   • Heart attack ([facility] [address]    • High blood pressure    • High cholesterol    • History of stomach ulcers    • Lumbosacral spondylosis without myelopathy    • Osteoporosis    • Other and unspecified hyperlipidemia    • Panc to climb the stairs at home ,and uses a Rolator at home . Able to care for self. SUBJECTIVE  My back really hurt last nite, and the patch that the nurse put on me has really helped. Patient self-stated goal is to go home when I am ready.       OBJ AM-PAC Score:  Raw Score: 19   Approx Degree of Impairment: 41.77%   Standardized Score (AM-PAC Scale): 45.44   CMS Modifier (G-Code): CK    FUNCTIONAL ABILITY STATUS  Gait Assessment   Gait Assistance: Contact guard assist;Minimum assistance  Distan Exposure to medical diagnostic radiation, Hearing impairment, Heart attack ([facility] [address] High blood pressure, High cholesterol, History of stomach ulcers, Lumbosacral spondylosis without myelopathy, Osteoporosis, Other and unspecified hyperlipidemia, Pancreatitis GOALS    Goal #1 Patient is able to demonstrate supine - sit EOB @ level: modified independent     Goal #2 Patient is able to demonstrate transfers Sit to/from Stand at assistance level: modified independent     Goal #3 Patient is able to ambulate 200 feet

## 2020-07-16 NOTE — DISCHARGE SUMMARY
General Medicine Discharge Summary     Patient ID:  Hilton Cortes  80year old  4/27/1930    Admit date: 7/15/2020    Discharge date and time: 7/16/2020    Attending Physician: Davis Whitley DO CHANGED    MIRTAZAPINE 7.5 MG Oral Tab  TAKE 1 TABLET BY MOUTH NIGHTLY    acetaminophen 500 MG Oral Tab  Take 1,000 mg by mouth every 6 (six) hours as needed for Pain. AmLODIPine Besylate 5 MG Oral Tab  Take 5 mg by mouth daily.     Metoprolol Succinate

## 2020-07-16 NOTE — PLAN OF CARE
A&Ox4. [place]. Ambulating to bathroom with min assist/walker. Denies N/T. VSS. On O2 when asleep. Fall precautions in place. Will continue to monitor.

## 2020-07-16 NOTE — CM/SW NOTE
07/16/20 1100   Discharge disposition   Expected discharge disposition Home or Self   Discharge transportation Private car   Pt declined recommended home health services.

## 2020-07-26 ENCOUNTER — HOSPITAL ENCOUNTER (OUTPATIENT)
Facility: HOSPITAL | Age: 85
Setting detail: OBSERVATION
Discharge: NURSING, REHAB OR LONG-TERM CARE | End: 2020-07-30
Attending: EMERGENCY MEDICINE | Admitting: HOSPITALIST
Payer: MEDICARE

## 2020-07-26 ENCOUNTER — APPOINTMENT (OUTPATIENT)
Dept: GENERAL RADIOLOGY | Facility: HOSPITAL | Age: 85
End: 2020-07-26
Attending: EMERGENCY MEDICINE
Payer: MEDICARE

## 2020-07-26 ENCOUNTER — APPOINTMENT (OUTPATIENT)
Dept: GENERAL RADIOLOGY | Facility: HOSPITAL | Age: 85
End: 2020-07-26
Attending: HOSPITALIST
Payer: MEDICARE

## 2020-07-26 DIAGNOSIS — M54.9 INTRACTABLE BACK PAIN: Primary | ICD-10-CM

## 2020-07-26 LAB
ALBUMIN SERPL-MCNC: 3.5 G/DL (ref 3.4–5)
ALBUMIN/GLOB SERPL: 0.7 {RATIO} (ref 1–2)
ALP LIVER SERPL-CCNC: 112 U/L (ref 55–142)
ALT SERPL-CCNC: 17 U/L (ref 13–56)
ANION GAP SERPL CALC-SCNC: 3 MMOL/L (ref 0–18)
AST SERPL-CCNC: 20 U/L (ref 15–37)
BASOPHILS # BLD AUTO: 0.12 X10(3) UL (ref 0–0.2)
BASOPHILS NFR BLD AUTO: 1 %
BILIRUB SERPL-MCNC: 0.5 MG/DL (ref 0.1–2)
BILIRUB UR QL STRIP.AUTO: NEGATIVE
BILIRUB UR QL STRIP.AUTO: NEGATIVE
BUN BLD-MCNC: 17 MG/DL (ref 7–18)
BUN/CREAT SERPL: 19.5 (ref 10–20)
CALCIUM BLD-MCNC: 9.1 MG/DL (ref 8.5–10.1)
CHLORIDE SERPL-SCNC: 107 MMOL/L (ref 98–112)
CLARITY UR REFRACT.AUTO: CLEAR
CO2 SERPL-SCNC: 28 MMOL/L (ref 21–32)
COLOR UR AUTO: YELLOW
COLOR UR AUTO: YELLOW
CREAT BLD-MCNC: 0.87 MG/DL (ref 0.55–1.02)
DEPRECATED RDW RBC AUTO: 45.3 FL (ref 35.1–46.3)
EOSINOPHIL # BLD AUTO: 0.18 X10(3) UL (ref 0–0.7)
EOSINOPHIL NFR BLD AUTO: 1.5 %
ERYTHROCYTE [DISTWIDTH] IN BLOOD BY AUTOMATED COUNT: 14.8 % (ref 11–15)
GLOBULIN PLAS-MCNC: 4.7 G/DL (ref 2.8–4.4)
GLUCOSE BLD-MCNC: 114 MG/DL (ref 70–99)
GLUCOSE UR STRIP.AUTO-MCNC: NEGATIVE MG/DL
GLUCOSE UR STRIP.AUTO-MCNC: NEGATIVE MG/DL
HCT VFR BLD AUTO: 43.2 % (ref 35–48)
HGB BLD-MCNC: 13.2 G/DL (ref 12–16)
IMM GRANULOCYTES # BLD AUTO: 0.04 X10(3) UL (ref 0–1)
IMM GRANULOCYTES NFR BLD: 0.3 %
KETONES UR STRIP.AUTO-MCNC: NEGATIVE MG/DL
KETONES UR STRIP.AUTO-MCNC: NEGATIVE MG/DL
LEUKOCYTE ESTERASE UR QL STRIP.AUTO: NEGATIVE
LYMPHOCYTES # BLD AUTO: 1.36 X10(3) UL (ref 1–4)
LYMPHOCYTES NFR BLD AUTO: 11.7 %
M PROTEIN MFR SERPL ELPH: 8.2 G/DL (ref 6.4–8.2)
MCH RBC QN AUTO: 25.9 PG (ref 26–34)
MCHC RBC AUTO-ENTMCNC: 30.6 G/DL (ref 31–37)
MCV RBC AUTO: 84.7 FL (ref 80–100)
MONOCYTES # BLD AUTO: 0.69 X10(3) UL (ref 0.1–1)
MONOCYTES NFR BLD AUTO: 5.9 %
NEUTROPHILS # BLD AUTO: 9.27 X10 (3) UL (ref 1.5–7.7)
NEUTROPHILS # BLD AUTO: 9.27 X10(3) UL (ref 1.5–7.7)
NEUTROPHILS NFR BLD AUTO: 79.6 %
NITRITE UR QL STRIP.AUTO: NEGATIVE
NITRITE UR QL STRIP.AUTO: NEGATIVE
OSMOLALITY SERPL CALC.SUM OF ELEC: 288 MOSM/KG (ref 275–295)
PH UR STRIP.AUTO: 5 [PH] (ref 4.5–8)
PH UR STRIP.AUTO: 5 [PH] (ref 4.5–8)
PLATELET # BLD AUTO: 341 10(3)UL (ref 150–450)
POTASSIUM SERPL-SCNC: 3.7 MMOL/L (ref 3.5–5.1)
PROT UR STRIP.AUTO-MCNC: NEGATIVE MG/DL
PROT UR STRIP.AUTO-MCNC: NEGATIVE MG/DL
RBC # BLD AUTO: 5.1 X10(6)UL (ref 3.8–5.3)
RBC UR QL AUTO: NEGATIVE
SARS-COV-2 RNA RESP QL NAA+PROBE: NOT DETECTED
SODIUM SERPL-SCNC: 138 MMOL/L (ref 136–145)
SP GR UR STRIP.AUTO: 1.03 (ref 1–1.03)
SP GR UR STRIP.AUTO: 1.03 (ref 1–1.03)
UROBILINOGEN UR STRIP.AUTO-MCNC: <2 MG/DL
UROBILINOGEN UR STRIP.AUTO-MCNC: <2 MG/DL
WBC # BLD AUTO: 11.7 X10(3) UL (ref 4–11)

## 2020-07-26 PROCEDURE — 74018 RADEX ABDOMEN 1 VIEW: CPT | Performed by: EMERGENCY MEDICINE

## 2020-07-26 PROCEDURE — 72100 X-RAY EXAM L-S SPINE 2/3 VWS: CPT | Performed by: HOSPITALIST

## 2020-07-26 RX ORDER — CALCIUM CARBONATE/VITAMIN D3 250-3.125
2 TABLET ORAL 2 TIMES DAILY
Status: DISCONTINUED | OUTPATIENT
Start: 2020-07-26 | End: 2020-07-30

## 2020-07-26 RX ORDER — KETOROLAC TROMETHAMINE 15 MG/ML
15 INJECTION, SOLUTION INTRAMUSCULAR; INTRAVENOUS EVERY 6 HOURS PRN
Status: DISCONTINUED | OUTPATIENT
Start: 2020-07-26 | End: 2020-07-28

## 2020-07-26 RX ORDER — ACETAMINOPHEN 325 MG/1
650 TABLET ORAL EVERY 6 HOURS
Status: DISCONTINUED | OUTPATIENT
Start: 2020-07-26 | End: 2020-07-29

## 2020-07-26 RX ORDER — METOPROLOL SUCCINATE 25 MG/1
25 TABLET, EXTENDED RELEASE ORAL DAILY
Status: DISCONTINUED | OUTPATIENT
Start: 2020-07-27 | End: 2020-07-30

## 2020-07-26 RX ORDER — AMLODIPINE BESYLATE 5 MG/1
5 TABLET ORAL DAILY
Status: DISCONTINUED | OUTPATIENT
Start: 2020-07-27 | End: 2020-07-26

## 2020-07-26 RX ORDER — DOCUSATE SODIUM 100 MG/1
100 CAPSULE, LIQUID FILLED ORAL 2 TIMES DAILY
Status: DISCONTINUED | OUTPATIENT
Start: 2020-07-26 | End: 2020-07-30

## 2020-07-26 RX ORDER — KETOROLAC TROMETHAMINE 30 MG/ML
30 INJECTION, SOLUTION INTRAMUSCULAR; INTRAVENOUS ONCE
Status: COMPLETED | OUTPATIENT
Start: 2020-07-26 | End: 2020-07-26

## 2020-07-26 RX ORDER — HEPARIN SODIUM 5000 [USP'U]/ML
5000 INJECTION, SOLUTION INTRAVENOUS; SUBCUTANEOUS EVERY 8 HOURS SCHEDULED
Status: DISCONTINUED | OUTPATIENT
Start: 2020-07-26 | End: 2020-07-27

## 2020-07-26 RX ORDER — AMLODIPINE BESYLATE 5 MG/1
5 TABLET ORAL NIGHTLY
Status: DISCONTINUED | OUTPATIENT
Start: 2020-07-26 | End: 2020-07-30

## 2020-07-26 NOTE — H&P
[facility] hosptalist H+P  PCP Tomer Malagon MD  CC  HPI80 yo female with multiple medical problems including but not limited to A-fib, COPD, CHF, HTn, HL, scoliosis came due to back pain.   A  few weeks ago patient had a fall after getting up from her lazy bruna Procedure Laterality Date   • APPENDECTOMY     • CATARACT     • CHOLECYSTECTOMY  08/2019    Dr. Rachael Funk   • COLONOSCOPY  07/06/2004    Diverticulosis, no polyps   • COLONOSCOPY     • CYST/MASS EXCISION N/A 12/9/2019    Performed by Angle Jaramillo MD at and Sexual Activity      Alcohol use:  Yes        Alcohol/week: 1.0 standard drinks        Types: 1 Glasses of wine per week        Frequency: Monthly or less        Drinks per session: 1 or 2        Binge frequency: Never        Comment: rare      Drug use Calcium Carbonate-Vitamin D 600-400 MG-UNIT Oral Tab, Take 1 tablet by mouth 2 (two) times daily. , Disp: , Rfl:       ROS 10 systems reviewed and negative except as in HPI  PE    07/26/20  1645   BP: 133/77   Pulse: 73   Resp: 16   Temp:      Gen: awake, [facility]  [phone]

## 2020-07-26 NOTE — ED NOTES
DAUGHTER PRESENTS WITH BACLOFEN THAT WAS PRESCRIBED ON 7/23, WAS GIVING TO PT AS A MUSCLE RELAXER, ONE DOSE PER DAY

## 2020-07-26 NOTE — PROGRESS NOTES
NURSING ADMISSION NOTE      Patient admitted via Cart  Oriented to room. Safety precautions initiated. Bed in low position. Call light in reach. Pt AOx3. VSS. Pain 8/10, better since \"12/10\" in ER. Admission navigator completed.  Med rec complet

## 2020-07-26 NOTE — ED NOTES
Pt crying, upset about COVID testing, shows concern she may be positive ans has been around her grandchildren, PT reassured she will be notified as soon as results are available.  PT is thankful,

## 2020-07-26 NOTE — ED NOTES
REPORT GIVEN TO AKIRA ORTIZ , TRANSPORT PAGED, PT AWARE OF NEGATIVE COVID STATUS AND EXPRESSES MUCH RELIEF.

## 2020-07-26 NOTE — ED PROVIDER NOTES
Patient Seen in: [facility] Emergency Department      History   Patient presents with:  Back Pain    Stated Complaint: back pain     HPI    42-year-old female presents for evaluation of persistent back pain.   Patient lost her balance 2 weeks ago aft L/NC at [facility]   • Unspecified essential hypertension    • Visual impairment               Past Surgical History:   Procedure Laterality Date   • APPENDECTOMY     • CATARACT     • CHOLECYSTECTOMY  08/2019    Dr. Bin Mckeon   • COLONOSCOPY  07/06/2004    Lucille /44   Pulse 69   Resp 20   Temp 99 °F (37.2 °C)   Temp src Temporal   SpO2 93 %   O2 Device None (Room air)       Current:/60   Pulse 68   Temp 99 °F (37.2 °C) (Temporal)   Resp 18   Ht 162.6 cm (5' 4\")   Wt 52.2 kg   SpO2 98%   BMI 19.74 kg Report.   Rate: 67  Rhythm: Sinus Rhythm  Reading: Left bundle branch block pattern, similar to July 15, 2020                 [facility]     Medications   ketorolac tromethamine (TORADOL) 30 MG/ML injection 30 mg (30 mg Intravenous Given 7/26/20 1518)     Xr Abdom

## 2020-07-26 NOTE — ED INITIAL ASSESSMENT (HPI)
PT TO ED BY EMS FROM HOME WITH C/O CHRONIC LOWER BACK PAIN, + DIARRHEA,, PT STATES BURNING WITH URINATION YESTERDAY

## 2020-07-27 ENCOUNTER — APPOINTMENT (OUTPATIENT)
Dept: MRI IMAGING | Facility: HOSPITAL | Age: 85
End: 2020-07-27
Attending: HOSPITALIST
Payer: MEDICARE

## 2020-07-27 LAB
ANION GAP SERPL CALC-SCNC: 1 MMOL/L (ref 0–18)
ATRIAL RATE: 67 BPM
BASOPHILS # BLD AUTO: 0.14 X10(3) UL (ref 0–0.2)
BASOPHILS NFR BLD AUTO: 1.6 %
BUN BLD-MCNC: 19 MG/DL (ref 7–18)
BUN/CREAT SERPL: 26.8 (ref 10–20)
CALCIUM BLD-MCNC: 9 MG/DL (ref 8.5–10.1)
CHLORIDE SERPL-SCNC: 108 MMOL/L (ref 98–112)
CO2 SERPL-SCNC: 30 MMOL/L (ref 21–32)
CREAT BLD-MCNC: 0.71 MG/DL (ref 0.55–1.02)
DEPRECATED RDW RBC AUTO: 45.1 FL (ref 35.1–46.3)
EOSINOPHIL # BLD AUTO: 0.24 X10(3) UL (ref 0–0.7)
EOSINOPHIL NFR BLD AUTO: 2.8 %
ERYTHROCYTE [DISTWIDTH] IN BLOOD BY AUTOMATED COUNT: 14.8 % (ref 11–15)
GLUCOSE BLD-MCNC: 107 MG/DL (ref 70–99)
HCT VFR BLD AUTO: 38.9 % (ref 35–48)
HGB BLD-MCNC: 12 G/DL (ref 12–16)
IMM GRANULOCYTES # BLD AUTO: 0.02 X10(3) UL (ref 0–1)
IMM GRANULOCYTES NFR BLD: 0.2 %
LYMPHOCYTES # BLD AUTO: 1.62 X10(3) UL (ref 1–4)
LYMPHOCYTES NFR BLD AUTO: 18.7 %
MCH RBC QN AUTO: 26.2 PG (ref 26–34)
MCHC RBC AUTO-ENTMCNC: 30.8 G/DL (ref 31–37)
MCV RBC AUTO: 84.9 FL (ref 80–100)
MONOCYTES # BLD AUTO: 0.56 X10(3) UL (ref 0.1–1)
MONOCYTES NFR BLD AUTO: 6.5 %
NEUTROPHILS # BLD AUTO: 6.09 X10 (3) UL (ref 1.5–7.7)
NEUTROPHILS # BLD AUTO: 6.09 X10(3) UL (ref 1.5–7.7)
NEUTROPHILS NFR BLD AUTO: 70.2 %
OSMOLALITY SERPL CALC.SUM OF ELEC: 291 MOSM/KG (ref 275–295)
P AXIS: 41 DEGREES
P-R INTERVAL: 176 MS
PLATELET # BLD AUTO: 306 10(3)UL (ref 150–450)
POTASSIUM SERPL-SCNC: 3.8 MMOL/L (ref 3.5–5.1)
Q-T INTERVAL: 460 MS
QRS DURATION: 148 MS
QTC CALCULATION (BEZET): 486 MS
R AXIS: -15 DEGREES
RBC # BLD AUTO: 4.58 X10(6)UL (ref 3.8–5.3)
SODIUM SERPL-SCNC: 139 MMOL/L (ref 136–145)
T AXIS: 126 DEGREES
VENTRICULAR RATE: 67 BPM
WBC # BLD AUTO: 8.7 X10(3) UL (ref 4–11)

## 2020-07-27 PROCEDURE — 72148 MRI LUMBAR SPINE W/O DYE: CPT | Performed by: HOSPITALIST

## 2020-07-27 PROCEDURE — 72146 MRI CHEST SPINE W/O DYE: CPT | Performed by: HOSPITALIST

## 2020-07-27 RX ORDER — POTASSIUM CHLORIDE 20 MEQ/1
40 TABLET, EXTENDED RELEASE ORAL ONCE
Status: COMPLETED | OUTPATIENT
Start: 2020-07-27 | End: 2020-07-27

## 2020-07-27 RX ORDER — METHOCARBAMOL 500 MG/1
250 TABLET, FILM COATED ORAL 3 TIMES DAILY PRN
Status: DISCONTINUED | OUTPATIENT
Start: 2020-07-27 | End: 2020-07-30

## 2020-07-27 NOTE — PHYSICAL THERAPY NOTE
PHYSICAL THERAPY EVALUATION - INPATIENT     Room Number: 420/420-A  Evaluation Date: 7/27/2020  Type of Evaluation: Initial  Physician Order: PT Eval and Treat    Presenting Problem: Intractable LBP  Reason for Therapy: Mobility Dysfunction and Disch hyperlipidemia    • Pancreatitis    • Personal history of antineoplastic chemotherapy     last tx 1975   • Pneumonia due to organism    • Shortness of breath     2 L/NC at HS   • Unspecified essential hypertension    • Visual impairment        Past Surgica not helpful; another dtr assists her intermittently throughout the week c showering, however she is indep c dressing    SUBJECTIVE  \"My back just hurts so much\"    Patient self-stated goal is decr pn    OBJECTIVE  Precautions: Spine;Bed/chair alarm;Hard person does the patient currently need. ..   -   Moving to and from a bed to a chair (including a wheelchair)?: A Little   -   Need to walk in hospital room?: A Little   -   Climbing 3-5 steps with a railing?: A Little       AM-PAC Score:  Raw Score: 18   A posture, endurance, strength, gait and balance. Functional outcome measures completed include The AM-PAC '6-Clicks' Inpatient Basic Mobility Short Form was completed and this patient is demonstrating a 46.58% degree of impairment in mobility.  Based on thi

## 2020-07-27 NOTE — PROGRESS NOTES
[facility] hospitalist daily note  Seen/examined on 7/27/20    S pt reports persistent back pain  No fever  No new numbness/tingling  Denies loss of bowel/bladder control    Medications in Epic    PE    07/27/20  1210   BP: 122/51   Pulse: 72   Resp: 18   Temp: 9 deficit  Monitor        Constipation possibly related to pain med pt was on prior to admit  Docusate/dulcolax ordered     Leukocytosis; resolved  pt is afebrile  Lungs clear  UA without bacteria seen.  Urine culture pending       COPD no wheezing     Hx of

## 2020-07-27 NOTE — CM/SW NOTE
07/27/20 1400   CM/SW Referral Data   Referral Source Physician   Reason for Referral Discharge planning   Informant Children   Social History   Recreational Drug/Alcohol Use no   Major Changes Last 6 Months no   Domestic/Partner Violence no   Suicidal

## 2020-07-27 NOTE — PLAN OF CARE
Pt alert and oriented x4, tearful. VSS, afebrile. C/o back pain, scheduled tylenol given per MAR. Denies SOB or nausea. Pt wearing 2L O2 per baseline, spo2 within normal limits. Pt up to the commode with assist. Fall precautions in place.  Call light within

## 2020-07-28 LAB — POTASSIUM SERPL-SCNC: 4.4 MMOL/L (ref 3.5–5.1)

## 2020-07-28 PROCEDURE — 99203 OFFICE O/P NEW LOW 30 MIN: CPT | Performed by: ANESTHESIOLOGY

## 2020-07-28 RX ORDER — HEPARIN SODIUM 5000 [USP'U]/ML
5000 INJECTION, SOLUTION INTRAVENOUS; SUBCUTANEOUS EVERY 8 HOURS SCHEDULED
Status: COMPLETED | OUTPATIENT
Start: 2020-07-28 | End: 2020-07-28

## 2020-07-28 NOTE — PLAN OF CARE
Pt alert and oriented, forgetful at times. VSS, afebrile. Upon assuming care at [address] [place] [address] pt moaning out in pain. Throughout the night pt comfortable with scheduled tylenol, pt not requesting any additional pain medications and resting comfortably in bed.  Kayla Barkley pain using appropriate pain scale  - Administer analgesics based on type and severity of pain and evaluate response  - Implement non-pharmacological measures as appropriate and evaluate response  - Consider cultural and social influences on pain and pain m

## 2020-07-28 NOTE — PHYSICAL THERAPY NOTE
Attempted to see pt. With OT co treat. Per [employer] Rn pt. Has a T`11 fracture  and is recommending bed rest until she has kyphoplasty tomorrow, 7/29/20. Brace ordered for post procedure .

## 2020-07-28 NOTE — CM/SW NOTE
MSW reviewed Aidin. The patient has been accepted at [facility]  A:[phone]  Y:[phone]. MSW reserved this provider. SW will continue to follow.     Cherise Martinez, LCSW

## 2020-07-28 NOTE — OCCUPATIONAL THERAPY NOTE
Attempted to see for OT evaluation. Per Juanis Lindo pain Rn, pt. Has a T11 fracture  and is recommending bed rest until she has kyphoplasty tomorrow, 7/29/20. Brace ordered for post procedure Will f/u for evaluation when appropriate.

## 2020-07-28 NOTE — PROGRESS NOTES
+ compression fracture seen-- pt may benefit from going to pain clinic for vertebroplasty if her pain is not well controlled

## 2020-07-28 NOTE — PLAN OF CARE
A&O times 4. Complaints of back pain d/t fracture. MRI ordered. Robaxin given per mar with some relief. Up with assist, steady gait noted. Fall precautions in place. Call light within reach, will monitor.     Problem: Impaired Functional Mobility  [facility]

## 2020-07-28 NOTE — PROGRESS NOTES
[facility]  Report of Consultation    Crista Paniagua Caminithya Patient Status:  Observation    1930 MRN [id number]   [facility] 4NW-A Attending Skip Moore MD   Hosp Day # 0 PCP Beatris Ohara MD     Date of Admission:  2020 • Pancreatitis    • Personal history of antineoplastic chemotherapy     last tx 1975   • Pneumonia due to organism    • Shortness of breath     2 L/NC at HS   • Unspecified essential hypertension    • Visual impairment      Past Surgical History:   Proce UNIT/ML injection 5,000 Units, 5,000 Units, Subcutaneous, Q8H [address]  •  methocarbamol (ROBAXIN) tab 250 mg, 250 mg, Oral, TID PRN  •  Calcium Carbonate-Vitamin D 250-125 MG-UNIT per tab TABS 2 tablet, 2 tablet, Oral, BID  •  lidocaine-menthol 4-1 % 1 patch, There is mild right foraminal stenosis. PARASPINAL AREA:  Normal with no visible mass. BONY STRUCTURES:  There is an acute compression fracture of T11. There is levo scoliotic deformity of the thoracolumbar spine.   CORD/CAUDA EQUINA:  Normal calibe

## 2020-07-28 NOTE — PROGRESS NOTES
Per Epic pt is currently admitted to [facility]. Pt has IR KYPHOPLASTY ordered as inpatient.

## 2020-07-28 NOTE — PLAN OF CARE
A&O times 4. Pt tearful, in lots of pain. Repositioned frequently. Pain meds given per mar with some relief. Pain service on case, will see this afternoon. Up with assist to bathroom and chair with walker, does well but very painful.   One BM this gayla

## 2020-07-28 NOTE — PROGRESS NOTES
[facility] hospitalist daily note  Seen/examined on 7/28/20     S still  Persistent lower back pain  No fever  No new numbness/tingling  Denies loss of bowel/bladder control  Had BM     Medications in Epic     PE    07/28/20  1133   BP: 123/61   Pulse: 69   Resp: ordered  Robaxin PRN ordered (smaller dose due to age)  toradol PRn ordered (creatinine 0.81)  Fall precautions  PT eval  Pain service consult, possible kyphoplasty     Altered mental status prior to admit now resolved  Likely was related to pain med and o

## 2020-07-29 ENCOUNTER — ANESTHESIA (OUTPATIENT)
Dept: INTERVENTIONAL RADIOLOGY/VASCULAR | Facility: HOSPITAL | Age: 85
End: 2020-07-29
Payer: MEDICARE

## 2020-07-29 ENCOUNTER — APPOINTMENT (OUTPATIENT)
Dept: INTERVENTIONAL RADIOLOGY/VASCULAR | Facility: HOSPITAL | Age: 85
End: 2020-07-29
Attending: ANESTHESIOLOGY
Payer: MEDICARE

## 2020-07-29 PROCEDURE — 22513 PERQ VERTEBRAL AUGMENTATION: CPT | Performed by: ANESTHESIOLOGY

## 2020-07-29 PROCEDURE — 0PS43ZZ REPOSITION THORACIC VERTEBRA, PERCUTANEOUS APPROACH: ICD-10-PCS | Performed by: ANESTHESIOLOGY

## 2020-07-29 PROCEDURE — 0PU43JZ SUPPLEMENT THORACIC VERTEBRA WITH SYNTHETIC SUBSTITUTE, PERCUTANEOUS APPROACH: ICD-10-PCS | Performed by: ANESTHESIOLOGY

## 2020-07-29 RX ORDER — SODIUM CHLORIDE 9 MG/ML
INJECTION, SOLUTION INTRAVENOUS CONTINUOUS PRN
Status: DISCONTINUED | OUTPATIENT
Start: 2020-07-29 | End: 2020-07-29 | Stop reason: SURG

## 2020-07-29 RX ORDER — HYDROMORPHONE HYDROCHLORIDE 1 MG/ML
0.4 INJECTION, SOLUTION INTRAMUSCULAR; INTRAVENOUS; SUBCUTANEOUS EVERY 5 MIN PRN
Status: DISCONTINUED | OUTPATIENT
Start: 2020-07-29 | End: 2020-07-29 | Stop reason: HOSPADM

## 2020-07-29 RX ORDER — SODIUM CHLORIDE, SODIUM LACTATE, POTASSIUM CHLORIDE, CALCIUM CHLORIDE 600; 310; 30; 20 MG/100ML; MG/100ML; MG/100ML; MG/100ML
INJECTION, SOLUTION INTRAVENOUS CONTINUOUS
Status: DISCONTINUED | OUTPATIENT
Start: 2020-07-29 | End: 2020-07-29

## 2020-07-29 RX ORDER — NALOXONE HYDROCHLORIDE 0.4 MG/ML
80 INJECTION, SOLUTION INTRAMUSCULAR; INTRAVENOUS; SUBCUTANEOUS AS NEEDED
Status: DISCONTINUED | OUTPATIENT
Start: 2020-07-29 | End: 2020-07-29 | Stop reason: HOSPADM

## 2020-07-29 RX ORDER — CEFAZOLIN SODIUM/WATER 2 G/20 ML
SYRINGE (ML) INTRAVENOUS AS NEEDED
Status: DISCONTINUED | OUTPATIENT
Start: 2020-07-29 | End: 2020-07-29 | Stop reason: SURG

## 2020-07-29 RX ORDER — ONDANSETRON 2 MG/ML
4 INJECTION INTRAMUSCULAR; INTRAVENOUS AS NEEDED
Status: DISCONTINUED | OUTPATIENT
Start: 2020-07-29 | End: 2020-07-29 | Stop reason: HOSPADM

## 2020-07-29 RX ORDER — CEFAZOLIN SODIUM/WATER 2 G/20 ML
SYRINGE (ML) INTRAVENOUS
Status: COMPLETED
Start: 2020-07-29 | End: 2020-07-29

## 2020-07-29 RX ORDER — ACETAMINOPHEN 10 MG/ML
1000 INJECTION, SOLUTION INTRAVENOUS EVERY 6 HOURS PRN
Status: DISCONTINUED | OUTPATIENT
Start: 2020-07-29 | End: 2020-07-30

## 2020-07-29 RX ADMIN — SODIUM CHLORIDE: 9 INJECTION, SOLUTION INTRAVENOUS at 08:54:00

## 2020-07-29 RX ADMIN — CEFAZOLIN SODIUM/WATER 2 G: 2 G/20 ML SYRINGE (ML) INTRAVENOUS at 09:10:00

## 2020-07-29 RX ADMIN — SODIUM CHLORIDE: 9 INJECTION, SOLUTION INTRAVENOUS at 09:40:00

## 2020-07-29 NOTE — PLAN OF CARE
Assumed care at [address] pt on bed, on O2 2L/NC  Back pain, pain med given. Warm pack applied  NPO  Up in the [facility] wit assist and walker  Plan:Kyphoplasty, consent signed    Problem: Patient/Family Goals  Goal: Patient/Family Long Term Goal  Description  P Progressing     Problem: PAIN - ADULT  Goal: Verbalizes/displays adequate comfort level or patient's stated pain goal  Description  INTERVENTIONS:  - Encourage pt to monitor pain and request assistance  - Assess pain using appropriate pain scale  - Adminis appropriate  - Assess patient's ability to be responsible for managing their own health  - Refer to Case Management Department for coordinating discharge planning if the patient needs post-hospital services based on physician/LIP order or complex needs rel

## 2020-07-29 NOTE — PROGRESS NOTES
Pt went for her kypoplasty today. Dressing dry and tegaderm is in place. She is alert oriented, her 02 was put back to 2 L NC sats 96%. Cardiac diet. Pt repositioned in bed for comfort, Iv tylenol given to pt for pain. Pt stated it helped.  Will continue

## 2020-07-29 NOTE — ANESTHESIA POSTPROCEDURE EVALUATION
[address] [facility] Jesus Cortes Patient Status:  Observation   Age/Gender 80year old female MRN [id number]   [facility] 4NW-A Attending Sheryle Bjork, MD   [facility] Day # 0 PCP Esdras Barnes MD       Anesthesia Post-op Note    * No pro

## 2020-07-29 NOTE — PROGRESS NOTES
Pt returned from her procedure, she is very drowsy, her o2 was put up to 3.5 L, sats 88%, now encouraging deep breathing , sats 91%. Will keep close eye on pt.

## 2020-07-29 NOTE — OCCUPATIONAL THERAPY NOTE
OCCUPATIONAL THERAPY EVALUATION - INPATIENT     Room Number: 420/420-A  Evaluation Date: 7/29/2020  Type of Evaluation: Initial  Presenting Problem: compression fx s/p kyphoplasty    Physician Order: IP Consult to Occupational Therapy  Reason for Therapy: RETROGRADE CHOLANGIOPANCREATOGRAPHY (ERCP) N/A 11/26/2019    Performed by Jimmy Hernandez MD at [address] [place] [address] (ERCP) N/A 8/28/2019    Performed by Jimmy Hernandez MD at [facility] ENDOSCOPY   • ENDOSCOPIC R changes    PERCEPTION  Overall Perception Status:   WFL - within functional limits    SENSATION  Light touch:  intact    Communication: hard of hearing    Behavioral/Emotional/Social: wfl    RANGE OF MOTION AND STRENGTH ASSESSMENT  Upper extremity ROM is w addressed    ASSESSMENT     Patient is a 80year old female admitted on 7/26/2020 for fall, compression fracture, kyphoplasty. Complete medical history and occupational profile noted above. Functional outcome measures completed include AMPAC.  The AM-IMTIAZ ' eval/education; Compensatory technique education  Rehab Potential : Good  Frequency (Obs): 3-5x/week  Number of Visits to Meet Established Goals: 5    ADL Goals   Patient will perform lower body dressing:  with supervision and with adaptive equipment PRN  P

## 2020-07-29 NOTE — PROGRESS NOTES
[facility] hospitalist daily note  Seen/examined on 7/29/20     S s/p kyphoplasty today, still  has lower back pain, denies numbness/tingling  Per pt urinating and passing gas  No chest pain, no SOB, no fever  Denies loss of bowel/bladder control  Pt is aware cynthia (do not exceed 4 grams acetaminophen within 24 hours)  Lidocaine patch ordered  Robaxin PRN ordered (smaller dose due to age)  toradol PRn ordered (creatinine 0.81)  Fall precautions  PT eval  Pain service consulted, kyphoplasty on 7/29/20  Will change Tyl

## 2020-07-29 NOTE — ANESTHESIA PREPROCEDURE EVALUATION
PRE-OP EVALUATION    Patient Name: Caren Cortes    Pre-op Diagnosis: T11 vertebral compression fracture  T11 kyphoplasty    T11 kyphoplasty    Pre-op vitals reviewed.   Temp: 98.2 °F (36.8 °C)  Pulse: 69  Resp: 18  BP: 130/59  SpO2: 96 %  Body mass i Take 1 tablet by mouth 2 (two) times daily. , Disp: , Rfl:         Allergies: Ciprofloxacin; Doxycycline      Anesthesia Evaluation    Patient summary reviewed.     Anesthetic Complications  (+) history of anesthetic complications (Patient unsure, EMR reflec right leg   • HYSTERECTOMY  1970    Oophorectomy, for endometrial CA   • LAPAROSCOPIC CHOLECYSTECTOMY N/A 8/26/2019    Performed by Jermain Coles MD at [facility] MAIN OR   • OTHER  HIP INJURY    orif Right hip    • UPPER GI ENDOSCOPY,EXAM       Social History    T

## 2020-07-30 VITALS
HEIGHT: 64 IN | TEMPERATURE: 98 F | SYSTOLIC BLOOD PRESSURE: 105 MMHG | WEIGHT: 115 LBS | HEART RATE: 81 BPM | DIASTOLIC BLOOD PRESSURE: 87 MMHG | RESPIRATION RATE: 18 BRPM | OXYGEN SATURATION: 92 % | BODY MASS INDEX: 19.63 KG/M2

## 2020-07-30 RX ORDER — ACETAMINOPHEN 500 MG
1000 TABLET ORAL EVERY 6 HOURS PRN
Status: DISCONTINUED | OUTPATIENT
Start: 2020-07-30 | End: 2020-07-30

## 2020-07-30 RX ORDER — PSEUDOEPHEDRINE HCL 30 MG
100 TABLET ORAL 2 TIMES DAILY PRN
Qty: 30 CAPSULE | Refills: 0 | Status: SHIPPED | OUTPATIENT
Start: 2020-07-30

## 2020-07-30 RX ORDER — ACETAMINOPHEN 500 MG
500 TABLET ORAL EVERY 6 HOURS PRN
Status: DISCONTINUED | OUTPATIENT
Start: 2020-07-30 | End: 2020-07-30

## 2020-07-30 NOTE — PROCEDURES
[facility]  Operative Report  2020     Amanda Cortes Patient Status:  Observation    1930 MRN [id number]   [facility] 4NW-A Attending Dre Dangelo MD   Hosp Day # 0 PCP Bang Tavarez MD     Indication: Chance Navas i the ll-gauge needle to a 0.3 mm from the anterior cortex. AP and lateral images were taken to verify position and trajectory. Alongside of the guide pin a 0.5 cm paramedian incision was made. The needle was then removed leaving the guidepin in place.  The o Dermabond. The patient was kept in the prone position for approximately 10 minutes post cement injection. The patient was then turned supine, monitored briefly and returned to the floor. The patient was moving both his lower extremities were  this time.

## 2020-07-30 NOTE — PLAN OF CARE
Problem: MUSCULOSKELETAL - ADULT  Goal: Return mobility to safest level of function  Description  INTERVENTIONS:  - Assess patient stability and activity tolerance for standing, transferring and ambulating w/ or w/o assistive devices  - Assist with trans 1600. MD and SW spoke to pt's dtr. 1700 Report called to receiving RN Owen Welsh. 1800 Pt. Taken for Dc via w/c. All belongings sent w/ pt.

## 2020-07-30 NOTE — CM/SW NOTE
07/30/20 1416   Discharge disposition   Expected discharge disposition Skilled Nurs   Name of [address]   Discharge transportation Jaime Issa     Pt cleared to [place] to [facility] today.   RN to call report to [phone]

## 2020-07-30 NOTE — DISCHARGE SUMMARY
General Medicine Discharge Summary     Patient ID:  Carlota Cortes  80year old  4/27/1930    Admit date: 7/26/2020    Discharge date and time: 7/30/20    Attending Physician: Armin Hill MD     Primary Care Physician: Alexia Tong MD     Re Disposition: SNF    Patient Instructions:   Current Discharge Medication List    START taking these medications    docusate sodium 100 MG Oral Cap  Take 100 mg by mouth 2 (two) times daily as needed for constipation.       CONTINUE these medications whi

## 2020-07-30 NOTE — PLAN OF CARE
Problem: Impaired Functional Mobility  Goal: Achieve highest/safest level of mobility/gait  Description  Interventions:  - Assess patient's functional ability and stability  - Promote increasing activity/tolerance for mobility and gait  - Educate and eng increased pain with activity and pre-medicate as appropriate  Outcome: Progressing     Problem: Impaired Activities of Daily Living  Goal: Achieve highest/safest level of independence in self care  Description  Interventions:  - Assess ability and encourag

## 2020-07-30 NOTE — OCCUPATIONAL THERAPY NOTE
OCCUPATIONAL THERAPY TREATMENT NOTE - INPATIENT     Room Number: 420/420-A  Session: 1   Number of Visits to Meet Established Goals: 5    Presenting Problem: compression fx s/p kyphoplasty    History related to current admission: Pt admitted 7/26/2020 for • ENDOSCOPIC RETROGRADE CHOLANGIOPANCREATOGRAPHY (ERCP) N/A 8/28/2019    Performed by Lyndon Lopez MD at [address] [place] [address] (ERCP) N/A 8/14/2019    Performed by Lyndon Lopez MD at [facility] ENDOSCOPY Therapy Provided:   PPE: Surgical mask and gloves worn. Pt seen semi supine. Min (A) to EOB via log roll. Educated on brace management. Max (A) to don EOB. Dons B slip on shoes with min (A) for heel management. Overall max (A) LB dressing.    Sit to s toilet:  with supervision     Additional Goals:  Pt will don/doff chair back brace with supervision  Pt will state 3/3 spinal precautions

## 2020-07-31 ENCOUNTER — INITIAL APN SNF VISIT (OUTPATIENT)
Dept: INTERNAL MEDICINE CLINIC | Age: 85
End: 2020-07-31

## 2020-07-31 DIAGNOSIS — I10 ESSENTIAL HYPERTENSION: ICD-10-CM

## 2020-07-31 DIAGNOSIS — R53.1 WEAKNESS: ICD-10-CM

## 2020-07-31 DIAGNOSIS — Z98.890 S/P KYPHOPLASTY: Primary | ICD-10-CM

## 2020-07-31 DIAGNOSIS — Z91.81 HISTORY OF FALLING: ICD-10-CM

## 2020-07-31 PROCEDURE — 99310 SBSQ NF CARE HIGH MDM 45: CPT | Performed by: NURSE PRACTITIONER

## 2020-07-31 PROCEDURE — 1111F DSCHRG MED/CURRENT MED MERGE: CPT | Performed by: NURSE PRACTITIONER

## 2020-08-03 ENCOUNTER — SNF VISIT (OUTPATIENT)
Dept: INTERNAL MEDICINE CLINIC | Age: 85
End: 2020-08-03

## 2020-08-03 VITALS
DIASTOLIC BLOOD PRESSURE: 54 MMHG | TEMPERATURE: 97 F | SYSTOLIC BLOOD PRESSURE: 111 MMHG | HEART RATE: 71 BPM | RESPIRATION RATE: 16 BRPM | OXYGEN SATURATION: 94 %

## 2020-08-03 VITALS
HEART RATE: 81 BPM | RESPIRATION RATE: 20 BRPM | SYSTOLIC BLOOD PRESSURE: 110 MMHG | TEMPERATURE: 99 F | DIASTOLIC BLOOD PRESSURE: 60 MMHG | OXYGEN SATURATION: 94 %

## 2020-08-03 DIAGNOSIS — Z91.81 HISTORY OF FALLING: ICD-10-CM

## 2020-08-03 DIAGNOSIS — I10 ESSENTIAL HYPERTENSION: ICD-10-CM

## 2020-08-03 DIAGNOSIS — Z78.9 ON SUPPLEMENTAL OXYGEN BY NASAL CANNULA: ICD-10-CM

## 2020-08-03 DIAGNOSIS — Z98.890 S/P KYPHOPLASTY: Primary | ICD-10-CM

## 2020-08-03 PROCEDURE — 99308 SBSQ NF CARE LOW MDM 20: CPT | Performed by: NURSE PRACTITIONER

## 2020-08-03 NOTE — PROGRESS NOTES
vAel Minium  : 1930  Age 80year old  female patient is admitted to Facility: [address] [place] [address] [address]idence [address] [place] [address] date:  20  Discharge date to [facility]:  20  ELOS:  12-14 days  Anticipated discharge date:   stomach ulcers    • Lumbosacral spondylosis without myelopathy    • Osteoporosis    • Other and unspecified hyperlipidemia    • Pancreatitis    • Personal history of antineoplastic chemotherapy     last tx 1975   • Pneumonia due to organism    • Shortness Frequency: Monthly or less      Drinks per session: 1 or 2      Binge frequency: Never      Comment: rare    Drug use: No      ALLERGIES:    Ciprofloxacin           HIVES  Doxycycline             NAUSEA AND VOMITING    CODE STATUS:  DNR    [address]ED [address]ARE [address] distress; resting in the recliner  LINES, TUBES, DRAINS:  none  SKIN: no rashes, no suspicious lesions  EYES: conjunctiva normal, no drainage from eyes  HENT: normocephalic; normal nose, no nasal drainage, mucous membranes pink, moist  RESPIRATORY:clear to 0.14 07/27/2020       [facility] medical records, notes, lab and imaging results reviewed. Medication reconciliation completed.         Assessment and Plan:  Physical Deconditioning/Weakness; hx of falling  Fall precautions  PT/OT/ST to evaluate and treat  EVENS

## 2020-08-05 NOTE — CONSULTS
Name: Darin Rucker   : 1930   DOS: 2020     Chief complaint: Lower thoracic and upper lumbar pain    History of present illness:  Darin Rucker is a 80year old female was admitted for lower thoracic and upper lumbar pain after a Visual impairment       Current Outpatient Medications   Medication Sig Dispense Refill   • docusate sodium 100 MG Oral Cap Take 100 mg by mouth 2 (two) times daily as needed for constipation.  30 capsule 0   • lidocaine-menthol 4-1 % External Patch Place 1 Smoker        Packs/day: 0.06        Years: 60.00        Pack years: 3.6        Start date: 1952        Quit date: 2004        Years since quittin.5      Smokeless tobacco: Never Used    Alcohol use:  Yes      Alcohol/week: 1.0 standard drinks Extremity: Supple. No thyromegaly or lymphadenopathy. No tenderness over the cervical paravertebral muscle and trapezius muscle. Flexion, extension and and lateral rotation of the spine doesn't make any difference in the pain. Spurling’s test is negative. Reflexes:             (R)   (L)   Patella:    2   2   Ankle:    1   1  Pathological Reflexes:              (R)   (L)   Babinski:               N                          N   Dunlap:    N    N   Ankle Clonus:    N                          N  Sensory Examin

## 2020-08-11 ENCOUNTER — OFFICE VISIT (OUTPATIENT)
Dept: PAIN CLINIC | Facility: CLINIC | Age: 85
End: 2020-08-11
Payer: MEDICARE

## 2020-08-11 VITALS — WEIGHT: 140 LBS | HEIGHT: 63 IN | BODY MASS INDEX: 24.8 KG/M2

## 2020-08-11 DIAGNOSIS — M80.00XA OSTEOPOROSIS WITH PATHOLOGICAL FRACTURE, INITIAL ENCOUNTER: Primary | ICD-10-CM

## 2020-08-11 PROCEDURE — 99214 OFFICE O/P EST MOD 30 MIN: CPT | Performed by: ANESTHESIOLOGY

## 2020-08-11 NOTE — PROGRESS NOTES
Name: Mervin Mcmahon   : 1930   DOS: 2020     Pain Clinic Follow Up Visit:   Mervin Mcmahon is a 80year old female who presents for recheck of her mid thoracic pain. She is status post kyphoplasty at T11 level.   Her pain is 90% bet seen by bone clinic to prevent further fracture. Orders:No orders of the defined types were placed in this encounter.       Medications filled today:  Requested Prescriptions      No prescriptions requested or ordered in this encounter       Radiology or

## 2020-08-11 NOTE — PROGRESS NOTES
Last procedure: S/P kyphoplasty   Date: 08/03/2020    Percentage of relief obtained: 90%    Duration of relief: 90%

## 2020-08-12 ENCOUNTER — SNF DISCHARGE (OUTPATIENT)
Dept: INTERNAL MEDICINE CLINIC | Age: 85
End: 2020-08-12

## 2020-08-12 DIAGNOSIS — Z91.81 HISTORY OF FALLING: ICD-10-CM

## 2020-08-12 DIAGNOSIS — Z98.890 S/P KYPHOPLASTY: Primary | ICD-10-CM

## 2020-08-12 DIAGNOSIS — I10 ESSENTIAL HYPERTENSION: ICD-10-CM

## 2020-08-12 PROCEDURE — 99315 NF DSCHRG MGMT 30 MIN/LESS: CPT | Performed by: NURSE PRACTITIONER

## 2020-08-16 VITALS
RESPIRATION RATE: 18 BRPM | TEMPERATURE: 98 F | HEART RATE: 84 BPM | OXYGEN SATURATION: 97 % | DIASTOLIC BLOOD PRESSURE: 60 MMHG | SYSTOLIC BLOOD PRESSURE: 118 MMHG

## 2020-08-17 NOTE — PROGRESS NOTES
Hugo Dos Santos, 4/27/1930, 80year old, female is [facility]    Date of Admission: 7/30/20  Date of Discharge: 8/14/20                            Admitting Diagnoses:  kyphoplasty  Re medication management, PT/OT/ST to evaluate and treat, CNA/bath aid,  and DME as recommended by rehab therapy staff.     · Prescriptions provided; refills as per PCP/specialists    Discharge Diagnoses w/ current management:  Physical Deconditio

## 2020-11-18 ENCOUNTER — LAB ENCOUNTER (OUTPATIENT)
Dept: LAB | Facility: HOSPITAL | Age: 85
End: 2020-11-18
Attending: INTERNAL MEDICINE
Payer: MEDICARE

## 2020-11-18 DIAGNOSIS — E78.2 MIXED HYPERLIPIDEMIA: Primary | ICD-10-CM

## 2020-11-18 DIAGNOSIS — Z79.899 NEED FOR PROPHYLACTIC CHEMOTHERAPY: ICD-10-CM

## 2020-11-18 PROCEDURE — 36415 COLL VENOUS BLD VENIPUNCTURE: CPT

## 2020-11-18 PROCEDURE — 80053 COMPREHEN METABOLIC PANEL: CPT

## 2020-11-18 PROCEDURE — 80061 LIPID PANEL: CPT

## 2020-11-18 PROCEDURE — 84443 ASSAY THYROID STIM HORMONE: CPT

## 2021-02-20 ENCOUNTER — HOSPITAL ENCOUNTER (EMERGENCY)
Facility: HOSPITAL | Age: 86
Discharge: HOME | End: 2021-02-20
Attending: EMERGENCY MEDICINE
Payer: MEDICARE

## 2021-02-20 VITALS
BODY MASS INDEX: 25 KG/M2 | RESPIRATION RATE: 18 BRPM | HEART RATE: 79 BPM | SYSTOLIC BLOOD PRESSURE: 147 MMHG | DIASTOLIC BLOOD PRESSURE: 76 MMHG | OXYGEN SATURATION: 95 % | WEIGHT: 138.88 LBS | TEMPERATURE: 99 F

## 2021-02-20 DIAGNOSIS — R04.0 EPISTAXIS: Primary | ICD-10-CM

## 2021-02-20 LAB
ANION GAP SERPL CALC-SCNC: 5 MMOL/L (ref 0–18)
BASOPHILS # BLD AUTO: 0.12 X10(3) UL (ref 0–0.2)
BASOPHILS NFR BLD AUTO: 1.3 %
BUN BLD-MCNC: 12 MG/DL (ref 7–18)
BUN/CREAT SERPL: 15.6 (ref 10–20)
CALCIUM BLD-MCNC: 8.5 MG/DL (ref 8.5–10.1)
CHLORIDE SERPL-SCNC: 106 MMOL/L (ref 98–112)
CO2 SERPL-SCNC: 30 MMOL/L (ref 21–32)
CREAT BLD-MCNC: 0.77 MG/DL
DEPRECATED RDW RBC AUTO: 44.1 FL (ref 35.1–46.3)
EOSINOPHIL # BLD AUTO: 0.21 X10(3) UL (ref 0–0.7)
EOSINOPHIL NFR BLD AUTO: 2.2 %
ERYTHROCYTE [DISTWIDTH] IN BLOOD BY AUTOMATED COUNT: 14.5 % (ref 11–15)
GLUCOSE BLD-MCNC: 114 MG/DL (ref 70–99)
HCT VFR BLD AUTO: 40.8 %
HGB BLD-MCNC: 12.9 G/DL
IMM GRANULOCYTES # BLD AUTO: 0.01 X10(3) UL (ref 0–1)
IMM GRANULOCYTES NFR BLD: 0.1 %
LYMPHOCYTES # BLD AUTO: 2.16 X10(3) UL (ref 1–4)
LYMPHOCYTES NFR BLD AUTO: 23 %
MCH RBC QN AUTO: 26.7 PG (ref 26–34)
MCHC RBC AUTO-ENTMCNC: 31.6 G/DL (ref 31–37)
MCV RBC AUTO: 84.3 FL
MONOCYTES # BLD AUTO: 0.58 X10(3) UL (ref 0.1–1)
MONOCYTES NFR BLD AUTO: 6.2 %
NEUTROPHILS # BLD AUTO: 6.31 X10 (3) UL (ref 1.5–7.7)
NEUTROPHILS # BLD AUTO: 6.31 X10(3) UL (ref 1.5–7.7)
NEUTROPHILS NFR BLD AUTO: 67.2 %
OSMOLALITY SERPL CALC.SUM OF ELEC: 293 MOSM/KG (ref 275–295)
PLATELET # BLD AUTO: 243 10(3)UL (ref 150–450)
POTASSIUM SERPL-SCNC: 3 MMOL/L (ref 3.5–5.1)
RBC # BLD AUTO: 4.84 X10(6)UL
SODIUM SERPL-SCNC: 141 MMOL/L (ref 136–145)
WBC # BLD AUTO: 9.4 X10(3) UL (ref 4–11)

## 2021-02-20 PROCEDURE — 36415 COLL VENOUS BLD VENIPUNCTURE: CPT

## 2021-02-20 PROCEDURE — 30903 CONTROL OF NOSEBLEED: CPT

## 2021-02-20 PROCEDURE — 99284 EMERGENCY DEPT VISIT MOD MDM: CPT

## 2021-02-20 PROCEDURE — 85025 COMPLETE CBC W/AUTO DIFF WBC: CPT | Performed by: EMERGENCY MEDICINE

## 2021-02-20 PROCEDURE — 80048 BASIC METABOLIC PNL TOTAL CA: CPT | Performed by: EMERGENCY MEDICINE

## 2021-02-20 RX ORDER — LIDOCAINE HYDROCHLORIDE 20 MG/ML
10 SOLUTION OROPHARYNGEAL ONCE
Status: COMPLETED | OUTPATIENT
Start: 2021-02-20 | End: 2021-02-20

## 2021-02-20 RX ORDER — CEPHALEXIN 500 MG/1
500 CAPSULE ORAL ONCE
Status: COMPLETED | OUTPATIENT
Start: 2021-02-20 | End: 2021-02-20

## 2021-02-20 RX ORDER — CEPHALEXIN 500 MG/1
500 CAPSULE ORAL 3 TIMES DAILY
Qty: 15 CAPSULE | Refills: 0 | Status: SHIPPED | OUTPATIENT
Start: 2021-02-20 | End: 2021-02-25

## 2021-02-20 NOTE — ED PROVIDER NOTES
Patient Seen in: [facility] Emergency Department      History   Patient presents with:  Nose Bleed    Stated Complaint: epistaxisis to right nare x1 hr.     HPI/Subjective:   HPI    77-year-old female with past medical history of A. fib, COPD,  michelle RETROGRADE CHOLANGIOPANCREATOGRAPHY (ERCP) N/A 8/28/2019    Performed by Regulo Shankar MD at [address] [place] [address] (ERCP) N/A 8/14/2019    Performed by eRgulo Shankar MD at [facility] ENDOSCOPY   • ERCP - INTERN Mouth/Throat:      Mouth: Mucous membranes are moist.   Eyes:      Extraocular Movements: Extraocular movements intact. Pupils: Pupils are equal, round, and reactive to light. Neck:      Musculoskeletal: Neck supple.    Cardiovascular:      Rate and an hour after a right Rhino Rocket was placed. I placed 8 cc into the [employer]. Given she is no longer bleeding, both patient and daughter are comfortable discharge at this time.   They agree to outpatient follow-up with ENT and will return for any co

## 2021-02-21 NOTE — ED NOTES
RN at bedside. Plan of care discussed with patient and family, all questions answered at this time. Warm blanket provided. Call light within reach.

## 2021-04-20 ENCOUNTER — HOSPITAL ENCOUNTER (EMERGENCY)
Facility: HOSPITAL | Age: 86
Discharge: LEFT BEFORE DISCHARGE | End: 2021-04-20
Payer: MEDICARE

## 2022-04-29 ENCOUNTER — LAB SERVICES (OUTPATIENT)
Dept: LAB | Age: 87
End: 2022-04-29

## 2022-04-29 DIAGNOSIS — J44.9 CHRONIC OBSTRUCTIVE PULMONARY DISEASE, UNSPECIFIED COPD TYPE (CMD): ICD-10-CM

## 2022-04-29 DIAGNOSIS — I42.0 CARDIOMYOPATHY, DILATED (CMD): ICD-10-CM

## 2022-04-29 DIAGNOSIS — I10 HYPERTENSION, UNSPECIFIED TYPE: Primary | ICD-10-CM

## 2022-04-29 DIAGNOSIS — E78.5 HYPERLIPIDEMIA, UNSPECIFIED HYPERLIPIDEMIA TYPE: ICD-10-CM

## 2022-04-29 LAB
ALBUMIN SERPL-MCNC: 3.6 G/DL (ref 3.6–5.1)
ALP SERPL-CCNC: 66 U/L (ref 45–130)
ALT SERPL W/O P-5'-P-CCNC: 12 U/L (ref 4–38)
AST SERPL-CCNC: 24 U/L (ref 14–43)
BILIRUB SERPL-MCNC: 0.9 MG/DL (ref 0–1.3)
BUN SERPL-MCNC: 10 MG/DL (ref 7–20)
CALCIUM SERPL-MCNC: 8.9 MG/DL (ref 8.6–10.6)
CHLORIDE SERPL-SCNC: 104 MMOL/L (ref 96–107)
CHOLEST SERPL-MCNC: 167 MG/DL (ref 140–200)
CO2 SERPL-SCNC: 30 MMOL/L (ref 22–32)
CREAT SERPL-MCNC: 0.7 MG/DL (ref 0.5–1.4)
GFR SERPL CREATININE-BSD FRML MDRD: >60 ML/MIN/{1.73M2}
GFR SERPL CREATININE-BSD FRML MDRD: >60 ML/MIN/{1.73M2}
GLUCOSE P FAST SERPL-MCNC: 107 MG/DL (ref 60–100)
HDLC SERPL-MCNC: 36 MG/DL
HEMATOCRIT: 42.5 % (ref 34–45)
HEMOGLOBIN: 13.4 G/DL (ref 11.2–15.7)
LDLC SERPL CALC-MCNC: 104 MG/DL (ref 30–100)
MAGNESIUM SERPL-MCNC: 1.4 MG/DL (ref 1.6–2.6)
MEAN CORPUSCULAR HGB CONCENTRATION: 31.5 % (ref 32–36)
MEAN CORPUSCULAR HGB: 27.5 PG (ref 27–34)
MEAN CORPUSCULAR VOLUME: 87.1 FL (ref 79–95)
MEAN PLATELET VOLUME: 10.8 FL (ref 8.6–12.4)
PLATELET COUNT: 248 10*3/UL (ref 150–400)
POTASSIUM SERPL-SCNC: 3.6 MMOL/L (ref 3.5–5.3)
PROT SERPL-MCNC: 7.2 G/DL (ref 6.4–8.5)
RED BLOOD CELL COUNT: 4.88 10*6/UL (ref 3.7–5.2)
RED CELL DISTRIBUTION WIDTH: 14 % (ref 11.3–14.8)
SODIUM SERPL-SCNC: 139 MMOL/L (ref 136–146)
TRIGL SERPL-MCNC: 137 MG/DL (ref 0–200)
TSH SERPL DL<=0.05 MIU/L-ACNC: 2.37 M[IU]/L (ref 0.3–4.82)
WHITE BLOOD CELL COUNT: 6.9 10*3/UL (ref 4–10)

## 2022-04-29 PROCEDURE — 85027 COMPLETE CBC AUTOMATED: CPT | Performed by: PATHOLOGY

## 2022-04-29 PROCEDURE — 80061 LIPID PANEL: CPT | Performed by: PATHOLOGY

## 2022-04-29 PROCEDURE — 36415 COLL VENOUS BLD VENIPUNCTURE: CPT | Performed by: PATHOLOGY

## 2022-04-29 PROCEDURE — 84443 ASSAY THYROID STIM HORMONE: CPT | Performed by: PATHOLOGY

## 2022-04-29 PROCEDURE — 80053 COMPREHEN METABOLIC PANEL: CPT | Performed by: PATHOLOGY

## 2022-04-29 PROCEDURE — 83735 ASSAY OF MAGNESIUM: CPT | Performed by: PATHOLOGY

## 2022-05-02 LAB — FAX RESULTS: NORMAL

## 2022-07-20 RX ORDER — METOPROLOL SUCCINATE 25 MG/1
25 TABLET, EXTENDED RELEASE ORAL DAILY
COMMUNITY
End: 2022-10-04 | Stop reason: SDUPTHER

## 2022-07-20 RX ORDER — LOSARTAN POTASSIUM 50 MG/1
50 TABLET ORAL 2 TIMES DAILY
COMMUNITY
End: 2022-10-04 | Stop reason: SDUPTHER

## 2022-09-30 PROBLEM — I42.9 CARDIOMYOPATHY (CMD): Status: ACTIVE | Noted: 2022-09-30

## 2022-09-30 PROBLEM — I73.9 PERIPHERAL VASCULAR DISEASE (CMD): Status: ACTIVE | Noted: 2022-09-30

## 2022-09-30 PROBLEM — I10 HYPERTENSION: Status: ACTIVE | Noted: 2022-09-30

## 2022-09-30 PROBLEM — J44.9 COPD (CHRONIC OBSTRUCTIVE PULMONARY DISEASE) (CMD): Status: ACTIVE | Noted: 2022-09-30

## 2022-09-30 PROBLEM — R07.89 ATYPICAL CHEST PAIN: Status: ACTIVE | Noted: 2022-09-30

## 2022-09-30 PROBLEM — E78.5 DYSLIPIDEMIA: Status: ACTIVE | Noted: 2022-09-30

## 2022-10-04 ENCOUNTER — OFFICE VISIT (OUTPATIENT)
Dept: CARDIOLOGY | Age: 87
End: 2022-10-04

## 2022-10-04 VITALS
BODY MASS INDEX: 23.44 KG/M2 | HEART RATE: 71 BPM | SYSTOLIC BLOOD PRESSURE: 131 MMHG | HEIGHT: 63 IN | DIASTOLIC BLOOD PRESSURE: 70 MMHG | WEIGHT: 132.28 LBS

## 2022-10-04 DIAGNOSIS — I42.9 CARDIOMYOPATHY, UNSPECIFIED TYPE (CMD): ICD-10-CM

## 2022-10-04 DIAGNOSIS — R94.31 ABNORMAL EKG: ICD-10-CM

## 2022-10-04 DIAGNOSIS — E78.5 DYSLIPIDEMIA: ICD-10-CM

## 2022-10-04 DIAGNOSIS — I73.9 PERIPHERAL VASCULAR DISEASE (CMD): ICD-10-CM

## 2022-10-04 DIAGNOSIS — I10 PRIMARY HYPERTENSION: Primary | ICD-10-CM

## 2022-10-04 DIAGNOSIS — I34.0 NONRHEUMATIC MITRAL VALVE REGURGITATION: ICD-10-CM

## 2022-10-04 PROCEDURE — 99214 OFFICE O/P EST MOD 30 MIN: CPT | Performed by: SPECIALIST

## 2022-10-04 RX ORDER — LOSARTAN POTASSIUM 50 MG/1
50 TABLET ORAL 2 TIMES DAILY
Qty: 180 TABLET | Refills: 2 | Status: SHIPPED | OUTPATIENT
Start: 2022-10-04 | End: 2023-07-20 | Stop reason: SDUPTHER

## 2022-10-04 RX ORDER — METOPROLOL SUCCINATE 25 MG/1
25 TABLET, EXTENDED RELEASE ORAL DAILY
Qty: 90 TABLET | Refills: 2 | Status: SHIPPED | OUTPATIENT
Start: 2022-10-04 | End: 2023-04-25 | Stop reason: SDUPTHER

## 2022-10-04 RX ORDER — MIRTAZAPINE 7.5 MG/1
TABLET, FILM COATED ORAL NIGHTLY
COMMUNITY
Start: 2022-08-09 | End: 2023-04-05

## 2022-10-04 SDOH — HEALTH STABILITY: PHYSICAL HEALTH: ON AVERAGE, HOW MANY DAYS PER WEEK DO YOU ENGAGE IN MODERATE TO STRENUOUS EXERCISE (LIKE A BRISK WALK)?: 0 DAYS

## 2022-10-04 SDOH — HEALTH STABILITY: PHYSICAL HEALTH: ON AVERAGE, HOW MANY MINUTES DO YOU ENGAGE IN EXERCISE AT THIS LEVEL?: 0 MIN

## 2022-10-04 ASSESSMENT — PATIENT HEALTH QUESTIONNAIRE - PHQ9
SUM OF ALL RESPONSES TO PHQ9 QUESTIONS 1 AND 2: 2
1. LITTLE INTEREST OR PLEASURE IN DOING THINGS: SEVERAL DAYS
CLINICAL INTERPRETATION OF PHQ2 SCORE: NO FURTHER SCREENING NEEDED
SUM OF ALL RESPONSES TO PHQ9 QUESTIONS 1 AND 2: 2
2. FEELING DOWN, DEPRESSED OR HOPELESS: SEVERAL DAYS

## 2022-10-11 ENCOUNTER — APPOINTMENT (OUTPATIENT)
Dept: CARDIOLOGY | Age: 87
End: 2022-10-11

## 2022-10-18 NOTE — PROGRESS NOTES
Darin Rucker, 4/27/1930, 80year old, female    Chief Complaint:  Patient presents with:   Follow - Up  Weakness  Musculoskeletal Problem     [employer] Admit date:  7/26/20  Discharge date to [facility]:  7/30/20  ELOS:  12-14 days  Anticipated discha October 18, 2022         No Recipients      Patient: Zamzam Moreno   YOB: 1938   Date of Visit: 10/18/2022       Dear Dr. Simpson Recipients:    I saw your patient, Zamzam Moreno, for an evaluation. Below are my notes for this visit with her.    If you have questions, please do not hesitate to call me.      Sincerely,        Arun Caceres MD        CC:   No Recipients  Arun Caceres MD  10/18/2022  5:58 PM  Sign when Signing Visit  Subjective   Patient ID: Zamzam is a 84 year old female.    Chief Complaint   Patient presents with   • Office Visit   • Follow-up     Diarrhea and hemorrhoids   • Other     Note to go back work     Patient is here for follow-up of diarrhea and abdominal pain.  She was prescribed Flagyl and Cipro for possible subacute diverticulitis.  Patient states that diarrhea has subsided a somewhat and there is no hemorrhoidal bleeding.  However she has mid abdominal discomfort especially on the left side  No fever, nausea or vomiting however patient is not eating solid food for the fear of abdominal pain.    Patient Active Problem List   Diagnosis   • Hypothyroidism   • Hyperlipidemia   • Essential (primary) hypertension   • GERD (gastroesophageal reflux disease)   • Major depressive disorder with single episode, in full remission (CMS/HCC)   • TIA (transient ischemic attack)   • Asthma   • Pure hypercholesterolemia   • COVID-19 virus infection   • Lumbar disc disease with radiculopathy   • Cerebellar ataxia (CMS/HCC)   • Acute diverticulitis   • Need for immunization against influenza       Past Medical History:   Diagnosis Date   • Asthma    • Essential (primary) hypertension    • GERD (gastroesophageal reflux disease)    • Hyperlipidemia    • Hypothyroidism    • Major depressive disorder, single episode, unspecified    • TIA (transient ischemic attack)      Past Surgical History:   Procedure Laterality Date   • Abdomen surgery  2016     Family History   Problem Relation  Age of Onset   • Liver Disease Mother    • Congestive Heart Failure Father    • Heart disease Son        Current Outpatient Medications   Medication Sig   • metroNIDAZOLE (FLAGYL) 250 MG tablet Take 1 tablet by mouth every 8 hours for 7 days.   • ciprofloxacin (CIPRO) 500 MG tablet Take 1 tablet by mouth in the morning and 1 tablet in the evening. Do all this for 7 days.   • dibucaine (NUPERCAINAL) 1 % ointment Apply 1 application topically in the morning and 1 application at noon and 1 application in the evening.   • famotidine (PEPCID) 40 MG tablet TAKE 1 TABLET BY MOUTH DAILY   • sertraline (ZOLOFT) 100 MG tablet TAKE 1 TABLET BY MOUTH DAILY   • albuterol 108 (90 Base) MCG/ACT inhaler Inhale 2 puffs into the lungs every 4 hours as needed for Shortness of Breath.   • levothyroxine 112 MCG tablet TAKE 1 TABLET BY MOUTH DAILY   • meclizine (ANTIVERT) 12.5 MG tablet Take 1 tablet by mouth 3 times daily as needed for Dizziness.   • aspirin 81 MG EC tablet Take 1 tablet by mouth daily.   • atorvastatin (LIPITOR) 20 MG tablet Take 1 tablet by mouth daily.   • HYDROcodone-acetaminophen (NORCO) 5-325 MG per tablet 1 tablet 2 times daily as needed.    • carvedilol (COREG) 12.5 MG tablet Take 1 tablet by mouth 2 times daily (with meals).   • hydrochlorothiazide (HYDRODIURIL) 12.5 MG tablet Take 1 tablet by mouth every morning.   • losartan (COZAAR) 50 MG tablet Take 1 tablet by mouth daily.   • calcium carbonate-vitamin D (CALTRATE+D) 600-400 MG-UNIT per tablet Take 1 tablet by mouth daily.   • Cholecalciferol (vitamin D3) 25 mcg (1,000 units) capsule Take 25 mcg by mouth daily.   • Ascorbic Acid (vitamin C) 1000 MG tablet Take 1,000 mg by mouth daily.   • vitamin B-12 (CYANOCOBALAMIN) 1000 MCG tablet Take 1,000 mcg by mouth daily.     No current facility-administered medications for this visit.     ALLERGIES:  No Known Allergies    Review of Systems   Constitutional: Negative for activity change and unexpected weight  CXR--wnl    LABS IN [facility] 7/31/20:    CBC W/DIFF AND PLATELETS  WHITE BLOOD CELLS 9.34 THO/mm3 4.80 - 10.80 Final  RED BLOOD CELLS 4.66 MIL/mm3 4. 20 - 5.40 Final  HEMOGLOBIN 12.4 gm/dL 12.0 - 16.0 Final  HEMATOCRIT 39.3 % 37.0 - 47.0 Final  MCV 84.3 um3 81. 0 change.   HENT: Positive for hearing loss. Negative for congestion.         Hearing aids   Eyes: Negative for photophobia, pain, redness and visual disturbance.   Respiratory: Negative for apnea, cough, chest tightness, shortness of breath and wheezing.    Cardiovascular: Negative for chest pain, palpitations and leg swelling.   Gastrointestinal: Positive for diarrhea and rectal pain. Negative for abdominal distention, abdominal pain, blood in stool, constipation, nausea and vomiting.        Refer to HPI   Genitourinary: Negative for difficulty urinating.   Musculoskeletal: Positive for back pain. Negative for arthralgias, gait problem, joint swelling, myalgias, neck pain and neck stiffness.        Received epidural block in February 2022 with good pain relief.   Skin: Negative for color change, pallor and rash.   Neurological: Negative for dizziness, tremors, seizures, syncope, speech difficulty, weakness, light-headedness, numbness and headaches.        Refer to HPI       Objective   Visit Vitals  /68   Pulse 73   Temp 97.7 °F (36.5 °C) (Temporal)   Resp 16   Ht 5' 1\" (1.549 m)   Wt 58.1 kg (128 lb)   SpO2 98%   BMI 24.19 kg/m²     Physical Exam  Constitutional:       General: She is awake. She is not in acute distress.     Appearance: Normal appearance. She is well-developed.   HENT:      Head: Normocephalic and atraumatic.      Right Ear: Hearing and tympanic membrane normal.      Left Ear: Hearing and tympanic membrane normal.      Ears:      Comments: Bilateral hearing aids     Mouth/Throat:      Lips: Pink.      Mouth: Mucous membranes are moist.      Tongue: No lesions.      Pharynx: Oropharynx is clear. Uvula midline.      Tonsils: No tonsillar exudate.      Neck: Normal range of motion and neck supple. No tenderness.   Eyes:      Extraocular Movements: Extraocular movements intact.      Conjunctiva/sclera: Conjunctivae normal.      Pupils: Pupils are equal, round, and reactive to light.       daily   Amlodipine 5mg po daily   Labs weekly in [place]; more often if clinically indicated  Follow-up with PCP/Cards post-[place]     COPD; supp O2 overnight  VS q shift  8/2: CXR--wnl  Follow-up with PCP/pulm post-[place]; consider sleep study; r/o OLI     Bowel Ma Comments: No nystagmus   Neck:      Thyroid: No thyroid mass, thyromegaly or thyroid tenderness.   Cardiovascular:      Rate and Rhythm: Normal rate and regular rhythm.      Heart sounds: S1 normal and S2 normal. No murmur heard.    No friction rub. No gallop.   Pulmonary:      Effort: Pulmonary effort is normal. No respiratory distress.      Breath sounds: Normal breath sounds. No decreased breath sounds, wheezing, rhonchi or rales.   Abdominal:      General: Bowel sounds are normal.      Palpations: Abdomen is soft. There is no mass.      Tenderness: There is abdominal tenderness (Tenderness along left lateral abdomen). There is guarding and rebound. There is no right CVA tenderness or left CVA tenderness.      Hernia: No hernia is present.   Musculoskeletal:      Right lower leg: No edema.      Left lower leg: No edema.      Comments: Range of movement of lumbar spine is limited due to pain.   Skin:     General: Skin is warm and dry.      Findings: No rash.   Neurological:      General: No focal deficit present.      Mental Status: She is alert and oriented to person, place, and time.      Cranial Nerves: No cranial nerve deficit.      Sensory: Sensation is intact.      Motor: Motor function is intact. No weakness.      Coordination: Coordination normal.      Gait: Gait is intact.      Comments: Gait is normal.   Psychiatric:         Attention and Perception: Attention and perception normal.         Speech: Speech normal.         Behavior: Behavior is cooperative.         Cognition and Memory: Cognition normal.            Assessment   Problem List Items Addressed This Visit        Cardiac and Vasculature    Essential (primary) hypertension       Gastrointestinal and Abdominal    Acute diverticulitis - Primary       Neuro    Cerebellar ataxia (CMS/HCC)          PLAN  Patient is here for follow-up of diarrhea, rectal bleeding and abdominal pain.  She was treated with Cipro for possible diverticulitis.  Today's  examination shows continuous tenderness and guarding in the left lower abdomen.  She also has mild rebound.  Suspect subacute diverticulitis possible abscess formation.  Patient needs CT scan of the abdomen and I do not believe he would be able to do that as an outpatient.  Patient prefers to go to [facility].    I advised her to go to emergency room there.    This note was created using the Dragon voice recognition system. Errors in content may be related to improper recognition of the system. Effort to review and correct the note has been made but irregularities may still be present.

## 2023-04-05 ENCOUNTER — OFFICE VISIT (OUTPATIENT)
Dept: CARDIOLOGY | Age: 88
End: 2023-04-05

## 2023-04-05 VITALS
DIASTOLIC BLOOD PRESSURE: 74 MMHG | BODY MASS INDEX: 23.05 KG/M2 | HEIGHT: 63 IN | HEART RATE: 64 BPM | SYSTOLIC BLOOD PRESSURE: 121 MMHG | WEIGHT: 130.07 LBS

## 2023-04-05 DIAGNOSIS — I34.0 NONRHEUMATIC MITRAL VALVE REGURGITATION: ICD-10-CM

## 2023-04-05 DIAGNOSIS — R94.31 ABNORMAL EKG: ICD-10-CM

## 2023-04-05 DIAGNOSIS — I73.9 PERIPHERAL VASCULAR DISEASE (CMD): ICD-10-CM

## 2023-04-05 DIAGNOSIS — I42.0 DILATED CARDIOMYOPATHY (CMD): Primary | ICD-10-CM

## 2023-04-05 DIAGNOSIS — J44.9 CHRONIC OBSTRUCTIVE PULMONARY DISEASE, UNSPECIFIED COPD TYPE (CMD): ICD-10-CM

## 2023-04-05 DIAGNOSIS — E78.5 DYSLIPIDEMIA: ICD-10-CM

## 2023-04-05 DIAGNOSIS — I10 PRIMARY HYPERTENSION: ICD-10-CM

## 2023-04-05 PROCEDURE — 99214 OFFICE O/P EST MOD 30 MIN: CPT | Performed by: SPECIALIST

## 2023-04-05 SDOH — HEALTH STABILITY: PHYSICAL HEALTH: ON AVERAGE, HOW MANY DAYS PER WEEK DO YOU ENGAGE IN MODERATE TO STRENUOUS EXERCISE (LIKE A BRISK WALK)?: 3 DAYS

## 2023-04-05 SDOH — HEALTH STABILITY: PHYSICAL HEALTH: ON AVERAGE, HOW MANY MINUTES DO YOU ENGAGE IN EXERCISE AT THIS LEVEL?: 30 MIN

## 2023-04-05 ASSESSMENT — PATIENT HEALTH QUESTIONNAIRE - PHQ9
SUM OF ALL RESPONSES TO PHQ9 QUESTIONS 1 AND 2: 0
1. LITTLE INTEREST OR PLEASURE IN DOING THINGS: NOT AT ALL
2. FEELING DOWN, DEPRESSED OR HOPELESS: NOT AT ALL
CLINICAL INTERPRETATION OF PHQ2 SCORE: NO FURTHER SCREENING NEEDED
SUM OF ALL RESPONSES TO PHQ9 QUESTIONS 1 AND 2: 0

## 2023-04-09 ENCOUNTER — HOSPITAL ENCOUNTER (EMERGENCY)
Facility: HOSPITAL | Age: 88
Discharge: HOME | End: 2023-04-09
Attending: EMERGENCY MEDICINE
Payer: MEDICARE

## 2023-04-09 VITALS
DIASTOLIC BLOOD PRESSURE: 72 MMHG | OXYGEN SATURATION: 95 % | SYSTOLIC BLOOD PRESSURE: 155 MMHG | HEART RATE: 87 BPM | RESPIRATION RATE: 14 BRPM | TEMPERATURE: 98 F

## 2023-04-09 DIAGNOSIS — N39.0 URINARY TRACT INFECTION WITHOUT HEMATURIA, SITE UNSPECIFIED: Primary | ICD-10-CM

## 2023-04-09 LAB
BILIRUB UR QL STRIP.AUTO: NEGATIVE
COLOR UR AUTO: YELLOW
GLUCOSE UR STRIP.AUTO-MCNC: NEGATIVE MG/DL
KETONES UR STRIP.AUTO-MCNC: NEGATIVE MG/DL
NITRITE UR QL STRIP.AUTO: POSITIVE
PH UR STRIP.AUTO: 6 [PH] (ref 5–8)
PROT UR STRIP.AUTO-MCNC: 30 MG/DL
RBC #/AREA URNS AUTO: >10 /HPF
SP GR UR STRIP.AUTO: 1.01 (ref 1–1.03)
UROBILINOGEN UR STRIP.AUTO-MCNC: <2 MG/DL
WBC #/AREA URNS AUTO: >50 /HPF
WBC CLUMPS UR QL AUTO: PRESENT /HPF

## 2023-04-09 PROCEDURE — 99284 EMERGENCY DEPT VISIT MOD MDM: CPT

## 2023-04-09 PROCEDURE — 87186 SC STD MICRODIL/AGAR DIL: CPT | Performed by: EMERGENCY MEDICINE

## 2023-04-09 PROCEDURE — 99283 EMERGENCY DEPT VISIT LOW MDM: CPT

## 2023-04-09 PROCEDURE — 81001 URINALYSIS AUTO W/SCOPE: CPT | Performed by: EMERGENCY MEDICINE

## 2023-04-09 PROCEDURE — 87086 URINE CULTURE/COLONY COUNT: CPT | Performed by: EMERGENCY MEDICINE

## 2023-04-09 PROCEDURE — 87088 URINE BACTERIA CULTURE: CPT | Performed by: EMERGENCY MEDICINE

## 2023-04-09 RX ORDER — CEPHALEXIN 500 MG/1
500 CAPSULE ORAL 3 TIMES DAILY
Qty: 30 CAPSULE | Refills: 0 | Status: SHIPPED | OUTPATIENT
Start: 2023-04-09 | End: 2023-04-19

## 2023-04-09 RX ORDER — LOSARTAN POTASSIUM 50 MG/1
TABLET ORAL DAILY
COMMUNITY

## 2023-04-09 RX ORDER — MULTIVITAMIN WITH IRON
250 TABLET ORAL
COMMUNITY

## 2023-04-09 NOTE — ED INITIAL ASSESSMENT (HPI)
Patient reports c/o burning with urination since yesterday. Alert and oriented to person, place, time/situation. normal mood and affect. no apparent risk to self or others.

## 2023-04-11 NOTE — PROGRESS NOTES
ED Pharmacist Discharge Culture Review    Final urine culture positive for Escherichia coli, sensitive to the prescribed regimen of cephalexin 500 mg PO three times daily x10 days. No further culture follow-up required.      Thank you,  Crissy Young, PharmD, BCPS  04/11/23; 11:31 AM

## 2023-04-24 DIAGNOSIS — I73.9 PERIPHERAL VASCULAR DISEASE (CMD): ICD-10-CM

## 2023-04-24 DIAGNOSIS — J44.9 CHRONIC OBSTRUCTIVE PULMONARY DISEASE, UNSPECIFIED COPD TYPE (CMD): ICD-10-CM

## 2023-04-24 DIAGNOSIS — E78.5 DYSLIPIDEMIA: ICD-10-CM

## 2023-04-24 DIAGNOSIS — R73.9 ELEVATED BLOOD SUGAR LEVEL: ICD-10-CM

## 2023-04-24 DIAGNOSIS — I42.0 DILATED CARDIOMYOPATHY (CMD): Primary | ICD-10-CM

## 2023-04-25 ENCOUNTER — LAB SERVICES (OUTPATIENT)
Dept: LAB | Age: 88
End: 2023-04-25

## 2023-04-25 DIAGNOSIS — R94.31 ABNORMAL EKG: ICD-10-CM

## 2023-04-25 DIAGNOSIS — E78.5 DYSLIPIDEMIA: ICD-10-CM

## 2023-04-25 DIAGNOSIS — I34.0 NONRHEUMATIC MITRAL VALVE REGURGITATION: ICD-10-CM

## 2023-04-25 DIAGNOSIS — I73.9 PERIPHERAL VASCULAR DISEASE (CMD): ICD-10-CM

## 2023-04-25 DIAGNOSIS — I10 PRIMARY HYPERTENSION: ICD-10-CM

## 2023-04-25 DIAGNOSIS — R73.9 ELEVATED BLOOD SUGAR LEVEL: ICD-10-CM

## 2023-04-25 DIAGNOSIS — I42.0 DILATED CARDIOMYOPATHY (CMD): ICD-10-CM

## 2023-04-25 DIAGNOSIS — J44.9 CHRONIC OBSTRUCTIVE PULMONARY DISEASE, UNSPECIFIED COPD TYPE (CMD): ICD-10-CM

## 2023-04-25 LAB
ALBUMIN SERPL-MCNC: 3.4 G/DL (ref 3.6–5.1)
ALBUMIN/GLOB SERPL: 0.8 {RATIO} (ref 1–2.4)
ALP SERPL-CCNC: 85 UNITS/L (ref 45–117)
ALT SERPL-CCNC: 14 UNITS/L
ANION GAP SERPL CALC-SCNC: 6 MMOL/L (ref 7–19)
AST SERPL-CCNC: 13 UNITS/L
BASOPHILS # BLD: 0.1 K/MCL (ref 0–0.3)
BASOPHILS NFR BLD: 2 %
BILIRUB SERPL-MCNC: 0.5 MG/DL (ref 0.2–1)
BUN SERPL-MCNC: 12 MG/DL (ref 6–20)
BUN/CREAT SERPL: 16 (ref 7–25)
CALCIUM SERPL-MCNC: 9.7 MG/DL (ref 8.4–10.2)
CHLORIDE SERPL-SCNC: 106 MMOL/L (ref 97–110)
CHOLEST SERPL-MCNC: 168 MG/DL
CHOLEST/HDLC SERPL: 3.9 {RATIO}
CO2 SERPL-SCNC: 31 MMOL/L (ref 21–32)
CREAT SERPL-MCNC: 0.73 MG/DL (ref 0.51–0.95)
DEPRECATED RDW RBC: 43.3 FL (ref 39–50)
EOSINOPHIL # BLD: 0.4 K/MCL (ref 0–0.5)
EOSINOPHIL NFR BLD: 4 %
ERYTHROCYTE [DISTWIDTH] IN BLOOD: 13.4 % (ref 11–15)
FASTING DURATION TIME PATIENT: 15 HOURS (ref 0–999)
GFR SERPLBLD BASED ON 1.73 SQ M-ARVRAT: 77 ML/MIN
GLOBULIN SER-MCNC: 4.2 G/DL (ref 2–4)
GLUCOSE SERPL-MCNC: 98 MG/DL (ref 70–99)
HBA1C MFR BLD: 5.7 % (ref 4.5–5.6)
HCT VFR BLD CALC: 44.8 % (ref 36–46.5)
HDLC SERPL-MCNC: 43 MG/DL
HGB BLD-MCNC: 14.2 G/DL (ref 12–15.5)
IMM GRANULOCYTES # BLD AUTO: 0 K/MCL (ref 0–0.2)
IMM GRANULOCYTES # BLD: 0 %
LDLC SERPL CALC-MCNC: 99 MG/DL
LYMPHOCYTES # BLD: 1.8 K/MCL (ref 1–4)
LYMPHOCYTES NFR BLD: 20 %
MCH RBC QN AUTO: 27.8 PG (ref 26–34)
MCHC RBC AUTO-ENTMCNC: 31.7 G/DL (ref 32–36.5)
MCV RBC AUTO: 87.7 FL (ref 78–100)
MONOCYTES # BLD: 0.5 K/MCL (ref 0.3–0.9)
MONOCYTES NFR BLD: 6 %
NEUTROPHILS # BLD: 6.1 K/MCL (ref 1.8–7.7)
NEUTROPHILS NFR BLD: 68 %
NONHDLC SERPL-MCNC: 125 MG/DL
NRBC BLD MANUAL-RTO: 0 /100 WBC
PLATELET # BLD AUTO: 280 K/MCL (ref 140–450)
POTASSIUM SERPL-SCNC: 4.1 MMOL/L (ref 3.4–5.1)
PROT SERPL-MCNC: 7.6 G/DL (ref 6.4–8.2)
RBC # BLD: 5.11 MIL/MCL (ref 4–5.2)
SODIUM SERPL-SCNC: 139 MMOL/L (ref 135–145)
TRIGL SERPL-MCNC: 131 MG/DL
TSH SERPL-ACNC: 2.05 MCUNITS/ML (ref 0.35–5)
WBC # BLD: 9 K/MCL (ref 4.2–11)

## 2023-04-25 PROCEDURE — 84443 ASSAY THYROID STIM HORMONE: CPT | Performed by: CLINICAL MEDICAL LABORATORY

## 2023-04-25 PROCEDURE — 36415 COLL VENOUS BLD VENIPUNCTURE: CPT | Performed by: CLINICAL MEDICAL LABORATORY

## 2023-04-25 PROCEDURE — 80061 LIPID PANEL: CPT | Performed by: CLINICAL MEDICAL LABORATORY

## 2023-04-25 PROCEDURE — 85025 COMPLETE CBC W/AUTO DIFF WBC: CPT | Performed by: CLINICAL MEDICAL LABORATORY

## 2023-04-25 PROCEDURE — 80053 COMPREHEN METABOLIC PANEL: CPT | Performed by: CLINICAL MEDICAL LABORATORY

## 2023-04-25 PROCEDURE — 83036 HEMOGLOBIN GLYCOSYLATED A1C: CPT | Performed by: CLINICAL MEDICAL LABORATORY

## 2023-04-25 RX ORDER — METOPROLOL SUCCINATE 25 MG/1
25 TABLET, EXTENDED RELEASE ORAL DAILY
Qty: 90 TABLET | Refills: 2 | Status: SHIPPED | OUTPATIENT
Start: 2023-04-25 | End: 2023-07-20 | Stop reason: SDUPTHER

## 2023-04-27 ENCOUNTER — TELEPHONE (OUTPATIENT)
Dept: CARDIOLOGY | Age: 88
End: 2023-04-27

## 2023-05-24 ENCOUNTER — ANCILLARY PROCEDURE (OUTPATIENT)
Dept: CARDIOLOGY | Age: 88
End: 2023-05-24
Attending: SPECIALIST

## 2023-05-24 DIAGNOSIS — I10 PRIMARY HYPERTENSION: ICD-10-CM

## 2023-05-24 DIAGNOSIS — J44.9 CHRONIC OBSTRUCTIVE PULMONARY DISEASE, UNSPECIFIED COPD TYPE (CMD): ICD-10-CM

## 2023-05-24 DIAGNOSIS — R94.31 ABNORMAL EKG: ICD-10-CM

## 2023-05-24 DIAGNOSIS — E78.5 DYSLIPIDEMIA: ICD-10-CM

## 2023-05-24 DIAGNOSIS — I42.0 DILATED CARDIOMYOPATHY (CMD): ICD-10-CM

## 2023-05-24 DIAGNOSIS — I73.9 PERIPHERAL VASCULAR DISEASE (CMD): ICD-10-CM

## 2023-05-24 DIAGNOSIS — I34.0 NONRHEUMATIC MITRAL VALVE REGURGITATION: ICD-10-CM

## 2023-05-24 LAB
AORTIC VALVE AREA (AVA): 0.77
ASCENDING AORTA (AAD): 3
AV PEAK GRADIENT (AVPG): 7
AV PEAK VELOCITY (AVPV): 1.29
AV STENOSIS SEVERITY TEXT: NORMAL
AVI LVOT PEAK GRADIENT (LVOTMG): 1.1
E WAVE DECELARATION TIME (MDT): 25.5
INTERVENTRICULAR SEPTUM IN END DIASTOLE (IVSD): 1.42
LEFT INTERNAL DIMENSION IN SYSTOLE (LVSD): 1.4
LEFT VENTRICULAR INTERNAL DIMENSION IN DIASTOLE (LVDD): 3.3
LEFT VENTRICULAR POSTERIOR WALL IN END DIASTOLE (LVPW): 5
LV EF: NORMAL %
LVOT VTI (LVOTVTI): 0.92
MV E TISSUE VEL MED (MESV): 3.2
MV E WAVE VEL/E TISSUE VEL MED(MSR): 3
MV PEAK A VELOCITY (MVPAV): 288
MV PEAK E VELOCITY (MVPEV): 1.35
RV END SYSTOLIC LONGITUDINAL STRAIN FREE WALL (RVGS): 2
TRICUSPID VALVE ANNULAR PEAK VELOCITY (TVAPV): 30
TRICUSPID VALVE PEAK REGURGITATION VELOCITY (TRPV): 4
TV ESTIMATED RIGHT ARTERIAL PRESSURE (RAP): 9.46

## 2023-05-24 PROCEDURE — 93306 TTE W/DOPPLER COMPLETE: CPT | Performed by: SPECIALIST

## 2023-07-20 RX ORDER — METOPROLOL SUCCINATE 25 MG/1
25 TABLET, EXTENDED RELEASE ORAL DAILY
Qty: 90 TABLET | Refills: 2 | Status: SHIPPED | OUTPATIENT
Start: 2023-07-20

## 2023-07-20 RX ORDER — LOSARTAN POTASSIUM 50 MG/1
50 TABLET ORAL 2 TIMES DAILY
Qty: 180 TABLET | Refills: 2 | Status: SHIPPED | OUTPATIENT
Start: 2023-07-20

## 2023-10-25 ENCOUNTER — OFFICE VISIT (OUTPATIENT)
Dept: CARDIOLOGY | Age: 88
End: 2023-10-25

## 2023-10-25 VITALS
HEART RATE: 60 BPM | HEIGHT: 63 IN | SYSTOLIC BLOOD PRESSURE: 120 MMHG | DIASTOLIC BLOOD PRESSURE: 64 MMHG | BODY MASS INDEX: 25 KG/M2 | WEIGHT: 141.09 LBS

## 2023-10-25 DIAGNOSIS — R94.31 ABNORMAL EKG: ICD-10-CM

## 2023-10-25 DIAGNOSIS — I10 PRIMARY HYPERTENSION: ICD-10-CM

## 2023-10-25 DIAGNOSIS — I42.0 DILATED CARDIOMYOPATHY (CMD): Primary | ICD-10-CM

## 2023-10-25 DIAGNOSIS — J44.9 CHRONIC OBSTRUCTIVE PULMONARY DISEASE, UNSPECIFIED COPD TYPE (CMD): ICD-10-CM

## 2023-10-25 DIAGNOSIS — I34.0 NONRHEUMATIC MITRAL VALVE REGURGITATION: ICD-10-CM

## 2023-10-25 DIAGNOSIS — E78.5 DYSLIPIDEMIA: ICD-10-CM

## 2023-10-25 DIAGNOSIS — I73.9 PERIPHERAL VASCULAR DISEASE (CMD): ICD-10-CM

## 2023-10-25 PROCEDURE — 99213 OFFICE O/P EST LOW 20 MIN: CPT | Performed by: SPECIALIST

## 2023-10-25 PROCEDURE — 93000 ELECTROCARDIOGRAM COMPLETE: CPT | Performed by: SPECIALIST

## 2023-10-25 SDOH — HEALTH STABILITY: PHYSICAL HEALTH: ON AVERAGE, HOW MANY DAYS PER WEEK DO YOU ENGAGE IN MODERATE TO STRENUOUS EXERCISE (LIKE A BRISK WALK)?: 3 DAYS

## 2023-10-25 SDOH — HEALTH STABILITY: PHYSICAL HEALTH: ON AVERAGE, HOW MANY MINUTES DO YOU ENGAGE IN EXERCISE AT THIS LEVEL?: 30 MIN

## 2023-10-25 ASSESSMENT — PATIENT HEALTH QUESTIONNAIRE - PHQ9
CLINICAL INTERPRETATION OF PHQ2 SCORE: NO FURTHER SCREENING NEEDED
1. LITTLE INTEREST OR PLEASURE IN DOING THINGS: NOT AT ALL
SUM OF ALL RESPONSES TO PHQ9 QUESTIONS 1 AND 2: 0
SUM OF ALL RESPONSES TO PHQ9 QUESTIONS 1 AND 2: 0
2. FEELING DOWN, DEPRESSED OR HOPELESS: NOT AT ALL

## 2024-04-15 RX ORDER — METOPROLOL SUCCINATE 25 MG/1
25 TABLET, EXTENDED RELEASE ORAL DAILY
Qty: 90 TABLET | Refills: 3 | Status: SHIPPED | OUTPATIENT
Start: 2024-04-15

## 2024-04-22 RX ORDER — LOSARTAN POTASSIUM 50 MG/1
50 TABLET ORAL 2 TIMES DAILY
Qty: 180 TABLET | Refills: 0 | Status: SHIPPED | OUTPATIENT
Start: 2024-04-22

## 2024-04-25 ENCOUNTER — APPOINTMENT (OUTPATIENT)
Dept: CARDIOLOGY | Age: 89
End: 2024-04-25

## 2024-04-25 ENCOUNTER — LAB SERVICES (OUTPATIENT)
Dept: LAB | Age: 89
End: 2024-04-25

## 2024-04-25 VITALS
DIASTOLIC BLOOD PRESSURE: 67 MMHG | HEIGHT: 63 IN | WEIGHT: 143.3 LBS | BODY MASS INDEX: 25.39 KG/M2 | HEART RATE: 62 BPM | SYSTOLIC BLOOD PRESSURE: 131 MMHG

## 2024-04-25 DIAGNOSIS — I42.0 DILATED CARDIOMYOPATHY (CMD): Primary | ICD-10-CM

## 2024-04-25 DIAGNOSIS — I10 PRIMARY HYPERTENSION: ICD-10-CM

## 2024-04-25 DIAGNOSIS — I34.0 NONRHEUMATIC MITRAL VALVE REGURGITATION: ICD-10-CM

## 2024-04-25 DIAGNOSIS — I73.9 PERIPHERAL VASCULAR DISEASE (CMD): ICD-10-CM

## 2024-04-25 DIAGNOSIS — J44.9 CHRONIC OBSTRUCTIVE PULMONARY DISEASE, UNSPECIFIED COPD TYPE (CMD): ICD-10-CM

## 2024-04-25 DIAGNOSIS — I42.0 DILATED CARDIOMYOPATHY  (CMD): ICD-10-CM

## 2024-04-25 DIAGNOSIS — E78.5 DYSLIPIDEMIA: ICD-10-CM

## 2024-04-25 DIAGNOSIS — J44.9 CHRONIC OBSTRUCTIVE PULMONARY DISEASE, UNSPECIFIED COPD TYPE  (CMD): ICD-10-CM

## 2024-04-25 LAB
ALBUMIN SERPL-MCNC: 3.3 G/DL (ref 3.6–5.1)
ALBUMIN/GLOB SERPL: 0.8 {RATIO} (ref 1–2.4)
ALP SERPL-CCNC: 85 UNITS/L (ref 45–117)
ALT SERPL-CCNC: 16 UNITS/L
ANION GAP SERPL CALC-SCNC: 6 MMOL/L (ref 7–19)
AST SERPL-CCNC: 16 UNITS/L
BASOPHILS # BLD: 0.1 K/MCL (ref 0–0.3)
BASOPHILS NFR BLD: 1 %
BILIRUB SERPL-MCNC: 0.9 MG/DL (ref 0.2–1)
BUN SERPL-MCNC: 11 MG/DL (ref 6–20)
BUN/CREAT SERPL: 15 (ref 7–25)
CALCIUM SERPL-MCNC: 8.8 MG/DL (ref 8.4–10.2)
CHLORIDE SERPL-SCNC: 106 MMOL/L (ref 97–110)
CHOLEST SERPL-MCNC: 166 MG/DL
CHOLEST/HDLC SERPL: 4.3 {RATIO}
CO2 SERPL-SCNC: 29 MMOL/L (ref 21–32)
CREAT SERPL-MCNC: 0.74 MG/DL (ref 0.51–0.95)
DEPRECATED RDW RBC: 44.4 FL (ref 39–50)
EGFRCR SERPLBLD CKD-EPI 2021: 75 ML/MIN/{1.73_M2}
EOSINOPHIL # BLD: 0.3 K/MCL (ref 0–0.5)
EOSINOPHIL NFR BLD: 4 %
ERYTHROCYTE [DISTWIDTH] IN BLOOD: 14 % (ref 11–15)
FASTING DURATION TIME PATIENT: ABNORMAL H
GLOBULIN SER-MCNC: 4.1 G/DL (ref 2–4)
GLUCOSE SERPL-MCNC: 98 MG/DL (ref 70–99)
HCT VFR BLD CALC: 44.3 % (ref 36–46.5)
HDLC SERPL-MCNC: 39 MG/DL
HGB BLD-MCNC: 13.5 G/DL (ref 12–15.5)
IMM GRANULOCYTES # BLD AUTO: 0.1 K/MCL (ref 0–0.2)
IMM GRANULOCYTES # BLD: 1 %
LDLC SERPL CALC-MCNC: 101 MG/DL
LYMPHOCYTES # BLD: 2 K/MCL (ref 1–4)
LYMPHOCYTES NFR BLD: 21 %
MAGNESIUM SERPL-MCNC: 1.9 MG/DL (ref 1.7–2.4)
MCH RBC QN AUTO: 26.6 PG (ref 26–34)
MCHC RBC AUTO-ENTMCNC: 30.5 G/DL (ref 32–36.5)
MCV RBC AUTO: 87.2 FL (ref 78–100)
MONOCYTES # BLD: 0.6 K/MCL (ref 0.3–0.9)
MONOCYTES NFR BLD: 6 %
NEUTROPHILS # BLD: 6.5 K/MCL (ref 1.8–7.7)
NEUTROPHILS NFR BLD: 67 %
NONHDLC SERPL-MCNC: 127 MG/DL
NRBC BLD MANUAL-RTO: 0 /100 WBC
PLATELET # BLD AUTO: 254 K/MCL (ref 140–450)
POTASSIUM SERPL-SCNC: 4.1 MMOL/L (ref 3.4–5.1)
PROT SERPL-MCNC: 7.4 G/DL (ref 6.4–8.2)
RBC # BLD: 5.08 MIL/MCL (ref 4–5.2)
SODIUM SERPL-SCNC: 137 MMOL/L (ref 135–145)
TRIGL SERPL-MCNC: 132 MG/DL
TSH SERPL-ACNC: 1.83 MCUNITS/ML (ref 0.35–5)
WBC # BLD: 9.5 K/MCL (ref 4.2–11)

## 2024-04-25 PROCEDURE — 83735 ASSAY OF MAGNESIUM: CPT | Performed by: CLINICAL MEDICAL LABORATORY

## 2024-04-25 PROCEDURE — 36415 COLL VENOUS BLD VENIPUNCTURE: CPT | Performed by: CLINICAL MEDICAL LABORATORY

## 2024-04-25 PROCEDURE — 85025 COMPLETE CBC W/AUTO DIFF WBC: CPT | Performed by: CLINICAL MEDICAL LABORATORY

## 2024-04-25 PROCEDURE — 80061 LIPID PANEL: CPT | Performed by: CLINICAL MEDICAL LABORATORY

## 2024-04-25 PROCEDURE — 80053 COMPREHEN METABOLIC PANEL: CPT | Performed by: CLINICAL MEDICAL LABORATORY

## 2024-04-25 PROCEDURE — 84443 ASSAY THYROID STIM HORMONE: CPT | Performed by: CLINICAL MEDICAL LABORATORY

## 2024-04-25 SDOH — HEALTH STABILITY: PHYSICAL HEALTH: ON AVERAGE, HOW MANY MINUTES DO YOU ENGAGE IN EXERCISE AT THIS LEVEL?: 10 MIN

## 2024-04-25 SDOH — HEALTH STABILITY: PHYSICAL HEALTH: ON AVERAGE, HOW MANY DAYS PER WEEK DO YOU ENGAGE IN MODERATE TO STRENUOUS EXERCISE (LIKE A BRISK WALK)?: 5 DAYS

## 2024-04-25 ASSESSMENT — PATIENT HEALTH QUESTIONNAIRE - PHQ9
SUM OF ALL RESPONSES TO PHQ9 QUESTIONS 1 AND 2: 0
1. LITTLE INTEREST OR PLEASURE IN DOING THINGS: NOT AT ALL
2. FEELING DOWN, DEPRESSED OR HOPELESS: NOT AT ALL
SUM OF ALL RESPONSES TO PHQ9 QUESTIONS 1 AND 2: 0
CLINICAL INTERPRETATION OF PHQ2 SCORE: NO FURTHER SCREENING NEEDED

## 2024-07-07 ENCOUNTER — HOSPITAL ENCOUNTER (EMERGENCY)
Facility: HOSPITAL | Age: 89
Discharge: HOME | End: 2024-07-07
Attending: EMERGENCY MEDICINE
Payer: MEDICARE

## 2024-07-07 VITALS
BODY MASS INDEX: 25 KG/M2 | RESPIRATION RATE: 18 BRPM | OXYGEN SATURATION: 93 % | WEIGHT: 140 LBS | SYSTOLIC BLOOD PRESSURE: 136 MMHG | DIASTOLIC BLOOD PRESSURE: 62 MMHG | HEART RATE: 63 BPM | TEMPERATURE: 98 F

## 2024-07-07 DIAGNOSIS — S60.222A CONTUSION OF LEFT HAND, INITIAL ENCOUNTER: Primary | ICD-10-CM

## 2024-07-07 PROCEDURE — 99282 EMERGENCY DEPT VISIT SF MDM: CPT

## 2024-07-07 PROCEDURE — 99283 EMERGENCY DEPT VISIT LOW MDM: CPT

## 2024-07-07 NOTE — ED INITIAL ASSESSMENT (HPI)
Pt from [facility] arrives via EMS after she lost her balance and fell onto her buttocks. Pt c/o pain in right hip and bruising to left thumb. Pt a/o x3

## 2024-07-08 ENCOUNTER — APPOINTMENT (OUTPATIENT)
Dept: GENERAL RADIOLOGY | Facility: HOSPITAL | Age: 89
End: 2024-07-08
Attending: EMERGENCY MEDICINE
Payer: MEDICARE

## 2024-07-08 ENCOUNTER — HOSPITAL ENCOUNTER (EMERGENCY)
Facility: HOSPITAL | Age: 89
Discharge: NURSING, REHAB OR LONG-TERM CARE | End: 2024-07-08
Attending: EMERGENCY MEDICINE
Payer: MEDICARE

## 2024-07-08 VITALS
RESPIRATION RATE: 15 BRPM | DIASTOLIC BLOOD PRESSURE: 61 MMHG | SYSTOLIC BLOOD PRESSURE: 110 MMHG | OXYGEN SATURATION: 99 % | BODY MASS INDEX: 23 KG/M2 | HEIGHT: 65 IN | HEART RATE: 65 BPM | TEMPERATURE: 99 F

## 2024-07-08 DIAGNOSIS — S70.01XA CONTUSION OF RIGHT HIP, INITIAL ENCOUNTER: Primary | ICD-10-CM

## 2024-07-08 PROCEDURE — 73502 X-RAY EXAM HIP UNI 2-3 VIEWS: CPT | Performed by: EMERGENCY MEDICINE

## 2024-07-08 PROCEDURE — 99284 EMERGENCY DEPT VISIT MOD MDM: CPT

## 2024-07-08 PROCEDURE — 99283 EMERGENCY DEPT VISIT LOW MDM: CPT

## 2024-07-08 NOTE — ED INITIAL ASSESSMENT (HPI)
Pt here last night at 1800 for fall. Pt here today with c/o pain to R hip. Unknown thinners Pt states she \"bumped her head when she fell earlier.\"

## 2024-07-08 NOTE — ED QUICK NOTES
Pt from [facility] arrives via EMS after she lost her balance and fell onto her buttocks. Pt c/o pain in right hip and bruising to left thumb. Pt a/o x3. Pt has no c/o now and is asking to return home. Daughter called per pt request.

## 2024-07-08 NOTE — ED PROVIDER NOTES
Patient Seen in: [facility] Emergency Department      History     Chief Complaint   Patient presents with    Fall     Stated Complaint: Fall    Subjective:   HPI    89-year-old female complaint of a fall.  The patient states she went to stand up and a friend of hers did so at the same time and accidentally bumped her and she fell backwards.  She denies any head injury or pain from the fall states she feels fine and think she is good to go back to the assisted care facility.  She has no specific complaints.    Objective:   Past Medical History:    A-fib ([facility])    Anesthesia complication    likes to sleep shallow breathes    Back problem    scoliosis    C. difficile diarrhea    2014, cleared per family    Calculus of kidney    Cancer ([facility])    uterine cancer    Cataract    Chronic low back pain    Congestive heart disease ([facility])    COPD (chronic obstructive pulmonary disease) ([facility])    O2 at     Diverticulitis    Diverticulosis of large intestine    Esophageal reflux    Exposure to medical diagnostic radiation    1975    Hearing impairment    bilateral hearing aids    Heart attack ([facility])    High blood pressure    High cholesterol    History of stomach ulcers    Lumbosacral spondylosis without myelopathy    Osteoporosis    Other and unspecified hyperlipidemia    Pancreatitis ([facility])    Personal history of antineoplastic chemotherapy    last tx 1975    Pneumonia due to organism    Shortness of breath    2 L/NC at     Unspecified essential hypertension    Visual impairment              Past Surgical History:   Procedure Laterality Date    Appendectomy      Cataract      Cholecystectomy  08/2019    Dr. Gaston Ortiz    Colonoscopy  07/06/2004    Diverticulosis, no polyps    Colonoscopy      Ercp - internal  11/2019    stent removal; no stones or leak    Ercp,remval stones  08/2019    CBD stones removed    Eye surgery      Fracture surgery      upper right arm, right leg    Hysterectomy  1970    Oophorectomy, for  endometrial CA    Other  HIP INJURY    orif Right hip     Upper gi endoscopy,exam                  Social History     Socioeconomic History    Marital status:    Tobacco Use    Smoking status: Former     Current packs/day: 0.00     Average packs/day: 0.1 packs/day for 60.0 years (3.6 ttl pk-yrs)     Types: Cigarettes     Start date: 1952     Quit date: 2004     Years since quittin.5    Smokeless tobacco: Never   Vaping Use    Vaping status: Never Used   Substance and Sexual Activity    Alcohol use: Yes     Alcohol/week: 1.0 standard drink of alcohol     Types: 1 Glasses of wine per week     Comment: rare    Drug use: No   Other Topics Concern    Caffeine Concern No    Exercise Yes     Social Determinants of Health     Physical Activity: Medium Risk (2024)    Received from [facility]    Exercise Vital Sign     On average, how many days per week do you engage in moderate to strenuous exercise (like a brisk walk)?: 5 days     On average, how many minutes do you engage in exercise at this level?: 10 min              Review of Systems    Positive for stated Chief Complaint: Fall    Other systems are as noted in HPI.  Constitutional and vital signs reviewed.      All other systems reviewed and negative except as noted above.    Physical Exam     ED Triage Vitals [24 1853]   /71   Pulse 73   Resp 20   Temp 97.8 °F (36.6 °C)   Temp src    SpO2 (!) 85 %   O2 Device None (Room air)       Current Vitals:   Vital Signs  BP: 141/71  Pulse: 73  Resp: 20  Temp: 97.8 °F (36.6 °C)    Oxygen Therapy  SpO2: (!) 85 %  O2 Device: None (Room air)            Physical Exam    Patient is alert orient x 3 no acute distress HEENT exam is atraumatic the no tenderness or swelling pupils are equal round react to light extract muscles are intact  Tender backs nontender lungs are clear cardiovascular exam shows regular rate and rhythm without murmurs abdomen soft nontender extremities are  atraumatic with full range of motion.  Mental status alert and oriented ×3  Cranial nerves II through XII within normal limits  Motor function is intact in all 4 extremities  Sensation is intact in all 4 extremities  Cerebellar finger-nose and heel-to-shin are normal  Deep tendon reflexes are normal  Extremities the left hand there is superficial ecchymosis over the dorsal aspect of the hand is nontender with full range of motion.  ED Course   Labs Reviewed - No data to display                   MDM      Initial differential diagnoses considered but not limited to includes head injury intracranial hemorrhage compression fracture contusion muscle spasm.    Patient appears well with no apparent injury I do not think she warrants any imaging will discharge back to home.                                   Medical Decision Making      Disposition and Plan     Clinical Impression:  No diagnosis found.     Disposition:  There is no disposition on file for this visit.  There is no disposition time on file for this visit.    Follow-up:  No follow-up provider specified.        Medications Prescribed:  Current Discharge Medication List

## 2024-07-08 NOTE — ED PROVIDER NOTES
Patient Seen in: [facility] Emergency Department      History     Chief Complaint   Patient presents with    Pain     R hip pain from fall, here last night at 1800     Stated Complaint: R hip pain, seen last night at 1800    Subjective:   HPI    89-year-old female comes to the hospital complaint of having pain by the area of her right hip.  The patient has a justin in that leg as well.  Last night at about 6 PM she was bumped into a  Residential facility and fell.  She came to the emergency room complaining of pain by the of her right hand.  This was imaged and normal and at that time she was not having discomfort in the area of her hip but noticed that when she got back to the nursing home.  She came for an evaluation of her hip.  There is no significant headaches or head trauma.  She has no neck or back pain.  She denies any chest or shortness of breath.  She is no abdominal pains.  She is no numbness weakness.  She is denying any other specific complaints at this time.  While here I did interview the patient's daughter as well to provide clarification.  The patient is a bit of a poor historian.  Objective:   Past Medical History:    A-fib (HCC)    Anesthesia complication    likes to sleep shallow breathes    Back problem    scoliosis    C. difficile diarrhea    2014, cleared per family    Calculus of kidney    Cancer (HCC)    uterine cancer    Cataract    Chronic low back pain    Congestive heart disease (HCC)    COPD (chronic obstructive pulmonary disease) (HCC)    O2 at HS    Diverticulitis    Diverticulosis of large intestine    Esophageal reflux    Exposure to medical diagnostic radiation    1975    Hearing impairment    bilateral hearing aids    Heart attack (HCC)    High blood pressure    High cholesterol    History of stomach ulcers    Lumbosacral spondylosis without myelopathy    Osteoporosis    Other and unspecified hyperlipidemia    Pancreatitis (HCC)    Personal history of antineoplastic  chemotherapy    last tx     Pneumonia due to organism    Shortness of breath    2 L/NC at HS    Unspecified essential hypertension    Visual impairment              Past Surgical History:   Procedure Laterality Date    Appendectomy      Cataract      Cholecystectomy  2019    Dr. Gaston Ortiz    Colonoscopy  2004    Diverticulosis, no polyps    Colonoscopy      Ercp - internal  2019    stent removal; no stones or leak    Ercp,remval stones  2019    CBD stones removed    Eye surgery      Fracture surgery      upper right arm, right leg    Hysterectomy  1970    Oophorectomy, for endometrial CA    Other  HIP INJURY    orif Right hip     Upper gi endoscopy,exam                  Social History     Socioeconomic History    Marital status:    Tobacco Use    Smoking status: Former     Current packs/day: 0.00     Average packs/day: 0.1 packs/day for 60.0 years (3.6 ttl pk-yrs)     Types: Cigarettes     Start date: 1952     Quit date: 2004     Years since quittin.5    Smokeless tobacco: Never   Vaping Use    Vaping status: Never Used   Substance and Sexual Activity    Alcohol use: Yes     Alcohol/week: 1.0 standard drink of alcohol     Types: 1 Glasses of wine per week     Comment: rare    Drug use: No   Other Topics Concern    Caffeine Concern No    Exercise Yes     Social Determinants of Health     Physical Activity: Medium Risk (2024)    Received from [facility]    Exercise Vital Sign     On average, how many days per week do you engage in moderate to strenuous exercise (like a brisk walk)?: 5 days     On average, how many minutes do you engage in exercise at this level?: 10 min              Review of Systems    Positive for stated Chief Complaint: Pain (R hip pain from fall, here last night at 1800)    Other systems are as noted in HPI.  Constitutional and vital signs reviewed.      All other systems reviewed and negative except as noted above.    Physical Exam     ED  Triage Vitals [07/08/24 0406]   /54   Pulse 63   Resp 18   Temp 98.6 °F (37 °C)   Temp src Oral   SpO2 93 %   O2 Device None (Room air)       Current Vitals:   Vital Signs  BP: 106/54  Pulse: 60  Resp: 20  Temp: 98.6 °F (37 °C)  Temp src: Oral    Oxygen Therapy  SpO2: 95 %  O2 Device: Nasal cannula  O2 Flow Rate (L/min): 2 L/min (sleeps with 2L on at home)            Physical Exam    HEENT : NCAT, EOMI, PEERL,  neck supple, no JVD, trachea midline, No LAD  Heart: S1S2 normal. No murmurs, regular rate and rhythm  Lungs: Clear to auscultation bilaterally  Abdomen: Soft nontender nondistended normal active bowel sounds without rebound, guarding or masses noted  Back nontender without CVA tenderness  Extremity no clubbing, cyanosis or edema noted.  Reproducible tenderness with palpation is noted over the area of the right hip.  The remaining extremities are nontender she is neurovascularly intact.  Neuro: No focal deficits noted    All measures to prevent infection transmission during my interaction with the patient were taken.  The patient was already wearing droplet mask on my arrival to the room.  Personal protective equipment including a droplet mask as well as gloves were worn throughout the duration of my exam.  Hand washing was performed prior to and after the exam.  Stethoscope and equipment used during my examination was cleaned with a super Sani cloth germicidal wipe following the exam.    ED Course     Labs Reviewed   RAINBOW DRAW LAVENDER   RAINBOW DRAW LIGHT GREEN   RAINBOW DRAW BLUE   RAINBOW DRAW GOLD          ED Course as of 07/08/24 0558  ------------------------------------------------------------  Time: 07/08 0557  Comment: While here the patient had a an x-ray done of her pelvis and right hip by my interpretation without fracture or hardware abnormality.  I reviewed the radiology report as well.              MDM      Differential diagnosis includes fracture versus contusion.  The patient's  x-rays here are normal the patient's time will be discharged home with outpatient management for hip contusion and follow-up.    Patient was screened and evaluated during this visit.   As a treating physician attending to the patient, I determined, within reasonable clinical confidence and prior to discharge, that an emergency medical condition was not or was no longer present.  There was no indication for further evaluation, treatment or admission on an emergency basis.       The usual and customary discharge instuctions were discussed given the patient's ER course.  We discussed signs and symptoms that should prompt the patient's immediate return to the emergency department.   Reasonable over the counter and prescription treatment options and Physician follow up plan was discussed.       The patient is discharged in good condition.       This note was prepared using Dragon Medical voice recognition dictation software.  As a result errors may occur.  When identified to these areas have been corrected.  While every attempt is made to correct errors during dictation discrepancies may still exist.  Please contact if there are any errors.                                   Medical Decision Making      Disposition and Plan     Clinical Impression:  1. Contusion of right hip, initial encounter         Disposition:  Discharge  7/8/2024  5:58 am    Follow-up:  Chucky Almaguer MD  [address]  [address] [address]  [phone]    Schedule an appointment as soon as possible for a visit in 2 day(s)            Medications Prescribed:  Current Discharge Medication List

## 2024-08-20 RX ORDER — LOSARTAN POTASSIUM 50 MG/1
50 TABLET ORAL 2 TIMES DAILY
Qty: 180 TABLET | Refills: 3 | Status: SHIPPED | OUTPATIENT
Start: 2024-08-20

## 2024-10-28 ENCOUNTER — APPOINTMENT (OUTPATIENT)
Dept: CARDIOLOGY | Age: 89
End: 2024-10-28

## 2024-10-28 VITALS
DIASTOLIC BLOOD PRESSURE: 74 MMHG | HEIGHT: 63 IN | HEART RATE: 67 BPM | SYSTOLIC BLOOD PRESSURE: 136 MMHG | WEIGHT: 147.71 LBS | BODY MASS INDEX: 26.17 KG/M2

## 2024-10-28 DIAGNOSIS — I34.0 NONRHEUMATIC MITRAL VALVE REGURGITATION: ICD-10-CM

## 2024-10-28 DIAGNOSIS — I10 PRIMARY HYPERTENSION: ICD-10-CM

## 2024-10-28 DIAGNOSIS — J44.9 CHRONIC OBSTRUCTIVE PULMONARY DISEASE, UNSPECIFIED COPD TYPE  (CMD): ICD-10-CM

## 2024-10-28 DIAGNOSIS — I42.0 DILATED CARDIOMYOPATHY  (CMD): Primary | ICD-10-CM

## 2024-10-28 DIAGNOSIS — I73.9 PERIPHERAL VASCULAR DISEASE (CMD): ICD-10-CM

## 2024-10-28 DIAGNOSIS — E78.5 DYSLIPIDEMIA: ICD-10-CM

## 2024-10-28 PROCEDURE — 99214 OFFICE O/P EST MOD 30 MIN: CPT | Performed by: SPECIALIST

## 2024-10-28 SDOH — HEALTH STABILITY: PHYSICAL HEALTH: ON AVERAGE, HOW MANY DAYS PER WEEK DO YOU ENGAGE IN MODERATE TO STRENUOUS EXERCISE (LIKE A BRISK WALK)?: 3 DAYS

## 2024-10-28 SDOH — HEALTH STABILITY: PHYSICAL HEALTH: ON AVERAGE, HOW MANY MINUTES DO YOU ENGAGE IN EXERCISE AT THIS LEVEL?: 30 MIN

## 2024-11-20 ENCOUNTER — INITIAL APN SNF VISIT (OUTPATIENT)
Dept: INTERNAL MEDICINE CLINIC | Age: 89
End: 2024-11-20

## 2024-11-20 VITALS
HEART RATE: 76 BPM | OXYGEN SATURATION: 93 % | TEMPERATURE: 98 F | SYSTOLIC BLOOD PRESSURE: 169 MMHG | RESPIRATION RATE: 20 BRPM | DIASTOLIC BLOOD PRESSURE: 80 MMHG

## 2024-11-20 DIAGNOSIS — A08.11 NOROVIRUS: Primary | ICD-10-CM

## 2024-11-20 DIAGNOSIS — R41.0 CONFUSION: ICD-10-CM

## 2024-11-20 DIAGNOSIS — R53.1 WEAKNESS: ICD-10-CM

## 2024-11-20 DIAGNOSIS — I48.0 PAROXYSMAL ATRIAL FIBRILLATION (HCC): Chronic | ICD-10-CM

## 2024-11-20 DIAGNOSIS — J84.10 PULMONARY FIBROSIS (HCC): ICD-10-CM

## 2024-11-20 DIAGNOSIS — E78.00 HYPERCHOLESTEREMIA: Chronic | ICD-10-CM

## 2024-11-20 DIAGNOSIS — I10 PRIMARY HYPERTENSION: Chronic | ICD-10-CM

## 2024-11-20 PROCEDURE — 99310 SBSQ NF CARE HIGH MDM 45: CPT | Performed by: NURSE PRACTITIONER

## 2024-11-20 PROCEDURE — 1123F ACP DISCUSS/DSCN MKR DOCD: CPT | Performed by: NURSE PRACTITIONER

## 2024-11-20 RX ORDER — MAGNESIUM OXIDE 400 MG/1
400 TABLET ORAL DAILY
COMMUNITY

## 2024-11-20 NOTE — PROGRESS NOTES
Skilled Nursing Facility, Subacute Rehab  [facility]     Viviana Cortes Author: Maribell Anderson TRUPTI     1930 MRN [id number]   Last Hospital  Admission 24      Last Hospital Discharge 24 PCP Chucky Almaguer MD     Replaced by [facility] 11/15-24     Hospital Discharge Diagnoses:  Gastroenteritis   Norovirus   A fib   Hypertension       HPI:  Viviana Cortes  : 1930, Age 89 year old  female patient is admitted for subacute rehab. Patient has a past medical history of hypertension, a fib, pulm fibrosis/copd. She came to hospital with nausea/vomiting/diarrhea- she was found to have Norovirus. She was treated with conservative treatment and IVF.   Admitting to [facility] for nursing care, medication management, and PT/OT/ST eval and tx.    Chief Complaint at visit:   Chief Complaint   Patient presents with    Follow - Up      Patient seen for initial APN eval   No family currently present   Staff states patient was up all night screaming - in house psych to eval. No mention of severe confusion in hospital records- check UA C & S.   She is seen in her bed sleeping - appears comfortable and in no acute distress  O2 in place at 2 L NC- breathing non labored and easy   No longer having nausea, vomiting or diarrhea. Had 2 Bms today but solid       ALLERGIES    She is allergic to ciprofloxacin and doxycycline.      CURRENT MEDS:    Current Outpatient Medications on File Prior to Visit   Medication Sig    losartan 50 MG Oral Tab Take 1 tablet (50 mg total) by mouth 2 (two) times daily.    bacitracin 500 UNIT/GM External Ointment Apply to Nasal Rim twice daily for a month, repeat as necessary (Patient not taking: Reported on 2023)    mirtazapine 7.5 MG Oral Tab Take 1 tablet (7.5 mg total) by mouth nightly. (Patient not taking: Reported on 2023)    docusate sodium 100 MG Oral Cap Take 100 mg by mouth 2 (two) times daily as needed for constipation. (Patient not taking: Reported  on 4/9/2023)    lidocaine-menthol 4-1 % External Patch Place 1 patch onto the skin daily. (Patient not taking: Reported on 4/9/2023)    acetaminophen 500 MG Oral Tab Take 2 tablets (1,000 mg total) by mouth every 6 (six) hours as needed for Pain.    Metoprolol Succinate ER 25 MG Oral Tablet 24 Hr Take 1 tablet (25 mg total) by mouth daily.    Calcium Carbonate-Vitamin D 600-400 MG-UNIT Oral Tab Take 1 tablet by mouth 2 (two) times daily.     No current facility-administered medications on file prior to visit.         HISTORY:    She  has a past medical history of A-fib ([facility]), Anesthesia complication, Back problem, C. difficile diarrhea, Calculus of kidney, Cancer ([facility]) (1975), Cataract, Chronic low back pain, Congestive heart disease ([facility]), COPD (chronic obstructive pulmonary disease) ([facility]), Diverticulitis, Diverticulosis of large intestine, Esophageal reflux, Exposure to medical diagnostic radiation, Hearing impairment, Heart attack ([facility]), High blood pressure, High cholesterol, History of stomach ulcers, Lumbosacral spondylosis without myelopathy, Osteoporosis, Other and unspecified hyperlipidemia, Pancreatitis ([facility]), Personal history of antineoplastic chemotherapy, Pneumonia due to organism, Shortness of breath, Unspecified essential hypertension, and Visual impairment.    She  has a past surgical history that includes hysterectomy (1970); colonoscopy (07/06/2004); other (HIP INJURY); cataract; Eye surgery; appendectomy; colonoscopy; upper gi endoscopy,exam; ercp,remval stones (08/2019); Fracture surgery; cholecystectomy (08/2019); and ercp - internal (11/2019).        CODE STATUS:  DNR    ADVANCED CARE PLANNING TEAM:  Pending further course at subacute rehab     SUBJECTIVE:    REVIEW OF SYSTEMS:  Not able to eval patient confused and currently sleeping     OBJECTIVE:  VITALS:  BP (!) 169/80   Pulse 76   Temp 97.6 °F (36.4 °C)   Resp 20   SpO2 93%     PHYSICAL EXAM:  GENERAL HEALTH: 89 year old female patient  no acute distress   LINES, TUBES, DRAINS:  none  SKIN: no rashes, no suspicious lesions  WOUND: see wound assessment   EYES: PERRLA, conjunctiva normal; no drainage from eyes  HENT: normocephalic; normal nose, no nasal drainage, mucous membranes pink, moist  RESPIRATORY:Diminished, No wheezing/cough/accessory muscle use;2 L NC   CARDIOVASCULAR: S1, S2 normal, RRR; no S3, no S4;   ABDOMEN: normal active BS+, soft, non-distended; no apparent masses; observed, non-tender, no guarding during physical exam  : Deferred  MUSCULOSKELETAL: no acute synovitis upper or lower extremity.  Weakness R/T recent hospitalization/diagnoses/sequelae; will undergo therapies to rehab and improve strength, endurance and independence w/ ADLs as able  EXTREMITIES/VASCULAR: no cyanosis, clubbing or edema  NEUROLOGIC: intact; no sensorimotor deficit  PSYCHIATRIC: alert,  confused     DIAGNOSTICS REVIEWED AT THIS VISIT:  Vital signs reviewed in [facility] EMR.  [facility] medical records, notes, lab and imaging results reviewed. And diagnostics available in rehab records/[facility].  Medication reconciliation completed.      11/20/24  Wbc 9.75 hgb 12.6 hematocrit 39.8 plt 239   Glucose 89 bun 13 creat 0.70 bili 0.77 prot 5.8 alb 3.2 sodium 145 potassium 3.8 chl 106 co2 30 alt 14 ast 22 alk phos 49 ca 8.5 gfr > 60     Lab Results   Component Value Date     (H) 02/20/2021    BUN 12 02/20/2021    BUNCREA 15.6 02/20/2021    CREATSERUM 0.77 02/20/2021    ANIONGAP 5 02/20/2021    GFR 69 08/03/2017    GFRNAA 68 02/20/2021    GFRAA 79 02/20/2021    CA 8.5 02/20/2021    OSMOCALC 293 02/20/2021    ALKPHO 87 11/18/2020    AST 15 11/18/2020    ALT 16 11/18/2020    BILT 0.8 11/18/2020    TP 7.4 11/18/2020    ALB 3.3 (L) 11/18/2020    GLOBULIN 4.1 11/18/2020    AGRATIO 1.3 09/16/2014     02/20/2021    K 3.0 (L) 02/20/2021     02/20/2021    CO2 30.0 02/20/2021       Lab Results   Component Value Date    WBC 9.4 02/20/2021    RBC 4.84  02/20/2021    HGB 12.9 02/20/2021    HCT 40.8 02/20/2021    .0 02/20/2021    MCV 84.3 02/20/2021    MCH 26.7 02/20/2021    MCHC 31.6 02/20/2021    RDW 14.5 02/20/2021    NEPRELIM 6.31 02/20/2021    NEPERCENT 67.2 02/20/2021    LYPERCENT 23.0 02/20/2021    MOPERCENT 6.2 02/20/2021    EOPERCENT 2.2 02/20/2021    BAPERCENT 1.3 02/20/2021    NE 6.31 02/20/2021    LYMABS 2.16 02/20/2021    MOABSO 0.58 02/20/2021    EOABSO 0.21 02/20/2021    BAABSO 0.12 02/20/2021                XR HIP W OR WO PELVIS 2 OR 3 VIEWS, RIGHT (CPT=73502)  Narrative: PROCEDURE:  XR HIP W OR WO PELVIS 2 OR 3 VIEWS, RIGHT ( CPT=73502)     TECHNIQUE:  Unilateral 2 to 3 views of the hip and pelvis if performed.     COMPARISON:  None.     INDICATIONS:  R hip pain, seen last night at 1800     PATIENT STATED HISTORY: (As transcribed by Technologist)           FINDINGS:                     Impression: CONCLUSION:       Intramedullary justin in the right femur is noted.  Intramedullary nail in the right femoral head and neck is noted.     An acute fracture or dislocation is not identified.  Moderate loss of right hip joint space is noted.  Right acetabular osteophyte is noted.     Agree with preliminary radiology report from [facility].           LOCATION:  [facility]        Dictated by (CST): Shubham Iglesias MD on 7/08/2024 at 7:20 AM       Finalized by (CST): Shubham Iglesias MD on 7/08/2024 at 7:21 AM              SEE PLAN BELOW  Physical Deconditioning/Impaired mobility and ADLs/At risk for falling  Fall Precautions  PT/OT/ST to evaluate and treat  [facility] team to establish care plan meeting with patient and POA/family as appropriate  Anticipate DC on or before TBD; SW to assist patient/family w/ DC planning  DC Plan:  TBD     Agitated/confused   Check UA C & S   In house Psych to follow     Gastroenteritis/Norovirus  Nausea/diarrhea and vomiting prior to admission. Given IVF. Given supportive care   11/20 nurse reports 2 regular bowel  movements today      A fib/HTN/HL   In hospital 11/18 Echo EF 40-45%. Elevated Trop in hospital likely related to rapid ventricular response A fib- given age, known diagnosis of dilated nonischemic cardiomyopathy no plans for invasive ischemic investigation   Vital signs q shift and prn   Labs weekly and prn   Losartan 50 mg bid   Metoprolol succinate 25 mg daily     Pulm fibrosis   Monitor symptoms   Pulm/RT to follow as needed   O2 as needed to keep saturation above 93%. Wears 2-3 L NC.     DVT Prophylaxis   Encourage early mobilization and participation in PT/OT as able      Pain Management  Offer to pre-medicate 30-60 min prior to therapy  Physiatry evaluation with management appreciated  Acetaminophen every 6 hrs prn     Bowel Management Regimen  Monitor BM status      Vitamins/supplements as r/t deficiencies  [facility] to follow while in rehab; supplementation/diet as per [facility] RD  May continue home supplements  Calcium Vit d supplement   Mag 400 mg daily     LABS  CBC/CMP weekly while in [facility]- 11/27     *Follow-Up with PCP within 1-2 weeks following [facility] discharge.   *Follow-Up with specialists as recommended.  No future appointments.        *Greater than 65 minutes spent w/ patient and family, reviewing medical records, labs, completing medication reconciliation and entering orders to establish plan of care in [facility].    Maribell Anderson, APRN  11/20/24

## 2024-11-25 ENCOUNTER — SNF VISIT (OUTPATIENT)
Dept: INTERNAL MEDICINE CLINIC | Age: 89
End: 2024-11-25

## 2024-11-25 VITALS
OXYGEN SATURATION: 93 % | HEART RATE: 77 BPM | SYSTOLIC BLOOD PRESSURE: 134 MMHG | TEMPERATURE: 98 F | DIASTOLIC BLOOD PRESSURE: 78 MMHG | RESPIRATION RATE: 18 BRPM

## 2024-11-25 DIAGNOSIS — A08.11 NOROVIRUS: Primary | ICD-10-CM

## 2024-11-25 DIAGNOSIS — J84.10 PULMONARY FIBROSIS (HCC): ICD-10-CM

## 2024-11-25 DIAGNOSIS — R53.1 WEAKNESS: ICD-10-CM

## 2024-11-25 DIAGNOSIS — E78.00 HYPERCHOLESTEREMIA: ICD-10-CM

## 2024-11-25 DIAGNOSIS — I48.0 PAROXYSMAL ATRIAL FIBRILLATION (HCC): ICD-10-CM

## 2024-11-25 DIAGNOSIS — I10 PRIMARY HYPERTENSION: ICD-10-CM

## 2024-11-25 NOTE — PROGRESS NOTES
Viviana Cortes, 1930, 89 year old, female    [facility] 11/15-24      Hospital Discharge Diagnoses:  Gastroenteritis   Norovirus   A fib   Hypertension         HPI:  Viviana Cortes  : 1930, Age 89 year old  female patient is admitted for subacute rehab. Patient has a past medical history of hypertension, a fib, pulm fibrosis/copd. She came to hospital with nausea/vomiting/diarrhea- she was found to have Norovirus. She was treated with conservative treatment and IVF.   Admitting to [facility] for nursing care, medication management, and PT/OT/ST eval and tx.       Chief Complaint:    Chief Complaint   Patient presents with    Follow - Up        Subjective:   Patient seen for follow up visit today   She is sitting up eating breakfast- she is wanting Rice Krispies and milk   Denies any current pain   She is alert but confused.   Has shortness of breath and cough at baseline on 2 L NC. In house Pulm/RT follow   No cardiac symptoms   No abdominal symptoms- per records in [facility] no longer having any vomiting/diarrhea. Had been treated in hospital for gastroenteritis/Norovirus- isolation now discontinued       Objective:  /78   Pulse 77   Temp 98.1 °F (36.7 °C)   Resp 18   SpO2 93%   2 L NC     PHYSICAL EXAM:  GENERAL HEALTH: 89 year old female patient no acute distress   LINES, TUBES, DRAINS:  none  SKIN: no rashes, no suspicious lesions  WOUND: see wound assessment   EYES: PERRLA, conjunctiva normal; no drainage from eyes  HENT: normocephalic; normal nose, no nasal drainage, mucous membranes pink, moist  RESPIRATORY:Diminished, + mild cough- chronic No wheezing/accessory muscle use;2 L NC   CARDIOVASCULAR: S1, S2 normal, RRR; no S3, no S4;   ABDOMEN: normal active BS+, soft, non-distended; no apparent masses; observed, non-tender, no guarding during physical exam  : Deferred  MUSCULOSKELETAL: no acute synovitis upper or lower extremity.  Weakness R/T recent  hospitalization/diagnoses/sequelae; will undergo therapies to rehab and improve strength, endurance and independence w/ ADLs as able  EXTREMITIES/VASCULAR: no cyanosis, clubbing or edema  NEUROLOGIC: intact; no sensorimotor deficit  PSYCHIATRIC: alert,  confused         Medications reviewed: Yes      Current Outpatient Medications:     magnesium oxide 400 MG Oral Tab, Take 1 tablet (400 mg total) by mouth daily., Disp: , Rfl:     losartan 50 MG Oral Tab, Take 1 tablet (50 mg total) by mouth 2 (two) times daily., Disp: , Rfl:     bacitracin 500 UNIT/GM External Ointment, Apply to Nasal Rim twice daily for a month, repeat as necessary (Patient not taking: Reported on 4/9/2023), Disp: 1 Tube, Rfl: 3    mirtazapine 7.5 MG Oral Tab, Take 1 tablet (7.5 mg total) by mouth nightly. (Patient not taking: Reported on 4/9/2023), Disp: 30 tablet, Rfl: 5    docusate sodium 100 MG Oral Cap, Take 100 mg by mouth 2 (two) times daily as needed for constipation. (Patient not taking: Reported on 4/9/2023), Disp: 30 capsule, Rfl: 0    lidocaine-menthol 4-1 % External Patch, Place 1 patch onto the skin daily. (Patient not taking: Reported on 4/9/2023), Disp: 30 patch, Rfl: 0    acetaminophen 500 MG Oral Tab, Take 2 tablets (1,000 mg total) by mouth every 6 (six) hours as needed for Pain., Disp: , Rfl:     Metoprolol Succinate ER 25 MG Oral Tablet 24 Hr, Take 1 tablet (25 mg total) by mouth daily., Disp: , Rfl:     Calcium Carbonate-Vitamin D 600-400 MG-UNIT Oral Tab, Take 1 tablet by mouth 2 (two) times daily., Disp: , Rfl:       Diagnostics reviewed:    11/20/24  Wbc 9.75 hgb 12.6 hematocrit 39.8 plt 239   Glucose 89 bun 13 creat 0.70 bili 0.77 prot 5.8 alb 3.2 sodium 145 potassium 3.8 chl 106 co2 30 alt 14 ast 22 alk phos 49 ca 8.5 gfr > 60    Therapy update 11/25/24  Bed Mobility Min/Sup   Gait/distance 10 ft Min A   Transfers Min A   Grooming SBA   UE dress CGA   LE dress Min A   Bathing CGA  Toileting Min A      Assessment and  plan:  Physical Deconditioning/Impaired mobility and ADLs/At risk for falling  Fall Precautions  PT/OT/ST to evaluate and treat  [facility] team to establish care plan meeting with patient and POA/family as appropriate  Anticipate DC on or before TBD; SW to assist patient/family w/ DC planning. Lives at [facility].   DC Plan:  TBD     Agitated/confused   Check UA C & S   In house Psych to follow      Gastroenteritis/Norovirus  Nausea/diarrhea and vomiting prior to admission. Given IVF. Given supportive care   11/20 nurse reports 2 regular bowel movements today   11/25 no longer on isolation. Nausea/vomiting/diarrhea symptoms all have resolved.       A fib/HTN/HL   In hospital 11/18 Echo EF 40-45%. Elevated Trop in hospital likely related to rapid ventricular response A fib- given age, known diagnosis of dilated nonischemic cardiomyopathy no plans for invasive ischemic investigation   Vital signs q shift and prn   Labs weekly and prn   Losartan 50 mg bid   Metoprolol succinate 25 mg daily      Pulm fibrosis   Monitor symptoms   Pulm/RT to follow as needed   O2 as needed to keep saturation above 93%. Wears 2-3 L NC.      DVT Prophylaxis   Encourage early mobilization and participation in PT/OT as able        Pain Management  Offer to pre-medicate 30-60 min prior to therapy  Physiatry evaluation with management appreciated  Acetaminophen every 6 hrs prn      Bowel Management Regimen  Monitor BM status   11/25 diarrhea now resolved from Norovirus. Continue to monitor      Vitamins/supplements as r/t deficiencies  [facility] RD to follow while in rehab; supplementation/diet as per [facility] RD  May continue home supplements  Calcium Vit d supplement   Mag 400 mg daily      LABS  CBC/CMP weekly while in [facility]- 11/27      *Follow-Up with PCP within 1-2 weeks following [facility] discharge.   *Follow-Up with specialists as recommended.  No future appointments.      This is a 45 minute visit and greater than 50% of the time was  spent counseling the patient and/or coordinating care.        Note to patient: The 21st Century Cures Act makes medical notes like these available to patients in the interest of transparency. However, this is a medical document intended as peer to peer communication. It is written in medical language and may contain abbreviations or verbiage that are unfamiliar. It may appear blunt or direct. Medical documents are intended to carry relevant information, facts as evident, and the clinical opinion of the practitioner who signs the document.    Maribell Anderson, APRN  11/25/2024

## 2024-12-02 ENCOUNTER — SNF VISIT (OUTPATIENT)
Dept: INTERNAL MEDICINE CLINIC | Age: 89
End: 2024-12-02

## 2024-12-02 VITALS
HEART RATE: 70 BPM | OXYGEN SATURATION: 91 % | RESPIRATION RATE: 12 BRPM | DIASTOLIC BLOOD PRESSURE: 59 MMHG | SYSTOLIC BLOOD PRESSURE: 124 MMHG | TEMPERATURE: 97 F

## 2024-12-02 DIAGNOSIS — I10 PRIMARY HYPERTENSION: ICD-10-CM

## 2024-12-02 DIAGNOSIS — E78.00 HYPERCHOLESTEREMIA: ICD-10-CM

## 2024-12-02 DIAGNOSIS — E87.6 HYPOKALEMIA: ICD-10-CM

## 2024-12-02 DIAGNOSIS — I48.0 PAROXYSMAL ATRIAL FIBRILLATION (HCC): ICD-10-CM

## 2024-12-02 DIAGNOSIS — N39.0 URINARY TRACT INFECTION WITHOUT HEMATURIA, SITE UNSPECIFIED: Primary | ICD-10-CM

## 2024-12-02 DIAGNOSIS — R53.1 WEAKNESS: ICD-10-CM

## 2024-12-02 DIAGNOSIS — A08.11 NOROVIRUS: ICD-10-CM

## 2024-12-02 DIAGNOSIS — R73.9 HYPERGLYCEMIA: ICD-10-CM

## 2024-12-02 DIAGNOSIS — J84.10 PULMONARY FIBROSIS (HCC): ICD-10-CM

## 2024-12-02 RX ORDER — POLYETHYLENE GLYCOL 3350 17 G/17G
17 POWDER, FOR SOLUTION ORAL DAILY PRN
COMMUNITY

## 2024-12-02 RX ORDER — SENNA AND DOCUSATE SODIUM 50; 8.6 MG/1; MG/1
1 TABLET, FILM COATED ORAL 2 TIMES DAILY
COMMUNITY

## 2024-12-02 RX ORDER — NITROFURANTOIN 25; 75 MG/1; MG/1
100 CAPSULE ORAL 2 TIMES DAILY
COMMUNITY
Start: 2024-12-01 | End: 2024-12-08

## 2024-12-02 NOTE — PROGRESS NOTES
Viviana Cortes, 1930, 89 year old, female    [facility] 11/15-24      Hospital Discharge Diagnoses:  Gastroenteritis   Norovirus   A fib   Hypertension         HPI:  Viviana Cortes  : 1930, Age 89 year old  female patient is admitted for subacute rehab. Patient has a past medical history of hypertension, a fib, pulm fibrosis/copd. She came to hospital with nausea/vomiting/diarrhea- she was found to have Norovirus. She was treated with conservative treatment and IVF.   Admitting to [facility] for nursing care, medication management, and PT/OT/ST eval and tx.       Chief Complaint:    Chief Complaint   Patient presents with    Follow - Up        Subjective:   Patient seen for follow up   Patient is sitting up in recliner on and off sleeping   She opens her eyes and responds to questions   She is alert but disoriented not able to tell me year, location, or president.   Denies any current pain   Has shortness of breath and cough at baseline on 2 L NC. In house Pulm/RT follow   No cardiac symptoms   No longer having abdominal symptoms.   Had been treated in hospital for gastroenteritis/Norovirus-    Lab work today showed slightly low potassium at 3.6 however patient had prior critical potassium level- start potassium 20 meq daily x 2 days repeat labs   Glucose elevated on labs today at 135 no diabetes history- will start daily accu checks      She is from [facility] in [place]   She is min-Sup with Bed mobility, ambulating 10 ft min A , transfers -Min/CGA. Family came in for training       Objective:  /59   Pulse 70   Temp 97.1 °F (36.2 °C)   Resp 12   SpO2 91%   2 L NC     PHYSICAL EXAM:  GENERAL HEALTH: 89 year old female patient no acute distress   LINES, TUBES, DRAINS:  none  SKIN: no rashes, no suspicious lesions  WOUND: see wound assessment   EYES: PERRLA, conjunctiva normal; no drainage from eyes  HENT: normocephalic; normal nose, no nasal drainage, mucous  membranes pink, moist  RESPIRATORY:Diminished, No wheezing/cough/accessory muscle use;2 L NC   CARDIOVASCULAR: S1, S2 normal, RRR; no S3, no S4;   ABDOMEN: normal active BS+, soft, non-distended; no apparent masses; observed, non-tender, no guarding during physical exam  : Deferred  MUSCULOSKELETAL: no acute synovitis upper or lower extremity.  Weakness R/T recent hospitalization/diagnoses/sequelae; will undergo therapies to rehab and improve strength, endurance and independence w/ ADLs as able  EXTREMITIES/VASCULAR: no cyanosis, clubbing or edema  NEUROLOGIC: intact; no sensorimotor deficit  PSYCHIATRIC: alert,  confused         Medications reviewed: Yes      Current Outpatient Medications:     polyethylene glycol, PEG 3350, 17 g Oral Powd Pack, Take 17 g by mouth daily as needed (constipation)., Disp: , Rfl:     senna-docusate 8.6-50 MG Oral Tab, Take 1 tablet by mouth in the morning and 1 tablet before bedtime., Disp: , Rfl:     nitrofurantoin monohydrate macro 100 MG Oral Cap, Take 1 capsule (100 mg total) by mouth 2 (two) times daily., Disp: , Rfl:     magnesium oxide 400 MG Oral Tab, Take 1 tablet (400 mg total) by mouth daily., Disp: , Rfl:     losartan 50 MG Oral Tab, Take 1 tablet (50 mg total) by mouth 2 (two) times daily., Disp: , Rfl:     bacitracin 500 UNIT/GM External Ointment, Apply to Nasal Rim twice daily for a month, repeat as necessary (Patient not taking: Reported on 12/2/2024), Disp: 1 Tube, Rfl: 3    mirtazapine 7.5 MG Oral Tab, Take 1 tablet (7.5 mg total) by mouth nightly. (Patient not taking: Reported on 12/2/2024), Disp: 30 tablet, Rfl: 5    lidocaine-menthol 4-1 % External Patch, Place 1 patch onto the skin daily. (Patient not taking: Reported on 12/2/2024), Disp: 30 patch, Rfl: 0    acetaminophen 500 MG Oral Tab, Take 2 tablets (1,000 mg total) by mouth every 6 (six) hours as needed for Pain., Disp: , Rfl:     Metoprolol Succinate ER 25 MG Oral Tablet 24 Hr, Take 1 tablet (25 mg total)  by mouth daily., Disp: , Rfl:     Calcium Carbonate-Vitamin D 600-400 MG-UNIT Oral Tab, Take 1 tablet by mouth 2 (two) times daily., Disp: , Rfl:       Diagnostics reviewed:    12/2/24  Wbc 9.66 hgb 13.3 hematocrit 43.4 plt 325   Glucose 135 bun 15 creat 0.97 bili 0.74 prot 6.6 alb 3.6 sodium 145 potassium 3.6 chl 101 co2 29 alt 15 ast 22 alk phos 70 ca 9.5 gfr 53      11/20/24  Wbc 9.75 hgb 12.6 hematocrit 39.8 plt 239   Glucose 89 bun 13 creat 0.70 bili 0.77 prot 5.8 alb 3.2 sodium 145 potassium 3.8 chl 106 co2 30 alt 14 ast 22 alk phos 49 ca 8.5 gfr > 60    Therapy update 12/2/24  Bed Mobility Min/Sup   Gait/distance 10 ft Min A   Transfers Min A /CGA  Grooming SBA   UE dress CGA   LE dress Min A   Bathing CGA  Toileting Min A      Assessment and plan:  Physical Deconditioning/Impaired mobility and ADLs/At risk for falling  Fall Precautions  PT/OT/ST to evaluate and treat  [facility] team to establish care plan meeting with patient and POA/family as appropriate  Anticipate DC on or before TBD; SW to assist patient/family w/ DC planning. Lives at [facility].   DC Plan:  TBD     Urinary Tract Infection   Urine culture 10-50,000 colonies/ml E coli, 10-50,000 colonies/ml pseudomonas aeruginosa   Macrobid 100 mg bid- 12/1-12/8/24  In house ID to follow      Gastroenteritis/Norovirus  Nausea/diarrhea and vomiting prior to admission. Given IVF. Given supportive care   11/20 nurse reports 2 regular bowel movements today   11/25 no longer on isolation. Nausea/vomiting/diarrhea symptoms all have resolved  12/2 no abdominal symptoms      Hyperglycemia   12/2 glucose 135   Check daily accu check fasting     Hypokalemia   11/20 potassium 3.8 --> 11/27 potassium 2.9 --> 11/29 potassium 3.8 --> 12/2 potassium 3.6  11/27 given potassium 40 meq bid then 11/28 potassium 20 meq on 11/28 12/2 give potassium 20 meq daily x 2 days   Monitor labs     A fib/HTN/HL   In hospital 11/18 Echo EF 40-45%. Elevated Trop in  hospital likely related to rapid ventricular response A fib- given age, known diagnosis of dilated nonischemic cardiomyopathy no plans for invasive ischemic investigation   Vital signs q shift and prn   Labs weekly and prn   Losartan 50 mg bid   Metoprolol succinate 25 mg daily      Pulm fibrosis   Monitor symptoms   Pulm/RT to follow as needed   O2 as needed to keep saturation above 93%. Wears 2-3 L NC.      DVT Prophylaxis   Encourage early mobilization and participation in PT/OT as able        Pain Management  Offer to pre-medicate 30-60 min prior to therapy  Physiatry evaluation with management appreciated  Acetaminophen every 6 hrs prn      Bowel Management Regimen  Monitor BM status   11/25 diarrhea now resolved from Norovirus. Continue to monitor   Miralax 17 gm daily prn   Senna S 1 tab bid      Vitamins/supplements as r/t deficiencies  [facility] RD to follow while in rehab; supplementation/diet as per [facility] RD  May continue home supplements  Calcium Vit d supplement   Mag 400 mg daily      LABS  CBC/CMP weekly while in [facility]- 12/9     *Follow-Up with PCP within 1-2 weeks following [facility] discharge.   *Follow-Up with specialists as recommended.  No future appointments.            This is a 45 minute visit and greater than 50% of the time was spent counseling the patient and/or coordinating care.        Note to patient: The 21st Century Cures Act makes medical notes like these available to patients in the interest of transparency. However, this is a medical document intended as peer to peer communication. It is written in medical language and may contain abbreviations or verbiage that are unfamiliar. It may appear blunt or direct. Medical documents are intended to carry relevant information, facts as evident, and the clinical opinion of the practitioner who signs the document.    Maribell Anderson, APRN  12/2/24

## 2024-12-05 ENCOUNTER — SNF VISIT (OUTPATIENT)
Dept: INTERNAL MEDICINE CLINIC | Age: 89
End: 2024-12-05

## 2024-12-05 DIAGNOSIS — N39.0 URINARY TRACT INFECTION WITHOUT HEMATURIA, SITE UNSPECIFIED: Primary | ICD-10-CM

## 2024-12-05 DIAGNOSIS — A08.11 NOROVIRUS: ICD-10-CM

## 2024-12-05 DIAGNOSIS — R53.1 WEAKNESS: ICD-10-CM

## 2024-12-05 DIAGNOSIS — I10 PRIMARY HYPERTENSION: ICD-10-CM

## 2024-12-05 DIAGNOSIS — I48.0 PAROXYSMAL ATRIAL FIBRILLATION (HCC): ICD-10-CM

## 2024-12-05 DIAGNOSIS — R41.0 CONFUSION: ICD-10-CM

## 2024-12-05 PROCEDURE — 99307 SBSQ NF CARE SF MDM 10: CPT | Performed by: NURSE PRACTITIONER

## 2024-12-06 VITALS
TEMPERATURE: 97 F | OXYGEN SATURATION: 94 % | RESPIRATION RATE: 18 BRPM | SYSTOLIC BLOOD PRESSURE: 125 MMHG | DIASTOLIC BLOOD PRESSURE: 59 MMHG | HEART RATE: 65 BPM

## 2024-12-06 NOTE — PROGRESS NOTES
Viviana Cortes, 1930, 89 year old, female    Chief Complaint   Patient presents with    Follow - Up     H/o UTI        Subjective:  Viviana Cortes : 1930, Age 89 year old female patient is admitted for subacute rehab. Patient has a past medical history of hypertension, a fib, pulm fibrosis/copd. She came to hospital with nausea/vomiting/diarrhea- she was found to have Norovirus. She was treated with conservative treatment and IVF. Admitting to [facility] for nursing care, medication management, and PT/OT/ST eval and tx.     Today:  Seen in room with no complaints at present.      Therapy update: 24  Bed mobility--Min/Supervision  Transfers--Min/CGA  Ambulation--10 ft Min a  UE ADLs--CGA  LE ADLs--Min a  Toileting--Min a  Bathing--CGA    Denies insomnia, fatigue, fever/chills, cough, SOB, dyspnea, angina, palpitations, n/v, diarrhea, constipation, and urinary sxs.    Objective:  /59   Pulse 65   Temp 97.1 °F (36.2 °C)   Resp 18   SpO2 94%   PHYSICAL EXAM:  GENERAL HEALTH: 89 year old female patient no acute distress   LINES, TUBES, DRAINS:  none  SKIN: no rashes, no suspicious lesions  WOUND: see wound assessment   EYES: PERRLA, conjunctiva normal; no drainage from eyes  HENT: normocephalic; normal nose, no nasal drainage, mucous membranes pink, moist  RESPIRATORY:Diminished, No wheezing/cough/accessory muscle use;1.5 L NC with Sats of 94%  CARDIOVASCULAR: S1, S2 normal, RRR; no S3, no S4;   ABDOMEN: normal active BS+, soft, non-distended; no apparent masses; observed, non-tender, no guarding during physical exam  : Deferred  MUSCULOSKELETAL: no acute synovitis upper or lower extremity.  Weakness R/T recent hospitalization/diagnoses/sequelae; will undergo therapies to rehab and improve strength, endurance and independence w/ ADLs as able  EXTREMITIES/VASCULAR: no cyanosis, clubbing or edema  NEUROLOGIC: intact; no sensorimotor deficit  PSYCHIATRIC: alert,  confused     Medications  reviewed: Yes    Diagnostics reviewed:    Dated:  12/4/24    CBC W/DIFF AND PLATELETS  WHITE BLOOD CELLS 8.98 THO/mm3 4.80 - 10.80 Final  RED BLOOD CELLS 5.19 MIL/mm3 4.20 - 5.40 Final  HEMOGLOBIN 13.9 g/dL 12.0 - 16.0 Final  HEMATOCRIT 46.4 % 37.0 - 47.0 Final  MCV 89.4 fL 81.0 - 99.0 Final  MCH 26.8 pg 27.0 - 33.0 L Final  MCHC 30.0 g/dL 32.0 - 36.0 L Final  RDW-CV 14.9 % 11.5 - 15.2 Final  PLATELET COUNT 290 THO/mm3 150 - 400 Final  MPV 11.6 fL 9.5 - 13.1 Final  NEUTROPHILS, ABS 6.40 THO/mm3 1.40 - 6.80 Final  LYMPHOCYTES, ABS 1.82 THO/mm3 0.80 - 3.00 Final  MONOCYTES, ABS 0.38 THO/mm3 0.20 - 1.00 Final  EOSINOPHILS, ABS 0.24 THO/mm3 0.00 - 0.50 Final  BASOPHILS, ABS 0.11 THO/mm3 0.00 - 0.10 H Final  IMMATURE GRANS, ABS 0.03 THO/mm3 0.00 - 0.10 Final  NEUTROPHILS % 71.3 % Final  LYMPHOCYTES % 20.3 % Final  MONOCYTES % 4.2 % Final  EOSINOPHILS % 2.7 % Final  BASOPHILS % 1.2 % Final  IMMATURE GRANS % 0.3 % Final    COMPREHENSIVE METABOLIC  GLUCOSE 114 mg/dL 70 - 110 H Final  Fasting glucose levels between 100-125 mg/dL are  associated with an increased risk for diabetes.  BUN 15 mg/dL 7 - 28 Final  CREATININE 1.01 mg/dL 0.44 - 1.32 Final  BILIRUBIN, TOTAL 0.74 mg/dL 0.16 - 1.30 Final  PROTEIN, TOTAL 7.1 g/dL 5.6 - 8.2 Final  ALBUMIN 3.8 g/dL 3.2 - 4.9 Final  SODIUM 142 mEq/L 138 - 147 Final  POTASSIUM 3.9 mEq/L 3.6 - 5.0 Final  Hemolysis detected. Hemolysis may increase Potassium  (K+) 0.075 mEq/L to 0.45 mEq/L.  CHLORIDE 103 mEq/L 99 - 110 Final  CARBON DIOXIDE 29 mmol/L 18 - 30 Final  SGPT(ALT) 19 U/L 0 - 42 Final  SGOT(AST) 28 U/L 9 - 35 Final  ALK. PHOSPHATASE 78 U/L 34 - 143 Final  CALCIUM 9.6 mg/dL 8.7 - 10.5 Final  eGFR If African American > 60 mL/m/1.73m  2  >=60 Final  eGFR If Non- 51 mL/m/1.73m  2  >=60 L Final    Assessment and plan:  Physical Deconditioning/Impaired mobility and ADLs/At risk for falling  -Fall Precautions  -PT/OT/ST to evaluate and treat  -Tylenol 650 mg q6h prn for  fever/pain, if given for fever, notify MD/NP  -[facility] team to establish care plan meeting with patient and POA/family as appropriate  -Anticipate DC on or before TBD; SW to assist patient/family w/ DC planning. Lives at [facility].   -DC Plan:  TBD     Urinary Tract Infection   -Urine culture 10-50,000 colonies/ml E coli, 10-50,000 colonies/ml pseudomonas aeruginosa   -Macrobid 100 mg bid- 12/1-12/8/24  -In house ID to follow      Gastroenteritis/Norovirus  -Nausea/diarrhea and vomiting prior to admission. Given IVF. Given supportive care   -11/20 nurse reports 2 regular bowel movements today   -11/25 no longer on isolation.   -Nausea/vomiting/diarrhea symptoms all have resolved  -12/5 no abdominal symptoms      Hyperglycemia   -12/2 glucose 135-->114  -Check daily accu check fasting      Hypokalemia-resolved  -Potassium level on 12/4 3.9  -Monitor labs     A fib/HTN/HLD  -In hospital 11/18 Echo EF 40-45%. Elevated Trop in hospital likely related to rapid ventricular response A fib- given age, known diagnosis of dilated nonischemic cardiomyopathy no plans for invasive ischemic investigation  -Vital signs q shift and prn   -Labs weekly and prn  -Losartan 50 mg bid   -Metoprolol succinate 25 mg daily, hold for sbp <100 or hr<60     Pulm fibrosis  -Monitor symptoms   -Pulm/RT to follow as needed   -O2 as needed to keep saturation above 94%. Wears 2-3 L NC.      DVT Prophylaxis  -Encourage early mobilization and participation in PT/OT as able     Bowel Management Regimen  -Monitor BM status   -11/25 diarrhea now resolved from Norovirus. Continue to monitor   -Miralax 17 gm daily prn   -Senna S 1 tab bid      Supplements   -[facility] RD to follow while in rehab; supplementation/diet as per [facility] RD  -May continue home supplements  -Calcium Vit d supplement   -Mag 400 mg daily      LABS  -CBC/CMP weekly while in [facility]-->due on 12/9     Follow Up Appointments  -Dr. Chucky Almaguer, PCP within 1-2 weeks  following [facility] discharge.   *Follow-Up with specialists as recommended.    TRUPTI Yap  12/5/2024  2:30 PM

## 2024-12-09 ENCOUNTER — SNF VISIT (OUTPATIENT)
Dept: INTERNAL MEDICINE CLINIC | Age: 89
End: 2024-12-09

## 2024-12-09 VITALS
RESPIRATION RATE: 18 BRPM | TEMPERATURE: 97 F | HEART RATE: 69 BPM | SYSTOLIC BLOOD PRESSURE: 119 MMHG | DIASTOLIC BLOOD PRESSURE: 60 MMHG | OXYGEN SATURATION: 97 %

## 2024-12-09 DIAGNOSIS — I48.0 PAROXYSMAL ATRIAL FIBRILLATION (HCC): ICD-10-CM

## 2024-12-09 DIAGNOSIS — N39.0 URINARY TRACT INFECTION WITHOUT HEMATURIA, SITE UNSPECIFIED: Primary | ICD-10-CM

## 2024-12-09 DIAGNOSIS — A08.11 NOROVIRUS: ICD-10-CM

## 2024-12-09 DIAGNOSIS — R53.1 WEAKNESS: ICD-10-CM

## 2024-12-10 NOTE — PROGRESS NOTES
Viviana Cortes, 1930, 89 year old, female    Chief Complaint   Patient presents with    Follow - Up     Aftercare for UTI        Subjective:  Viviana Cortes : 1930, Age 89 year old female patient is admitted for subacute rehab. Patient has a past medical history of hypertension, a fib, pulm fibrosis/copd. She came to hospital with nausea/vomiting/diarrhea- she was found to have Norovirus. She was treated with conservative treatment and IVF. Admitting to [facility] for nursing care, medication management, and PT/OT/ST eval and tx.     Today:  Seen in room with no complaints at present.  Spoke with daughters, Latanya and Jenna with update on the patient's therapy.  Jenna  reported that they are anxious for the patient to go back to [facility].  She also reported that she spoke to  who indicated that [facility] may need to evaluation the patient again.  This writer will contact  to discuss further plan of care for discharge.  In addition, will have SW call daughter, Jenna Jason back as well.      Therapy update: 24  Bed mobility--Min/Supervision  Transfers--Min/CGA  Ambulation--100 ft CGA Min a  UE ADLs--CGA  LE ADLs--CGA   Toileting--CGA  Bathing--CGA  POC:  [facility]    Denies insomnia, fatigue, fever/chills, cough, SOB, dyspnea, angina, palpitations, n/v, diarrhea, constipation, and urinary sxs.    Objective:  /60   Pulse 69   Temp 97 °F (36.1 °C)   Resp 18   SpO2 97%   PHYSICAL EXAM:  GENERAL HEALTH: 89 year old female patient no acute distress   LINES, TUBES, DRAINS:  none  SKIN: no rashes, no suspicious lesions  WOUND: see wound assessment   EYES: PERRLA, conjunctiva normal; no drainage from eyes  HENT: normocephalic; normal nose, no nasal drainage, mucous membranes pink, moist  RESPIRATORY:Diminished, No wheezing/cough/accessory muscle use;1.5 L NC with Sats of 97%  CARDIOVASCULAR: S1, S2 normal, RRR; no S3, no S4;   ABDOMEN: normal active BS+,  soft, non-distended; no apparent masses; observed, non-tender, no guarding during physical exam  : Deferred  MUSCULOSKELETAL: no acute synovitis upper or lower extremity.  Weakness R/T recent hospitalization/diagnoses/sequelae; will undergo therapies to rehab and improve strength, endurance and independence w/ ADLs as able  EXTREMITIES/VASCULAR: no cyanosis, clubbing or edema  NEUROLOGIC: intact; no sensorimotor deficit  PSYCHIATRIC: alert, forgetful    Medications reviewed: Yes    Diagnostics reviewed:  Due on 12/10/24    Assessment and plan:  Physical Deconditioning/Impaired mobility and ADLs/At risk for falling  -Fall Precautions  -PT/OT/ST to evaluate and treat  -Tylenol 650 mg q6h prn for fever/pain, if given for fever, notify MD/NP  -[facility] team to establish care plan meeting with patient and POA/family as appropriate  -Anticipate DC on or before TBD; SW to assist patient/family w/ DC planning. Lives at [facility].   -DC Plan:  TBD     Urinary Tract Infection   -Urine culture 10-50,000 colonies/ml E coli, 10-50,000 colonies/ml pseudomonas aeruginosa   -Macrobid-completed  -In house ID following     Gastroenteritis/Norovirus  -Nausea/diarrhea and vomiting prior to admission. Given IVF. Given supportive care   -11/20 nurse reports 2 regular bowel movements today   -11/25 no longer on isolation.   -Nausea/vomiting/diarrhea symptoms all have resolved  -12/5 no abdominal symptoms      Hyperglycemia   -12/2 glucose 135-->114-->115  -Check daily accu check fasting      Hypokalemia-resolved  -Potassium level on 12/4 3.9  -Monitor labs     A fib/HTN/HLD  -In hospital 11/18 Echo EF 40-45%. Elevated Trop in hospital likely related to rapid ventricular response A fib- given age, known diagnosis of dilated nonischemic cardiomyopathy no plans for invasive ischemic investigation  -Vital signs q shift and prn   -Labs weekly and prn  -Losartan 50 mg bid   -Metoprolol succinate 25 mg daily, hold  for sbp <100 or hr<60     Pulm fibrosis  -Monitor symptoms   -Pulm/RT to follow as needed   -O2 as needed to keep saturation above 97%. Wears 1.5 L NC.      DVT Prophylaxis  -Encourage early mobilization and participation in PT/OT as able     Bowel Management Regimen  -Monitor BM status   -11/25 diarrhea now resolved from Norovirus. Continue to monitor   -Miralax 17 gm daily prn   -Senna S 1 tab bid      Supplements   -[facility] RD to follow while in rehab; supplementation/diet as per [facility] RD  -May continue home supplements  -Calcium Vit d supplement   -Mag 400 mg daily      LABS  -CBC/CMP weekly while in [facility]-->due 12/10/24     Follow Up Appointments  -Dr. Chucky Almaguer, PCP within 1-2 weeks following [facility] discharge.     Jose Manuel Layne, APRN  12/9/2024  5 p.m.

## 2024-12-12 ENCOUNTER — SNF DISCHARGE (OUTPATIENT)
Dept: INTERNAL MEDICINE CLINIC | Age: 89
End: 2024-12-12

## 2024-12-12 VITALS
HEART RATE: 73 BPM | BODY MASS INDEX: 23 KG/M2 | OXYGEN SATURATION: 94 % | WEIGHT: 138.31 LBS | TEMPERATURE: 98 F | DIASTOLIC BLOOD PRESSURE: 51 MMHG | RESPIRATION RATE: 18 BRPM | SYSTOLIC BLOOD PRESSURE: 108 MMHG

## 2024-12-12 DIAGNOSIS — I48.0 PAROXYSMAL ATRIAL FIBRILLATION (HCC): ICD-10-CM

## 2024-12-12 DIAGNOSIS — R53.1 WEAKNESS: ICD-10-CM

## 2024-12-12 DIAGNOSIS — N39.0 URINARY TRACT INFECTION WITHOUT HEMATURIA, SITE UNSPECIFIED: Primary | ICD-10-CM

## 2024-12-12 DIAGNOSIS — A08.11 NOROVIRUS: ICD-10-CM

## 2024-12-12 PROCEDURE — 99316 NF DSCHRG MGMT 30 MIN+: CPT | Performed by: NURSE PRACTITIONER

## 2024-12-12 NOTE — PROGRESS NOTES
Viviana Cortes, 1930, 89 year old, female is being discharged from Facility: [facility]      DISCHARGE SUMMARY    Date of Admission: 24    Date of Discharge:24                                Admitting Diagnoses:UTI, Norovirus    Subjective: Viviana Cortes  : 1930, Age 89 year old  female patient is admitted for subacute rehab. Patient has a past medical history of hypertension, a fib, pulm fibrosis/copd. She came to hospital with nausea/vomiting/diarrhea- she was found to have Norovirus. She was treated with conservative treatment and IVF.   Admitting to [facility] for nursing care, medication management, and PT/OT/ST eval and tx.     Vital signs:  /51   Pulse 73   Temp 97.7 °F (36.5 °C)   Resp 18   Wt 138 lb 4.8 oz (62.7 kg)   SpO2 94%   BMI 23.01 kg/m²     ROS and Physical Exam:    REVIEW OF SYSTEMS:  Denies insomnia, fatigue, fever/chills, cough, SOB, dyspnea, angina, palpitations, n/v, diarrhea, constipation, and urinary sxs.     PHYSICAL EXAM:  GENERAL HEALTH: 89 year old female patient no acute distress   LINES, TUBES, DRAINS:  none  SKIN: no rashes, no suspicious lesions  WOUND: see wound assessment   EYES: PERRLA, conjunctiva normal; no drainage from eyes  HENT: normocephalic; normal nose, no nasal drainage, mucous membranes pink, moist  RESPIRATORY:Diminished, No wheezing/cough/accessory muscle use;1.5 L NC with Sats of 94%  CARDIOVASCULAR: S1, S2 normal, RRR; no S3, no S4;   ABDOMEN: normal active BS+, soft, non-distended; no apparent masses; observed, non-tender, no guarding during physical exam  : Deferred  MUSCULOSKELETAL: no acute synovitis upper or lower extremity.  Weakness R/T recent hospitalization/diagnoses/sequelae; will undergo therapies to rehab and improve strength, endurance and independence w/ ADLs as able  EXTREMITIES/VASCULAR: no cyanosis, clubbing or edema  NEUROLOGIC: intact; no sensorimotor deficit  PSYCHIATRIC: alert,   confused    Therapy update:12/9/24  Bed mobility--Min/Sup  Transfers--Min/CGA  Ambulation--100 ft CGA/Min a  UE ADLs--CGA  LE ADLs--CGA  Toileting--CGA  Bathing--CGA    Skilled Nursing Facility Course:    Progressing well with PT/OT.  Medically cleared for anticipated discharge  Home with home care services RN/PT/OT/ST/Bath Aide  Equipment per PT recommendations: Walker/W/C    Most recent lab results: dated:  12/12/24    CBC W/DIFF AND PLATELETS  WHITE BLOOD CELLS 9.28 THO/mm3 4.80 - 10.80 Final  RED BLOOD CELLS 4.48 MIL/mm3 4.20 - 5.40 Final  HEMOGLOBIN 12.3 g/dL 12.0 - 16.0 Final  HEMATOCRIT 38.5 % 37.0 - 47.0 Final  MCV 85.9 fL 81.0 - 99.0 Final  MCH 27.5 pg 27.0 - 33.0 Final  MCHC 31.9 g/dL 32.0 - 36.0 L Final  RDW-CV 15.1 % 11.5 - 15.2 Final  PLATELET COUNT 216 THO/mm3 150 - 400 Final  MPV 10.7 fL 9.5 - 13.1 Final  NEUTROPHILS, ABS 6.18 THO/mm3 1.40 - 6.80 Final  LYMPHOCYTES, ABS 2.00 THO/mm3 0.80 - 3.00 Final  MONOCYTES, ABS 0.55 THO/mm3 0.20 - 1.00 Final  EOSINOPHILS, ABS 0.38 THO/mm3 0.00 - 0.50 Final  BASOPHILS, ABS 0.13 THO/mm3 0.00 - 0.10 H Final  IMMATURE GRANS, ABS 0.04 THO/mm3 0.00 - 0.10 Final  NEUTROPHILS % 66.6 % Final  LYMPHOCYTES % 21.6 % Final  MONOCYTES % 5.9 % Final  EOSINOPHILS % 4.1 % Final  BASOPHILS % 1.4 % Final  IMMATURE GRANS % 0.4 % Final    COMPREHENSIVE METABOLIC  GLUCOSE 80 mg/dL 70 - 110 Final  BUN 11 mg/dL 7 - 28 Final  CREATININE 0.73 mg/dL 0.44 - 1.32 Final  BILIRUBIN, TOTAL 0.49 mg/dL 0.16 - 1.30 Final  PROTEIN, TOTAL 5.8 g/dL 5.6 - 8.2 Final  PROTEIN, TOTAL 5.8 g/dL 5.6 - 8.2 Final  ALBUMIN 3.2 g/dL 3.2 - 4.9 Final  SODIUM 142 mEq/L 138 - 147 Final  POTASSIUM 3.9 mEq/L 3.6 - 5.0 Final  CHLORIDE 104 mEq/L 99 - 110 Final  CARBON DIOXIDE 28 mmol/L 18 - 30 Final  SGPT(ALT) 8 U/L 0 - 42 Final  SGOT(AST) 13 U/L 9 - 35 Final  ALK. PHOSPHATASE 77 U/L 34 - 143 Final  CALCIUM 8.6 mg/dL 8.7 - 10.5 L Final  eGFR If African American > 60 mL/m/1.73m  2  >=60 Final  eGFR If Non-African  American > 60 mL/m/1.73m  2  >=60 Final    Discharge Diagnoses w/ current management:  Physical Deconditioning/Impaired mobility and ADLs/At risk for falling  -Tylenol 650 mg q6h prn for fever/pain, if given for fever, notify MD/NP  -[facility] team to establish care plan meeting with patient and POA/family as appropriate  -Anticipate DC on or before 12/14/24; SW to assist patient/family w/ DC planning. Lives at [facility].   -DC Plan:  [facility]     Urinary Tract Infection   -Urine culture 10-50,000 colonies/ml E coli, 10-50,000 colonies/ml pseudomonas aeruginosa   -Macrobid-completed     Gastroenteritis/Norovirus  -Nausea/diarrhea and vomiting prior to admission. Given IVF. Given supportive care   -11/20 nurse reports 2 regular bowel movements today   -11/25 no longer on isolation.   -Nausea/vomiting/diarrhea symptoms all have resolved  -12/5 no abdominal symptoms      Hyperglycemia   -12/2 glucose 135-->114-->115  -Check daily accu check fasting      Hypokalemia-resolved  -Potassium level:  3.9     A fib/HTN/HLD  -In hospital 11/18 Echo EF 40-45%. Elevated Trop in hospital likely related to rapid ventricular response A fib- given age, known diagnosis of dilated nonischemic cardiomyopathy no plans for invasive ischemic investigation  -Losartan 50 mg bid   -Metoprolol succinate 25 mg daily, hold for sbp <100 or hr<60     Pulm fibrosis  -Monitor symptoms   -Pulm/RT to follow as needed   -O2 as needed to keep saturation above 94%. Wears 1.5 L NC.      DVT Prophylaxis  -Encourage early mobilization and participation in PT/OT as able     Bowel Management Regimen  -Monitor BM status   -11/25 diarrhea now resolved from Norovirus. Continue to monitor   -Miralax 17 gm daily prn   -Senna S 1 tab bid      Supplements   -[facility] RD to follow while in rehab; supplementation/diet as per [facility] RD  -May continue home supplements  -Calcium Vit d supplement   -Mag 400 mg daily      LABS  -Per PCP     Follow Up  Appointments  -Dr. Chucky Almaguer, PCP within 1-2 weeks following [facility] discharge.      Medication Reconciliation Completed:  Yes    *Greater than 30 minutes spent in coordination of care in preparation for discharge.    Jose Manuel Layne, TRUPTI  12/12/2024  1:48 PM

## 2025-02-21 ENCOUNTER — TELEPHONE (OUTPATIENT)
Dept: CARDIOLOGY | Age: OVER 89
End: 2025-02-21

## 2025-02-24 ENCOUNTER — APPOINTMENT (OUTPATIENT)
Dept: CARDIOLOGY | Age: OVER 89
End: 2025-02-24

## 2025-02-24 VITALS
BODY MASS INDEX: 24.1 KG/M2 | WEIGHT: 136.02 LBS | SYSTOLIC BLOOD PRESSURE: 127 MMHG | HEIGHT: 63 IN | HEART RATE: 69 BPM | DIASTOLIC BLOOD PRESSURE: 60 MMHG

## 2025-02-24 DIAGNOSIS — E78.5 DYSLIPIDEMIA: ICD-10-CM

## 2025-02-24 DIAGNOSIS — I10 PRIMARY HYPERTENSION: ICD-10-CM

## 2025-02-24 DIAGNOSIS — I73.9 PERIPHERAL VASCULAR DISEASE (CMD): ICD-10-CM

## 2025-02-24 DIAGNOSIS — J44.9 CHRONIC OBSTRUCTIVE PULMONARY DISEASE, UNSPECIFIED COPD TYPE  (CMD): ICD-10-CM

## 2025-02-24 DIAGNOSIS — I42.0 DILATED CARDIOMYOPATHY  (CMD): Primary | ICD-10-CM

## 2025-02-24 PROCEDURE — 99214 OFFICE O/P EST MOD 30 MIN: CPT | Performed by: SPECIALIST

## 2025-02-24 RX ORDER — POTASSIUM CHLORIDE 750 MG/1
10 TABLET, EXTENDED RELEASE ORAL DAILY
Qty: 30 TABLET | Refills: 3 | Status: SHIPPED | OUTPATIENT
Start: 2025-02-24

## 2025-02-24 RX ORDER — FUROSEMIDE 20 MG/1
20 TABLET ORAL DAILY
Qty: 30 TABLET | Refills: 3 | Status: SHIPPED | OUTPATIENT
Start: 2025-02-24

## 2025-02-24 SDOH — HEALTH STABILITY: PHYSICAL HEALTH: ON AVERAGE, HOW MANY MINUTES DO YOU ENGAGE IN EXERCISE AT THIS LEVEL?: 0 MIN

## 2025-02-24 SDOH — HEALTH STABILITY: PHYSICAL HEALTH: ON AVERAGE, HOW MANY DAYS PER WEEK DO YOU ENGAGE IN MODERATE TO STRENUOUS EXERCISE (LIKE A BRISK WALK)?: 0 DAYS

## 2025-02-24 ASSESSMENT — PATIENT HEALTH QUESTIONNAIRE - PHQ9
2. FEELING DOWN, DEPRESSED OR HOPELESS: NOT AT ALL
SUM OF ALL RESPONSES TO PHQ9 QUESTIONS 1 AND 2: 0
1. LITTLE INTEREST OR PLEASURE IN DOING THINGS: NOT AT ALL
CLINICAL INTERPRETATION OF PHQ2 SCORE: NO FURTHER SCREENING NEEDED
SUM OF ALL RESPONSES TO PHQ9 QUESTIONS 1 AND 2: 0

## 2025-03-31 ENCOUNTER — APPOINTMENT (OUTPATIENT)
Dept: CARDIOLOGY | Age: OVER 89
End: 2025-03-31

## 2025-04-09 RX ORDER — METOPROLOL SUCCINATE 25 MG/1
25 TABLET, EXTENDED RELEASE ORAL DAILY
Qty: 90 TABLET | Refills: 2 | Status: SHIPPED | OUTPATIENT
Start: 2025-04-09

## 2025-04-24 ENCOUNTER — HOSPITAL ENCOUNTER (EMERGENCY)
Facility: HOSPITAL | Age: OVER 89
Discharge: HOME | End: 2025-04-24
Attending: EMERGENCY MEDICINE
Payer: MEDICARE

## 2025-04-24 ENCOUNTER — APPOINTMENT (OUTPATIENT)
Dept: CT IMAGING | Facility: HOSPITAL | Age: OVER 89
End: 2025-04-24
Attending: EMERGENCY MEDICINE
Payer: MEDICARE

## 2025-04-24 VITALS
TEMPERATURE: 98 F | WEIGHT: 138.25 LBS | DIASTOLIC BLOOD PRESSURE: 84 MMHG | RESPIRATION RATE: 18 BRPM | BODY MASS INDEX: 22.22 KG/M2 | SYSTOLIC BLOOD PRESSURE: 180 MMHG | OXYGEN SATURATION: 100 % | HEIGHT: 66 IN | HEART RATE: 68 BPM

## 2025-04-24 DIAGNOSIS — G89.29 CHRONIC BILATERAL BACK PAIN, UNSPECIFIED BACK LOCATION: ICD-10-CM

## 2025-04-24 DIAGNOSIS — M54.9 CHRONIC BILATERAL BACK PAIN, UNSPECIFIED BACK LOCATION: ICD-10-CM

## 2025-04-24 DIAGNOSIS — R10.9 FLANK PAIN, ACUTE: Primary | ICD-10-CM

## 2025-04-24 DIAGNOSIS — N39.0 URINARY TRACT INFECTION WITHOUT HEMATURIA, SITE UNSPECIFIED: ICD-10-CM

## 2025-04-24 LAB
ALBUMIN SERPL-MCNC: 4.4 G/DL (ref 3.2–4.8)
ALBUMIN/GLOB SERPL: 1.3 {RATIO} (ref 1–2)
ALP LIVER SERPL-CCNC: 96 U/L (ref 55–142)
ALT SERPL-CCNC: 8 U/L (ref 10–49)
ANION GAP SERPL CALC-SCNC: 10 MMOL/L (ref 0–18)
AST SERPL-CCNC: 19 U/L (ref ?–34)
BASOPHILS # BLD AUTO: 0.13 X10(3) UL (ref 0–0.2)
BASOPHILS NFR BLD AUTO: 1.3 %
BILIRUB SERPL-MCNC: 0.5 MG/DL (ref 0.2–0.9)
BILIRUB UR QL STRIP.AUTO: NEGATIVE
BUN BLD-MCNC: 9 MG/DL (ref 9–23)
CALCIUM BLD-MCNC: 10 MG/DL (ref 8.7–10.6)
CHLORIDE SERPL-SCNC: 105 MMOL/L (ref 98–112)
CO2 SERPL-SCNC: 29 MMOL/L (ref 21–32)
COLOR UR AUTO: YELLOW
CREAT BLD-MCNC: 0.97 MG/DL (ref 0.55–1.02)
EGFRCR SERPLBLD CKD-EPI 2021: 54 ML/MIN/1.73M2 (ref 60–?)
EOSINOPHIL # BLD AUTO: 0.25 X10(3) UL (ref 0–0.7)
EOSINOPHIL NFR BLD AUTO: 2.5 %
ERYTHROCYTE [DISTWIDTH] IN BLOOD BY AUTOMATED COUNT: 13.2 %
GLOBULIN PLAS-MCNC: 3.4 G/DL (ref 2–3.5)
GLUCOSE BLD-MCNC: 101 MG/DL (ref 70–99)
GLUCOSE UR STRIP.AUTO-MCNC: NORMAL MG/DL
HCT VFR BLD AUTO: 44.6 % (ref 35–48)
HGB BLD-MCNC: 14.3 G/DL (ref 12–16)
IMM GRANULOCYTES # BLD AUTO: 0.02 X10(3) UL (ref 0–1)
IMM GRANULOCYTES NFR BLD: 0.2 %
KETONES UR STRIP.AUTO-MCNC: NEGATIVE MG/DL
LEUKOCYTE ESTERASE UR QL STRIP.AUTO: 250
LIPASE SERPL-CCNC: 28 U/L (ref 12–53)
LYMPHOCYTES # BLD AUTO: 2.19 X10(3) UL (ref 1–4)
LYMPHOCYTES NFR BLD AUTO: 21.6 %
MCH RBC QN AUTO: 27.6 PG (ref 26–34)
MCHC RBC AUTO-ENTMCNC: 32.1 G/DL (ref 31–37)
MCV RBC AUTO: 86.1 FL (ref 80–100)
MONOCYTES # BLD AUTO: 0.59 X10(3) UL (ref 0.1–1)
MONOCYTES NFR BLD AUTO: 5.8 %
NEUTROPHILS # BLD AUTO: 6.98 X10 (3) UL (ref 1.5–7.7)
NEUTROPHILS # BLD AUTO: 6.98 X10(3) UL (ref 1.5–7.7)
NEUTROPHILS NFR BLD AUTO: 68.6 %
OSMOLALITY SERPL CALC.SUM OF ELEC: 297 MOSM/KG (ref 275–295)
PH UR STRIP.AUTO: 7 [PH] (ref 5–8)
PLATELET # BLD AUTO: 282 10(3)UL (ref 150–450)
POTASSIUM SERPL-SCNC: 4.5 MMOL/L (ref 3.5–5.1)
PROT SERPL-MCNC: 7.8 G/DL (ref 5.7–8.2)
PROT UR STRIP.AUTO-MCNC: NEGATIVE MG/DL
RBC # BLD AUTO: 5.18 X10(6)UL (ref 3.8–5.3)
RBC UR QL AUTO: NEGATIVE
SODIUM SERPL-SCNC: 144 MMOL/L (ref 136–145)
SP GR UR STRIP.AUTO: 1.01 (ref 1–1.03)
UROBILINOGEN UR STRIP.AUTO-MCNC: NORMAL MG/DL
WBC # BLD AUTO: 10.2 X10(3) UL (ref 4–11)

## 2025-04-24 PROCEDURE — 81001 URINALYSIS AUTO W/SCOPE: CPT | Performed by: EMERGENCY MEDICINE

## 2025-04-24 PROCEDURE — 87077 CULTURE AEROBIC IDENTIFY: CPT | Performed by: EMERGENCY MEDICINE

## 2025-04-24 PROCEDURE — 99284 EMERGENCY DEPT VISIT MOD MDM: CPT

## 2025-04-24 PROCEDURE — 85025 COMPLETE CBC W/AUTO DIFF WBC: CPT | Performed by: EMERGENCY MEDICINE

## 2025-04-24 PROCEDURE — 96374 THER/PROPH/DIAG INJ IV PUSH: CPT

## 2025-04-24 PROCEDURE — 87186 SC STD MICRODIL/AGAR DIL: CPT | Performed by: EMERGENCY MEDICINE

## 2025-04-24 PROCEDURE — 83690 ASSAY OF LIPASE: CPT | Performed by: EMERGENCY MEDICINE

## 2025-04-24 PROCEDURE — 74176 CT ABD & PELVIS W/O CONTRAST: CPT | Performed by: EMERGENCY MEDICINE

## 2025-04-24 PROCEDURE — 87086 URINE CULTURE/COLONY COUNT: CPT | Performed by: EMERGENCY MEDICINE

## 2025-04-24 PROCEDURE — 80053 COMPREHEN METABOLIC PANEL: CPT | Performed by: EMERGENCY MEDICINE

## 2025-04-24 RX ORDER — LIDOCAINE 50 MG/G
2 PATCH TOPICAL EVERY 24 HOURS
Qty: 30 PATCH | Refills: 0 | Status: SHIPPED | OUTPATIENT
Start: 2025-04-24 | End: 2025-05-09

## 2025-04-24 RX ORDER — MELOXICAM 7.5 MG/1
7.5 TABLET ORAL DAILY
Qty: 10 TABLET | Refills: 0 | Status: SHIPPED | OUTPATIENT
Start: 2025-04-24 | End: 2025-05-04

## 2025-04-24 RX ORDER — KETOROLAC TROMETHAMINE 15 MG/ML
15 INJECTION, SOLUTION INTRAMUSCULAR; INTRAVENOUS ONCE
Status: COMPLETED | OUTPATIENT
Start: 2025-04-24 | End: 2025-04-24

## 2025-04-24 RX ORDER — CEPHALEXIN 500 MG/1
500 CAPSULE ORAL 3 TIMES DAILY
Qty: 30 CAPSULE | Refills: 0 | Status: SHIPPED | OUTPATIENT
Start: 2025-04-24 | End: 2025-05-04

## 2025-04-24 RX ORDER — CEPHALEXIN 500 MG/1
500 CAPSULE ORAL ONCE
Status: COMPLETED | OUTPATIENT
Start: 2025-04-24 | End: 2025-04-24

## 2025-04-24 NOTE — ED PROVIDER NOTES
Patient Seen in: [facility] Emergency Department      History     Chief Complaint   Patient presents with    Back Pain     Stated Complaint: back pain, NKI    Subjective:   HPI    89-year-old female presents to the emergency department complaints of right flank pain.  She states it started this morning after she got up.  She states it was not too bad initially but then she had gone back to bed and got back up and had become more severe.  She states currently she is more comfortable.  She denies any fevers or chills.  Patient's daughter is assisting with history and states that she does get issues with recurrent urinary tract infections.  On review of her records she has some chronic back issues and has had compression fractures.  Patient denies any falls.  She has not noted any hematuria.  She did take some Tylenol this morning.  History of Present Illness               Objective:     Past Medical History:    A-fib (HCC)    Anesthesia complication    likes to sleep shallow breathes    Back problem    scoliosis    C. difficile diarrhea    2014, cleared per family    Calculus of kidney    Cancer (HCC)    uterine cancer    Cataract    Chronic low back pain    Congestive heart disease (HCC)    COPD (chronic obstructive pulmonary disease) ([facility])    O2 at HS    Diverticulitis    Diverticulosis of large intestine    Esophageal reflux    Exposure to medical diagnostic radiation    1975    Hearing impairment    bilateral hearing aids    Heart attack (HCC)    High blood pressure    High cholesterol    History of stomach ulcers    Lumbosacral spondylosis without myelopathy    Osteoporosis    Other and unspecified hyperlipidemia    Pancreatitis (HCC)    Personal history of antineoplastic chemotherapy    last tx 1975    Pneumonia due to organism    Shortness of breath    2 L/NC at HS    Unspecified essential hypertension    Visual impairment              Past Surgical History:   Procedure Laterality Date    Appendectomy       Cataract      Cholecystectomy  2019    Dr. Gaston Ortiz    Colonoscopy  2004    Diverticulosis, no polyps    Colonoscopy      Ercp - internal  2019    stent removal; no stones or leak    Ercp,remval stones  2019    CBD stones removed    Eye surgery      Fracture surgery      upper right arm, right leg    Hysterectomy  1970    Oophorectomy, for endometrial CA    Other  HIP INJURY    orif Right hip     Upper gi endoscopy,exam                  Social History     Socioeconomic History    Marital status:    Tobacco Use    Smoking status: Former     Current packs/day: 0.00     Average packs/day: 0.1 packs/day for 60.0 years (3.6 ttl pk-yrs)     Types: Cigarettes     Start date: 1952     Quit date: 2004     Years since quittin.3    Smokeless tobacco: Never   Vaping Use    Vaping status: Never Used   Substance and Sexual Activity    Alcohol use: Yes     Alcohol/week: 1.0 standard drink of alcohol     Types: 1 Glasses of wine per week     Comment: rare    Drug use: No   Other Topics Concern    Caffeine Concern No    Exercise Yes     Social Drivers of Health     Food Insecurity: Low Risk  (11/15/2024)    Received from [facility] Food Security     Within the past 12 months, the food you bought just didn't last and you didn't have money to get more.: 3     Within the past 12 months, you worried that your food would run out before you got money to buy more.: 3   Transportation Needs: Not At Risk (11/15/2024)    Received from [facility] Transportation Needs     In the past 12 months, has lack of reliable transportation kept you from medical appointments, meetings, work or from getting things needed for daily living?: No   Housing Stability: Not At Risk (11/15/2024)    Received from [facility] Housing     What is your living situation today?: I have a steady place to live     Think about the place you live. Do you have problems with any of the following?: None of the  above                                Physical Exam     ED Triage Vitals [04/24/25 1205]   /62   Pulse 58   Resp 18   Temp 98 °F (36.7 °C)   Temp src Temporal   SpO2 95 %   O2 Device Nasal cannula       Current Vitals:   Vital Signs  BP: (!) 180/84  Pulse: 68  Resp: 18  Temp: 98 °F (36.7 °C)  Temp src: Temporal  MAP (mmHg): (!) 104    Oxygen Therapy  SpO2: 100 %  O2 Device: Nasal cannula  O2 Flow Rate (L/min): 2 L/min        Physical Exam  Vitals and nursing note reviewed. Chaperone present: Daughter in room assisting with history.   Constitutional:       Appearance: Normal appearance. She is well-developed and normal weight.   HENT:      Head: Normocephalic and atraumatic.   Cardiovascular:      Rate and Rhythm: Normal rate and regular rhythm.      Pulses: Normal pulses.      Heart sounds: Normal heart sounds.   Pulmonary:      Effort: Pulmonary effort is normal.      Breath sounds: Normal breath sounds. No stridor. No wheezing.   Abdominal:      General: Bowel sounds are normal.      Palpations: Abdomen is soft.      Tenderness: There is no abdominal tenderness. There is right CVA tenderness. There is no rebound.      Comments: Patient does have tenderness in her right flank but it does go down her psoas muscle as well   Musculoskeletal:         General: No tenderness. Normal range of motion.      Cervical back: Normal range of motion and neck supple.   Lymphadenopathy:      Cervical: No cervical adenopathy.   Skin:     General: Skin is warm and dry.      Coloration: Skin is not pale.   Neurological:      General: No focal deficit present.      Mental Status: She is alert and oriented to person, place, and time. Mental status is at baseline.      Cranial Nerves: No cranial nerve deficit.      Coordination: Coordination normal.      Comments: Hard of hearing but answers questions appropriately          Physical Exam                ED Course     Labs Reviewed   COMP METABOLIC PANEL (14) - Abnormal; Notable for  the following components:       Result Value    Glucose 101 (*)     Calculated Osmolality 297 (*)     eGFR-Cr 54 (*)     ALT 8 (*)     All other components within normal limits   URINALYSIS WITH CULTURE REFLEX - Abnormal; Notable for the following components:    Clarity Urine Turbid (*)     Nitrite Urine 2+ (*)     Leukocyte Esterase Urine 250 (*)     WBC Urine 6-10 (*)     Bacteria Urine Rare (*)     Squamous Epi. Cells Few (*)     All other components within normal limits   LIPASE - Normal   CBC WITH DIFFERENTIAL WITH PLATELET   URINE CULTURE, ROUTINE          Results            CT ABDOMEN+PELVIS KIDNEYSTONE 2D RNDR(NO IV,NO ORAL)(CPT=74176)  Result Date: 4/24/2025  PROCEDURE:  CT ABDOMEN+PELVIS KIDNEYSTONE 2D RNDR(NO IV,NO ORAL)(CPT=74176)  COMPARISON:  [facility] , CT, CTA CHEST + CT ABD (W) + CT PEL (W) SH(CPT=71275/53749), 8/14/2019, 3:36 AM.  INDICATIONS:  back pain, NKI  TECHNIQUE:  Unenhanced multislice CT scanning from above the kidneys to below the urinary bladder.  2D rendering are generated on the CT scanner workstation to localize potential stones in the cranio-caudal plane.  Dose reduction techniques were used. Dose information is transmitted to the ACR (American College of Radiology) NRDR (National Radiology Data Registry) which includes the Dose Index Registry.  PATIENT STATED HISTORY: (As transcribed by Technologist)  Patient with right flank pain.    FINDINGS:  LUNG BASE:  Atelectasis/scarring. LIVER:  Scattered calcified granulomata BILIARY:  Cholecystectomy.  No biliary ductal dilatation. PANCREAS:  Atrophy SPLEEN:  Normal caliber.  Calcified granulomata. KIDNEYS:  No hydronephrosis or obstructing urinary calculus.  Nonobstructing left nephrolithiasis.  Vascular calcifications left kidney.  ADRENALS:  Normal. AORTA/VASCULAR:  Tortuous calcified abdominal aorta measuring up to 2.3 cm in maximum diameter.  Aortoiliac calcification.  RETROPERITONEUM:  No enlarged adenopathy. BOWEL/MESENTERY:  Normal  caliber bowel loops. Uncomplicated colonic diverticulosis ABDOMINAL WALL:  Small fat containing umbilical hernia.  PELVIC ORGANS:  Wall thickening of the urinary bladder with adjacent soft tissue stranding suspicious for cystitis.  Hysterectomy.  BONES:  Thoracolumbar scoliosis.  Post vertebroplasty changes T11.  Compression deformity of T12 appears relatively stable.  Postsurgical changes involving the right hip.             CONCLUSION:  Both kidneys are negative for hydronephrosis.  No CT evidence for obstructing urinary calculus.  Are vascular calcifications involving the left kidney with probable left nephrolithiasis midpole.  Wall thickening of the urinary bladder concerning for cystitis.  Extensive colonic diverticulosis.   LOCATION:  [id number]   Dictated by (CST): Kimmie Nolasco MD on 4/24/2025 at 3:41 PM     Finalized by (CST): Kimmie Nolasco MD on 4/24/2025 at 3:47 PM                           MDM      Patient had IV established and blood work obtained she was given a dose of Toradol.  I reviewed her previous records and she has a history of kidney stones in the remote past.  She also has had extensive back issues and has had known compression fractures in the past requiring kyphoplasty.  We obtained a urinalysis she does not have any andrew blood she was nitrite positive and had a few white blood cells.  This could be contaminant but with the being nitrate positive we will treat for urinary tract infection.  Blood work was otherwise unremarkable she does not have an elevated white count or any other electrolyte abnormalities.  She underwent a CT scan of her abdomen pelvis per kidney stone protocol I reviewed the films do not appreciate obvious obstruction or free air reviewed radiology report they did not note any hydronephrosis they noted vascular calcification in the left kidney with a probable left stone in the midpole but nothing obstructing.  They did note changes that are consistent with cystitis.  She  incidentally has extensive colonic diverticulosis but no diverticulitis.  They did not appreciate any new compression fractures.  Patient was reevaluated was feeling better after the Toradol.  We discussed the results and we discussed her potentially having urinary tract infection in the musculoskeletal and ongoing arthritic changes of her back.  She will be given pain medicines and be initiated on Keflex.  Patient was discharged in good condition        Medical Decision Making      Disposition and Plan     Clinical Impression:  1. Flank pain, acute    2. Chronic bilateral back pain, unspecified back location    3. Urinary tract infection without hematuria, site unspecified         Disposition:  Discharge  4/24/2025  4:04 pm    Follow-up:  [facility], [address]  [facility] [address]  [facility] [address] [phone]  Call  choose option 2 for neurosurgery or pain follow up    Chucky Almaguer MD  [address]  [facility] [address]  [facility] [address] [phone]    Schedule an appointment as soon as possible for a visit            Medications Prescribed:  Discharge Medication List as of 4/24/2025  4:11 PM        START taking these medications    Details   cephALEXin 500 MG Oral Cap Take 1 capsule (500 mg total) by mouth 3 (three) times daily for 10 days., Normal, Disp-30 capsule, R-0      Meloxicam 7.5 MG Oral Tab Take 1 tablet (7.5 mg total) by mouth daily for 10 days., Normal, Disp-10 tablet, R-0      lidocaine 5 % External Patch Place 2 patches onto the skin daily for 15 days., Normal, Disp-30 patch, R-0             Supplementary Documentation:

## 2025-04-24 NOTE — DISCHARGE INSTRUCTIONS
You will be contacted by our pharmacy team should there be an issue with the antibiotics for your bladder infection.  Stay well-hydrated.

## 2025-04-24 NOTE — ED INITIAL ASSESSMENT (HPI)
Patient presents for evaluation of right flank pain. She states she ambulated to the bathroom and returned to her chair and when she sat down she noticed the pain. Denies known injury or urinary symptoms.

## 2025-08-18 ENCOUNTER — APPOINTMENT (OUTPATIENT)
Dept: CARDIOLOGY | Age: OVER 89
End: 2025-08-18

## 2025-08-25 ENCOUNTER — APPOINTMENT (OUTPATIENT)
Dept: CARDIOLOGY | Age: OVER 89
End: 2025-08-25

## 2025-08-29 ENCOUNTER — APPOINTMENT (OUTPATIENT)
Dept: CARDIOLOGY | Age: OVER 89
End: 2025-08-29

## 2025-08-29 VITALS
HEART RATE: 57 BPM | HEIGHT: 63 IN | SYSTOLIC BLOOD PRESSURE: 129 MMHG | OXYGEN SATURATION: 94 % | DIASTOLIC BLOOD PRESSURE: 70 MMHG | WEIGHT: 137.24 LBS | BODY MASS INDEX: 24.32 KG/M2

## 2025-08-29 DIAGNOSIS — E78.5 DYSLIPIDEMIA: Primary | ICD-10-CM

## 2025-08-29 DIAGNOSIS — J44.9 CHRONIC OBSTRUCTIVE PULMONARY DISEASE, UNSPECIFIED COPD TYPE  (CMD): ICD-10-CM

## 2025-08-29 DIAGNOSIS — I10 PRIMARY HYPERTENSION: ICD-10-CM

## 2025-08-29 DIAGNOSIS — I73.9 PERIPHERAL VASCULAR DISEASE (CMD): ICD-10-CM

## 2025-08-29 PROBLEM — R60.0 LEG EDEMA: Status: ACTIVE | Noted: 2025-08-29

## 2025-08-29 RX ORDER — LOSARTAN POTASSIUM 50 MG/1
50 TABLET ORAL 2 TIMES DAILY
Qty: 180 TABLET | Refills: 3 | Status: SHIPPED | OUTPATIENT
Start: 2025-08-29

## 2025-08-29 RX ORDER — ESCITALOPRAM OXALATE 5 MG/1
5 TABLET ORAL DAILY
COMMUNITY
Start: 2025-07-18

## 2025-08-29 RX ORDER — METOPROLOL SUCCINATE 25 MG/1
25 TABLET, EXTENDED RELEASE ORAL DAILY
Qty: 90 TABLET | Refills: 3 | Status: SHIPPED | OUTPATIENT
Start: 2025-08-29

## 2025-08-29 SDOH — HEALTH STABILITY: PHYSICAL HEALTH: ON AVERAGE, HOW MANY MINUTES DO YOU ENGAGE IN EXERCISE AT THIS LEVEL?: 20 MIN

## 2025-08-29 SDOH — HEALTH STABILITY: PHYSICAL HEALTH: ON AVERAGE, HOW MANY DAYS PER WEEK DO YOU ENGAGE IN MODERATE TO STRENUOUS EXERCISE (LIKE A BRISK WALK)?: 5 DAYS

## 2025-08-29 SDOH — HEALTH STABILITY: MENTAL HEALTH: HOW MANY STANDARD DRINKS CONTAINING ALCOHOL DO YOU HAVE ON A TYPICAL DAY?: 0,1 OR 2

## 2025-08-29 SDOH — HEALTH STABILITY: MENTAL HEALTH: HOW OFTEN DO YOU HAVE 6 OR MORE DRINKS ON ONE OCCASION?: NEVER

## 2025-08-29 SDOH — HEALTH STABILITY: MENTAL HEALTH: HOW OFTEN DO YOU HAVE A DRINK CONTAINING ALCOHOL?: NEVER

## 2025-08-29 SDOH — HEALTH STABILITY: MENTAL HEALTH: AUDIT TOTAL SCORE: 0

## 2025-08-29 ASSESSMENT — PATIENT HEALTH QUESTIONNAIRE - PHQ9
SUM OF ALL RESPONSES TO PHQ9 QUESTIONS 1 AND 2: 0
SUM OF ALL RESPONSES TO PHQ9 QUESTIONS 1 AND 2: 0
2. FEELING DOWN, DEPRESSED OR HOPELESS: NOT AT ALL
1. LITTLE INTEREST OR PLEASURE IN DOING THINGS: NOT AT ALL

## 2026-03-02 ENCOUNTER — APPOINTMENT (OUTPATIENT)
Dept: CARDIOLOGY | Age: OVER 89
End: 2026-03-02

## (undated) DEVICE — 1200CC GUARDIAN II: Brand: GUARDIAN

## (undated) DEVICE — GLOVE SURG SENSICARE SZ 7

## (undated) DEVICE — DRAIN RELIAVAC 100CC

## (undated) DEVICE — FIRSTSTEP 200ML READY2USE GEMI

## (undated) DEVICE — ENDOSCOPY PACK - LOWER: Brand: MEDLINE INDUSTRIES, INC.

## (undated) DEVICE — #11 STERILE BLADE: Brand: POLYMER COATED BLADES

## (undated) DEVICE — TRUETOME 39 3.9 FX 20CM

## (undated) DEVICE — TROCAR: Brand: KII FIOS FIRST ENTRY

## (undated) DEVICE — BULB SYRINGE,IRRIGATION WITH PROTECTIVE CAP: Brand: DOVER

## (undated) DEVICE — SUTURE VICRYL 4-0 SH

## (undated) DEVICE — STERILE POLYISOPRENE POWDER-FREE SURGICAL GLOVES: Brand: PROTEXIS

## (undated) DEVICE — DRAPE EQUIPMENT INTRATEMP THER

## (undated) DEVICE — MEDI-VAC NON-CONDUCTIVE SUCTION TUBING: Brand: CARDINAL HEALTH

## (undated) DEVICE — DERMABOND LIQUID ADHESIVE

## (undated) DEVICE — BLADE ELECTRODE: Brand: EDGE

## (undated) DEVICE — GAUZE SPONGES,USP TYPE VII GAUZE, 12 PLY: Brand: CURITY

## (undated) DEVICE — FILTERLINE NASAL ADULT O2/CO2

## (undated) DEVICE — HOWELL D.A.S.H. SPHINCTEROTOME: Brand: D.A.S.H.

## (undated) DEVICE — SUTURE VICRYL 0

## (undated) DEVICE — Device

## (undated) DEVICE — REM POLYHESIVE ADULT PATIENT RETURN ELECTRODE: Brand: VALLEYLAB

## (undated) DEVICE — CHLORAPREP 26ML APPLICATOR

## (undated) DEVICE — LAP CHOLE/APPY CDS-LF: Brand: MEDLINE INDUSTRIES, INC.

## (undated) DEVICE — SUTURE VICRYL 0 UR-6

## (undated) DEVICE — RETRIEVAL BALLOON CATHETER: Brand: EXTRACTOR™ PRO RX

## (undated) DEVICE — HEMOCLIP HORIZON LG 004200

## (undated) DEVICE — DISPOSABLE LAPAROSCOPIC CLIP APPLIER WITH 20 CLIPS.: Brand: EPIX® UNIVERSAL CLIP APPLIER

## (undated) DEVICE — VIOLET BRAIDED (POLYGLACTIN 910), SYNTHETIC ABSORBABLE SUTURE: Brand: COATED VICRYL

## (undated) DEVICE — CANNULATING SPHINCTEROTOME: Brand: AUTOTOME™ RX 44

## (undated) DEVICE — ABSORBABLE HEMOSTAT (OXIDIZED REGENERATED CELLULOSE, U.S.P.): Brand: SURGICEL

## (undated) DEVICE — HEMOCLIP HORIZON MED 002200

## (undated) DEVICE — 3M™ RED DOT™ MONITORING ELECTRODE WITH FOAM TAPE AND STICKY GEL, 50/BAG, 20/CASE, 72/PLT 2570: Brand: RED DOT™

## (undated) DEVICE — SUCTION COAGULATOR: Brand: VALLEYLAB

## (undated) DEVICE — BLADE 10MM SS HIP OSTM RADL

## (undated) DEVICE — DISPOSABLE, UNIPOLAR, BOVIE PLUG TO RIGHT ANGLE SINGLE PIN: Brand: QUANTUM

## (undated) DEVICE — MINI LAP PACK-LF: Brand: MEDLINE INDUSTRIES, INC.

## (undated) DEVICE — DISSECTOR SONICISION CORDLESS

## (undated) DEVICE — SUTURE PDS II 0 CTX

## (undated) DEVICE — LOCKING DEVICE RX & BIOPSY CAP

## (undated) DEVICE — JAGWIRE STRGHT TIP .025X450CM

## (undated) DEVICE — CATHETER,URETHRAL,REDRUBBER,STERILE,8FR: Brand: MEDLINE

## (undated) DEVICE — LAPAROTOMY SPONGE - RF AND X-RAY DETECTABLE PRE-WASHED: Brand: SITUATE

## (undated) DEVICE — TROCAR: Brand: KII® SLEEVE

## (undated) DEVICE — OASIS, ONE ACTION STENT INTRODUCTION SYSTEM: Brand: OASIS

## (undated) DEVICE — ENDOPATH ULTRA VERESS INSUFFLATION NEEDLES WITH LUER LOCK CONNECTORS: Brand: ENDOPATH

## (undated) DEVICE — SUTURE VICRYL 3-0 SH

## (undated) DEVICE — SUTURE PDS II 1 TP-1

## (undated) DEVICE — RETRIEVAL BALLOON CATHETER: Brand: EXTRACTOR™ PRO XL

## (undated) DEVICE — KENDALL SCD EXPRESS SLEEVES, KNEE LENGTH, MEDIUM: Brand: KENDALL SCD

## (undated) DEVICE — OMNIPAQUE 300 POLYB 50ML

## (undated) DEVICE — ACCESS AND DELIVERY CATHETER: Brand: SPYSCOPE DS

## (undated) DEVICE — TROCAR: Brand: KII SHIELDED BLADED ACCESS SYSTEM

## (undated) DEVICE — SOL  .9 1000ML BTL

## (undated) DEVICE — GUIDEWIRE DREAM STR .035 450

## (undated) DEVICE — POOLE SUCTION HANDLE: Brand: CARDINAL HEALTH

## (undated) DEVICE — DRAIN CHANNEL 19FR BLAKE

## (undated) DEVICE — UNDYED BRAIDED (POLYGLACTIN 910), SYNTHETIC ABSORBABLE SUTURE: Brand: COATED VICRYL

## (undated) DEVICE — DEVICE SPEC RTRVL RPTR 2.4MM

## (undated) DEVICE — SUTURE PDS II 1 ST-21

## (undated) DEVICE — SUTURE ETHILON 2-0 FS

## (undated) DEVICE — SNARE CAPTI HEX STIFF SMALL

## (undated) DEVICE — PROXIMATE SKIN STAPLERS (35 WIDE) CONTAINS 35 STAINLESS STEEL STAPLES (FIXED HEAD): Brand: PROXIMATE

## (undated) NOTE — ED AVS SNAPSHOT
[facility] Emergency Department    [place]  [address] [address]    Phone:  [phone]    Fax:  [place]   MRN: [id number]    Department:  [facility] Emergency Department   Date of Visit: Discharge Instructions       Continue 2 Aleve twice a day. Ice intermittently. Activity as tolerated. Return to the emergency department for new or worsening symptoms.     Discharge References/Attachments     CHEST WALL CONTUSION (ENGLISH)    CONTUSION, [facility] Emergency Department. Follow-up care is at the discretion of that Physician. IF THERE IS ANY CHANGE OR WORSENING OF YOUR CONDITION, CALL YOUR PRIMARY CARE PHYSICIAN AT ONCE OR RETURN IMMEDIATELY TO THE EMERGENCY DEPARTMENT.     If you hav harming yourself, contact [facility] at [phone]. - If you don’t have insurance, Brielle Pham has partnered with Patient [address] to help you get signed up for insurance coverage.   Patient Guin XR RIBS WITH CHEST, BILATERAL   (CPT=71111) (Final result) Result time:  02/12/17 10:03:29    Final result    Impression:    CONCLUSION:  Negative for acute displaced or nondisplaced rib fracture. Probable healing left 10th   rib fracture.            José Manuel Lyman FINDINGS:  Ventricles and sulci are diffusely prominent in caliber which may be upper normal allowing for age. No mass effect or midline shift. No findings of territorial infarction or acute intracranial hemorrhage. No abnormal extra-axial fluid.  White   m

## (undated) NOTE — ED AVS SNAPSHOT
Juan Pablo Galloway   MRN: [id number]    Department:  [facility] Emergency Department   Date of Visit:  8/3/2017           Disclosure     Insurance plans vary and the physician(s) referred by the ER may not be covered by your plan.  Please contact If you have been prescribed any medication(s), please fill your prescription right away and begin taking the medication(s) as directed    If the emergency physician has read X-rays, these will be re-interpreted by a radiologist.  If there is a significant

## (undated) NOTE — LETTER
Patient Name: Judy Alejandra        : 1930       Medical Record #: [id number]    CONSENT FOR PROCEDURES/SEDATION    Date: 2020       Time: 4:24 PM        1.  I authorize the performance upon Viviana Cortes the following:  Kyphoplasty

## (undated) NOTE — ED AVS SNAPSHOT
[facility] Emergency Department    [facility]  [address] [address]    Phone:  [phone]    Fax:  [place]   MRN: [id number]    Department:  [facility] Emergency Department   Date of Visit: IF THERE IS ANY CHANGE OR WORSENING OF YOUR CONDITION, CALL YOUR PRIMARY CARE PHYSICIAN AT ONCE OR RETURN IMMEDIATELY TO THE EMERGENCY DEPARTMENT.     If you have been prescribed any medication(s), please fill your prescription right away and begin taking t

## (undated) NOTE — LETTER
Nikki Bradshaw [address]  [address] [facility] [address], [address] [facility]  Authorization for Surgical Operation and Procedure     Date:___________                                                                                                         Time:__________ potential risks that can occur: fever and allergic reactions, hemolytic reactions, transmission of diseases such as Hepatitis, AIDS and Cytomegalovirus (CMV) and fluid overload.   In the event that I wish to have an autologous transfusion of my own blood, o attending physician will determine when the applicable recovery period ends for purposes of reinstating the DNAR order.   10. Patients having a sterilization procedure: I understand that if the procedure is successful the results will be permanent and it wi a. Allow the anesthesiologist (anesthesia doctor) to give me medicine and do additional procedures as necessary.  Some examples are: Starting or using an “IV” to give me medicine, fluids or blood during my procedure, and having a breathing tube placed to he 7. Regional Anesthesia (“spinal”, “epidural”, & “nerve blocks”): I understand that rare but potential complications include headache, bleeding, infection, seizure, irregular heart rhythms, and nerve injury.     I can change my mind about having anesthesia

## (undated) NOTE — LETTER
Nikki Bradshaw [address] [address]  [place], [address] [facility]    Consent for Operation  Date: __________________                                Time: _______________    1.  I authorize the performance upon Myrna Johnson the following operation:  Proce procedure has been videotaped, the surgeon will obtain the original videotape. The hospital will not be responsible for storage or maintenance of this tape.   7. For the purpose of advancing medical education, I consent to the admittance of observers to the STATEMENTS REQUIRING INSERTION OR COMPLETION WERE FILLED IN.     Signature of Patient:   ___________________________    When the patient is a minor or mentally incompetent to give consent:  Signature of person authorized to consent for patient: ____________ supplements, and pills I can buy without a prescription (including street drugs/illegal medications). Failure to inform my anesthesiologist about these medicines may increase my risk of anesthetic complications. iv.  If I am allergic to anything or have ha Anesthesiologist Signature     Date   Time  I have discussed the procedure and information above with the patient (or patient’s representative) and answered their questions. The patient or their representative has agreed to have anesthesia services.     ___

## (undated) NOTE — LETTER
[facility] [address], [address]  Consent for Procedure/Sedation    Date: 07/29/2020    Time: 0847      1. I authorize the performance upon Viviana Cortes the following:  T11 Kyphoplasty under Fluoroscopy     2.  I authorize Printed: 2020   8:47 AM  Patient Name: Sierra Willett        : 1930       Medical Record #: [id number]

## (undated) NOTE — IP AVS SNAPSHOT
[address]            (For Outpatient Use Only) Initial Admit Date: 7/26/2020   Inpt/Obs Admit Date: Inpt: N/A / Obs: 07/26/20   Discharge Date:    Andreina Callejas:  [de-identified]   MRN: [de-identified]   CSN: [id number]   CEID: [id number] [employer] INSURANCE   Payor:  Plan:    Group Number:  Insurance Type:    Subscriber Name:  Subscriber :    Subscriber ID:  Pt Rel to Subscriber:    Hospital Account Financial Class: Medicare    2020

## (undated) NOTE — IP AVS SNAPSHOT
Patient Demographics     Address  [address] [address] Phone  [phone] [facility]  [phone] [facility] E-mail Address  [email or web address]      Emergency Contact(s)     Name Relation Home Work [address] [facility] Daughter   [phone] Commonly known as: Toprol XL  Next dose due:  7/31 am      Take 25 mg by mouth daily.                 Where to Get Your Medications      Please  your prescriptions at the location directed by your doctor or nurse    Bring a paper prescription for ea Filed:  7/26/2020  8:25 PM Date of Service:  7/26/2020  6:04 PM Status:  Signed    :  Bev Moreau MD (Physician)       [facility] hosptalist H+P  PCP[ID.1] Arti Morales MD[ID.2]  CC  HPI80 yo female with multiple medical problems including but not • Pancreatitis    • Personal history of antineoplastic chemotherapy     last tx 1975   • Pneumonia due to organism    • Shortness of breath     2 L/NC at HS   • Unspecified essential hypertension    • Visual impairment      Past Surgical History:   Procedu Tobacco Use      Smoking status: Former Smoker        Packs/day: 0.06        Years: 60.00        Pack years: 3.6        Start date: 1952        Quit date: 2004        Years since quittin.5      Smokeless tobacco: Never Used    Substance and acetaminophen 500 MG Oral Tab, Take 1,000 mg by mouth every 6 (six) hours as needed for Pain., Disp: , Rfl:   AmLODIPine Besylate 5 MG Oral Tab, Take 5 mg by mouth daily. , Disp: , Rfl:   Metoprolol Succinate ER 25 MG Oral Tablet 24 Hr, Take 25 mg by mouth COPD no wheezing    Hx of A-fib, CHF  Denies chest pain, no SOB  On exam no fluid overload    Infrarenal AAA noted on CT scan in July 2020, pt and her daughter were informed about this result by me during prior admit  During my exam + radial and DP pulses. persistent back pain. Repeat imaging showed T 11 compression fracture.     # Acute T 11 Compression Fracture  - pain not controlled with PO meds and patient got confused  - s/p kyphoplasty 7/29  - did well with PT today -- walked in halls  - tylenol prn for Total Time Coordinating Care: Greater than 30 minutes    Patient had opportunity to ask questions and state understand and agree with therapeutic plan as outlined above.      Thank Ivonne Delatorre MD[SB.1]            Electronically signed by Ana Kelley *RAPID-SARS(-) 7/26/20    Xray of lx spine 7/26/20-CONCLUSION:  Marked levo convex thoracolumbar scoliosis. Age indeterminate compression fracture of T11 with greater than 50% loss of height.   This does not appear to been present on a prior CT scan from • CYST/MASS EXCISION N/A 12/9/2019    Performed by Lynette Otero MD at [facility] MAIN OR   • ENDOSCOPIC RETROGRADE CHOLANGIOPANCREATOGRAPHY (ERCP) N/A 11/26/2019    Performed by Ole Moore MD at [address] [place] [address] Management Techniques: Activity promotion; Body mechanics;Repositioning[EB. 2]    COGNITION[EB. 1]  · Overall Cognitive Status:  WFL - within functional limits  · Arousal/Alertness:  appropriate responses to stimuli  · Orientation Level:  oriented x4  · Jeanne Leon Standardized Score (AM-PAC Scale): 43.63   CMS Modifier (G-Code): CK[EB.2]    FUNCTIONAL ABILITY STATUS  Gait Assessment[EB.1]   Gait Assistance: Minimum assistance  Distance (ft): 20  Assistive Device: Rolling walker  Pattern: Shuffle;L Foot flat;R Foot f demonstrating a 46.58% degree of impairment in mobility. [EB.3] Based on this evaluation, patient's clinical presentation is[EB. 1] evolving[EB. 3] and overall the evaluation complexity is considered[EB. 1] moderate[EB.3].   These impairments and comorbidities Occupational Therapy Note signed by Nicki Garcia OT at 7/29/2020  3:11 PM  Version 1 of 1    Author:  Nicki Garcia OT Service:  Jean Redmond Author Type:  Occupational Therapist    Filed:  7/29/2020  3:11 PM Date of Service:  7/29/2020  2:55 PM Status:  Signed    Nader • Visual impairment[SH.2]        Past Surgical History[SH.1]  Past Surgical History:   Procedure Laterality Date   • APPENDECTOMY     • CATARACT     • CHOLECYSTECTOMY  08/2019    Dr. Mey Coker   • COLONOSCOPY  07/06/2004    Diverticulosis, no polyps   • C WEIGHT BEARING RESTRICTION[SH.1]  Weight Bearing Restriction: None[SH.2]                PAIN ASSESSMENT[SH.1]  Rating: Unable to rate  Location: back  Management Techniques: Activity promotion; Body mechanics;Breathing techniques;Relaxation;Repositioning[SH Educated pt on brace management, total (A) to don. Sit to stand CGA via RW. Commode transfer min (A). Assist for pants up/down. Completes meliza care stance level CGA. Chair transfer min (A). Grooming setup in chair.    Ambulates into baumann CGA via RW, oc performance deficits    Client Assessment/Performance Deficits LOW - No comorbidities nor modifications of tasks    Clinical Decision Making LOW - Analysis of occupational profile, problem-focused assessments, limited treatment options    Overall Complexit Attribution Key    AO. 1 - Nicolas Lowery OT on 7/28/2020  5:59 PM                     Video Swallow Study Notes    No notes of this type exist for this encounter. SLP Notes    No notes of this type exist for this encounter.      Immunizations     Name

## (undated) NOTE — ED AVS SNAPSHOT
Kasey Catalanlatiastefan   MRN: [id number]    Department:  [facility] Emergency Department   Date of Visit:  9/9/2019           Disclosure     Insurance plans vary and the physician(s) referred by the ER may not be covered by your plan.  Please contact tell this physician (or your personal doctor if your instructions are to return to your personal doctor) about any new or lasting problems. The primary care or specialist physician will see patients referred from the [facility] Emergency Department.  Charlie Plaza

## (undated) NOTE — IP AVS SNAPSHOT
Patient Demographics     Address Phone    [facility] [address]  [place] [phone] [facility]      Emergency Contact(s)     Name Relation Home Work Mobile    MESSI Daughter [phone]      Rohan Lim Daughter [phone] Take 1 capsule (40 mg total) by mouth daily. Eli Lord                           Pravastatin Sodium 10 MG Tabs   Last time this was given:  10 mg on 6/5/2017  8:15 PM   Commonly known as:  PRAVACHOL        Take 10 mg by mouth nightly. Pulse 77 Filed at 06/06/2017 1544    Resp 18 Filed at 06/06/2017 1544    Temp 98.2 °F (36.8 °C) Filed at 06/06/2017 1544    SpO2 90 % Filed at 06/06/2017 1549      Patient's Most Recent Weight       Most Recent Value    Patient Weight 68.04 kg (150 lb) Please view results for these tests on the individual orders.     Specimen Information    Type Source Collected On   Blood  06/06/17 0536            Testing Performed By     Lab - [address] [facility]. Name Director Address Valid Date Range    139 - Ed Family History   Problem Relation Age of Onset   • Other[other] [OTHER] Father      MS   • Heart Disorder Brother 78     Myocardial infarction       Review of Systems  Comprehensive ROS reviewed and negative except for what is stated in HPI.       OBJECTIVE dislocation. SI joints are symmetric. Postsurgical changes right hip.     Dictated by: Foreign Michael MD on 6/05/2017 at 12:52     Approved by: Foreign Michael MD               Assessment/Plan:     Comminuted L superior and inferior pubic rami fracture  - • Osteoporosis    • Other and unspecified hyperlipidemia    • Lumbosacral spondylosis without myelopathy    • A-fib (HCC)    • Pancreatitis    • Scoliosis          Past Surgical History    HYSTERECTOMY  1970    Comment Oophorectomy, for endometrial CA    C •  FLEET ENEMA (FLEET) 7-19 GM/118ML enema 133 mL, 1 enema, Rectal, Once PRN  •  TraMADol HCl (ULTRAM) tab 50 mg, 50 mg, Oral, Q6H PRN    Review of Systems:  Pertinent items are noted in HPI. Physical Exam:    General: Alert, orientated x3.   Cooperative SI joints are symmetric.      Postsurgical changes right hip.              Dictated by: Migue Sims MD on 6/05/2017 at 12:52        Approved by: Migue Sims MD                   Impression and Plan:  Patient Active Problem List:     Hypertension inferior pubic rami fractures. Pt with history of a-fib, htn. Hx of R hip fx with ORIF.     Problem List  Principal Problem:    Fracture of multiple pubic rami, left, closed, initial encounter [facility])  Active Problems:    Fracture of multiple pubic rami ([address] RANGE OF MOTION AND STRENGTH ASSESSMENT  Upper extremity ROM and strength are within functional limits     Lower extremity ROM is within functional limits     Lower extremity strength is within functional limits except for the following:  L hip grossly 3/5 completes gentle ROM in bed with AAROM for L LE. Pt completes supine to sit with mod assist of 2. Sits EOB with supervision. Pt completes sit to stand with mod assist of 2. Gait training with WBAT and min assist of 2 X 1-2 ft.  Pt with difficulty bearing we After this period of rehabilitation patient should achieve mod I level in gait, transfers and ability to negotiate 15 stairs to bedroom for safe return home where pt lives with dtr.  If pt does not qualify for [facility] recommend home with 24 hour assist, w/c, b with resultant L pubic rami fx. X-ray: Comminuted left superior and inferior pubic rami fractures. Pt with history of a-fib, htn. Hx of R hip fx with ORIF.     Problem List  Principal Problem:    Fracture of multiple pubic rami, left, closed, initial encoun · Overall Cognitive Status:  WFL - within functional limits    RANGE OF MOTION AND STRENGTH ASSESSMENT  Upper extremity ROM and strength are within functional limits     Lower extremity ROM is within functional limits     Lower extremity strength is within dtr. If pt does not qualify for [facility] recommend home with 24 hour assist, w/c, bedside commode and  PT. SW notified of [facility] recommendation.            DISCHARGE RECOMMENDATIONS  PT Discharge Recommendations: Sub-acute rehabilitation (with [facility] 18-21 days)

## (undated) NOTE — IP AVS SNAPSHOT
[facility] [place] [address] [place], [address] ~ [phone]                Discharge Summary   6/5/2017    Myriam Cortes           Admission Information        Provider Department    6/5/2017 Crystal Messina MD  3sw-A Last time this was given:  50 mg on 6/6/2017  8:17 AM   Commonly known as:  COZAAR        Take 50 mg by mouth 2 (two) times daily.                                Metoprolol Succinate ER 25 MG Tb24   Last time this was given:  25 mg on 6/6/2017  5:40 AM   Co Abs Final Neut Abs Lymphocyte Abso Monocyte Absolu Eosinophil Abso Basophil Absolu    (06/06/17)  62.1 (06/06/17)  23.6 (06/06/17)  9.4 (06/06/17)  3.0 (06/06/17)  1.4 -- (06/06/17)  6.36 (06/06/17)  2.42 (06/06/17)  0.96 (H) (06/06/17)  0.31 (H) (06/06/17 Medication Side Effects - Medications to be taken at home  As your caregivers, we want you to be aware of the medications you are prescribed to take and their potential SIDE EFFECTS.  Your nurse will review your medications with you before you are discharge movement in 2+ days, diarrhea           Calming and Sleep Medications     Ibuprofen-Diphenhydramine HCl (ADVIL PM) 200-25 MG Oral Cap       Use: Treat agitation and difficulty sleeping   Most common side effects: Drowsiness, slows breathing   What to repor